# Patient Record
Sex: MALE | Race: BLACK OR AFRICAN AMERICAN | Employment: OTHER | URBAN - METROPOLITAN AREA
[De-identification: names, ages, dates, MRNs, and addresses within clinical notes are randomized per-mention and may not be internally consistent; named-entity substitution may affect disease eponyms.]

---

## 2021-12-21 ENCOUNTER — HOSPITAL ENCOUNTER (EMERGENCY)
Age: 86
Discharge: NURSING, REHAB OR LONG-TERM CARE | End: 2021-12-22
Attending: EMERGENCY MEDICINE
Payer: MEDICARE

## 2021-12-21 ENCOUNTER — APPOINTMENT (OUTPATIENT)
Dept: CT IMAGING | Age: 86
End: 2021-12-21
Attending: EMERGENCY MEDICINE
Payer: MEDICARE

## 2021-12-21 VITALS
HEIGHT: 69 IN | TEMPERATURE: 97.8 F | SYSTOLIC BLOOD PRESSURE: 107 MMHG | RESPIRATION RATE: 12 BRPM | BODY MASS INDEX: 29.62 KG/M2 | OXYGEN SATURATION: 96 % | DIASTOLIC BLOOD PRESSURE: 72 MMHG | WEIGHT: 200 LBS | HEART RATE: 67 BPM

## 2021-12-21 DIAGNOSIS — R53.1 WEAKNESS: Primary | ICD-10-CM

## 2021-12-21 LAB
ALBUMIN SERPL-MCNC: 3.1 G/DL (ref 3.5–5)
ALBUMIN/GLOB SERPL: 0.6 {RATIO} (ref 1.1–2.2)
ALP SERPL-CCNC: 62 U/L (ref 45–117)
ALT SERPL-CCNC: 26 U/L (ref 12–78)
ANION GAP SERPL CALC-SCNC: 5 MMOL/L (ref 5–15)
AST SERPL-CCNC: 91 U/L (ref 15–37)
BASOPHILS # BLD: 0.1 K/UL (ref 0–0.1)
BASOPHILS NFR BLD: 1 % (ref 0–1)
BILIRUB SERPL-MCNC: 0.4 MG/DL (ref 0.2–1)
BUN SERPL-MCNC: 53 MG/DL (ref 6–20)
BUN/CREAT SERPL: 32 (ref 12–20)
CALCIUM SERPL-MCNC: 9.8 MG/DL (ref 8.5–10.1)
CHLORIDE SERPL-SCNC: 100 MMOL/L (ref 97–108)
CO2 SERPL-SCNC: 30 MMOL/L (ref 21–32)
COMMENT, HOLDF: NORMAL
COVID-19 RAPID TEST, COVR: NOT DETECTED
CREAT SERPL-MCNC: 1.68 MG/DL (ref 0.7–1.3)
DIFFERENTIAL METHOD BLD: ABNORMAL
EOSINOPHIL # BLD: 0.3 K/UL (ref 0–0.4)
EOSINOPHIL NFR BLD: 4 % (ref 0–7)
ERYTHROCYTE [DISTWIDTH] IN BLOOD BY AUTOMATED COUNT: 15.4 % (ref 11.5–14.5)
GLOBULIN SER CALC-MCNC: 4.8 G/DL (ref 2–4)
GLUCOSE SERPL-MCNC: 150 MG/DL (ref 65–100)
HCT VFR BLD AUTO: 34 % (ref 36.6–50.3)
HGB BLD-MCNC: 10.4 G/DL (ref 12.1–17)
IMM GRANULOCYTES # BLD AUTO: 0 K/UL (ref 0–0.04)
IMM GRANULOCYTES NFR BLD AUTO: 1 % (ref 0–0.5)
LYMPHOCYTES # BLD: 0.9 K/UL (ref 0.8–3.5)
LYMPHOCYTES NFR BLD: 13 % (ref 12–49)
MCH RBC QN AUTO: 28.4 PG (ref 26–34)
MCHC RBC AUTO-ENTMCNC: 30.6 G/DL (ref 30–36.5)
MCV RBC AUTO: 92.9 FL (ref 80–99)
MONOCYTES # BLD: 0.8 K/UL (ref 0–1)
MONOCYTES NFR BLD: 11 % (ref 5–13)
NEUTS SEG # BLD: 4.8 K/UL (ref 1.8–8)
NEUTS SEG NFR BLD: 70 % (ref 32–75)
NRBC # BLD: 0 K/UL (ref 0–0.01)
NRBC BLD-RTO: 0 PER 100 WBC
PLATELET # BLD AUTO: 240 K/UL (ref 150–400)
PMV BLD AUTO: 9.3 FL (ref 8.9–12.9)
POTASSIUM SERPL-SCNC: 3.8 MMOL/L (ref 3.5–5.1)
PROT SERPL-MCNC: 7.9 G/DL (ref 6.4–8.2)
RBC # BLD AUTO: 3.66 M/UL (ref 4.1–5.7)
SAMPLES BEING HELD,HOLD: NORMAL
SODIUM SERPL-SCNC: 135 MMOL/L (ref 136–145)
SOURCE, COVRS: NORMAL
WBC # BLD AUTO: 6.8 K/UL (ref 4.1–11.1)

## 2021-12-21 PROCEDURE — 36415 COLL VENOUS BLD VENIPUNCTURE: CPT

## 2021-12-21 PROCEDURE — 74011250636 HC RX REV CODE- 250/636: Performed by: EMERGENCY MEDICINE

## 2021-12-21 PROCEDURE — 93005 ELECTROCARDIOGRAM TRACING: CPT

## 2021-12-21 PROCEDURE — 80053 COMPREHEN METABOLIC PANEL: CPT

## 2021-12-21 PROCEDURE — 70450 CT HEAD/BRAIN W/O DYE: CPT

## 2021-12-21 PROCEDURE — 85025 COMPLETE CBC W/AUTO DIFF WBC: CPT

## 2021-12-21 PROCEDURE — 87635 SARS-COV-2 COVID-19 AMP PRB: CPT

## 2021-12-21 PROCEDURE — 99285 EMERGENCY DEPT VISIT HI MDM: CPT

## 2021-12-21 RX ADMIN — SODIUM CHLORIDE 500 ML: 9 INJECTION, SOLUTION INTRAVENOUS at 16:09

## 2021-12-21 NOTE — PROGRESS NOTES
4:31 PM    Date of previous inpatient admission/ ED visit? No previous inpatient admissions/ED visits from [facility]. What brought the patient to ED? Chart reviewed: Patient brought in by EMS from [facility] for CVA rule out. Patient  w/ right sided weakness for 3 days. Did patient decline recommended services during last admission/ ED visit (if yes, what)? No previous inpatient admissions/ED visits from [facility]    Has patient seen a provider since their last inpatient admission/ED visit (if yes, when)? No previous inpatient admissions/ED visits from [facility]. Last seen by physician at The [address] [place] [address]. Date of service is unknown. PCP: YES First and Last name: Dr. Prince Moody    Name of Practice: The [address] [place] [address]   Are you a current patient: Yes/No:  YES   Approximate date of last visit: Unknown    CM Interventions:  From previous inpatient admission/ED visit: No previous inpatient admissions/ED visits. From current inpatient admission/ED visit:  CM consult received for weakness and debility. Patient currently being ruled out for COVID-19. Initial assessment completed with patient's spouse, Horace Queenr [phone] and SW at [facility]. Other identified supports are patient's sons, Claude Sato and Steven Martinez. Patient is a 80year old LTC resident at [facility]. Requires assistance with ADL's and IADL's. DME utilized: walker, cane, motorized scooter. Insurance verified: Medicare A&B. Patient has applied for LTC Medicaid at facility. Medications are administered. BLS transport needed at discharge. SARAI plan is for patient to transition back to LTC residence once medically stable. CM will continue to follow and assist with SARAI needs as they arise.     AMY Pritchard/MEAGHAN  Care Management

## 2021-12-21 NOTE — ED NOTES
Pt brought in by EMS from [facility] for CVA rule out. Pt w/ right sided weakness for 3 days.

## 2021-12-21 NOTE — ED PROVIDER NOTES
Date of Service:  12/21/2021    Patient:  Rivas Jones    Chief Complaint:  Extremity Weakness (right sided weakness 3x days )       HPI:  Rivas Jones is a 80 y.o.  male who presents for evaluation of extremity weakness. Patient reportedly has 3 days worth of right-sided weakness. Per patient he states that his right leg feels weak. He is unsure of the time. He comes from some type of facility that apparently notices weakness 3 days ago. Patient denies chest pain shortness of breath abdominal pain nausea vomiting diarrhea headaches fevers or chills. States that he has not really eating very much. Otherwise he has no pain. No past medical history on file. No past surgical history on file. No family history on file. Social History     Socioeconomic History    Marital status: Not on file     Spouse name: Not on file    Number of children: Not on file    Years of education: Not on file    Highest education level: Not on file   Occupational History    Not on file   Tobacco Use    Smoking status: Not on file    Smokeless tobacco: Not on file   Substance and Sexual Activity    Alcohol use: Not on file    Drug use: Not on file    Sexual activity: Not on file   Other Topics Concern    Not on file   Social History Narrative    Not on file     Social Determinants of Health     Financial Resource Strain:     Difficulty of Paying Living Expenses: Not on file   Food Insecurity:     Worried About Running Out of Food in the Last Year: Not on file    Mery of Food in the Last Year: Not on file   Transportation Needs:     Lack of Transportation (Medical): Not on file    Lack of Transportation (Non-Medical):  Not on file   Physical Activity:     Days of Exercise per Week: Not on file    Minutes of Exercise per Session: Not on file   Stress:     Feeling of Stress : Not on file   Social Connections:     Frequency of Communication with Friends and Family: Not on file    Frequency of Social Gatherings with Friends and Family: Not on file    Attends Taoism Services: Not on file    Active Member of Clubs or Organizations: Not on file    Attends Club or Organization Meetings: Not on file    Marital Status: Not on file   Intimate Partner Violence:     Fear of Current or Ex-Partner: Not on file    Emotionally Abused: Not on file    Physically Abused: Not on file    Sexually Abused: Not on file   Housing Stability:     Unable to Pay for Housing in the Last Year: Not on file    Number of [place] in the Last Year: Not on file    Unstable Housing in the Last Year: Not on file         ALLERGIES: Patient has no allergy information on record. Review of Systems   Neurological: Positive for weakness. All other systems reviewed and are negative. Vitals:    12/21/21 1200   BP: 132/63   Pulse: 78   Resp: 18   Temp: 97.8 °F (36.6 °C)   SpO2: 96%            Physical Exam  Vitals and nursing note reviewed. Constitutional:       Appearance: Normal appearance. HENT:      Head: Normocephalic and atraumatic. Mouth/Throat:      Mouth: Mucous membranes are moist.   Eyes:      General: No scleral icterus. Extraocular Movements: Extraocular movements intact. Pupils: Pupils are equal, round, and reactive to light. Cardiovascular:      Rate and Rhythm: Normal rate. Pulmonary:      Effort: Pulmonary effort is normal.   Abdominal:      General: Abdomen is flat. Musculoskeletal:         General: No tenderness or deformity. Skin:     General: Skin is warm. Capillary Refill: Capillary refill takes less than 2 seconds. Neurological:      Mental Status: He is alert and oriented to person, place, and time. Comments: RLE 2/5, LLE 4/5  B/L UE 4/5   Psychiatric:         Mood and Affect: Mood normal.          [facility]  ED Course as of 12/21/21 1959   Tue Dec 21, 2021   1349 EKG 1341  Sinus at 79 with a left axis. Incomplete left bundle branch block pattern.   No STEMI [GG] ED Course User Index  [GG] Mell Rcok DO     VITAL SIGNS:  Patient Vitals for the past 4 hrs:   Pulse Resp BP SpO2   12/21/21 1730 67 12 107/72    12/21/21 1700 63 14 118/70 96 %   12/21/21 1630 62 12 116/72 99 %         LABS:  Recent Results (from the past 6 hour(s))   CBC WITH AUTOMATED DIFF    Collection Time: 12/21/21  3:16 PM   Result Value Ref Range    WBC 6.8 4.1 - 11.1 K/uL    RBC 3.66 (L) 4.10 - 5.70 M/uL    HGB 10.4 (L) 12.1 - 17.0 g/dL    HCT 34.0 (L) 36.6 - 50.3 %    MCV 92.9 80.0 - 99.0 FL    MCH 28.4 26.0 - 34.0 PG    MCHC 30.6 30.0 - 36.5 g/dL    RDW 15.4 (H) 11.5 - 14.5 %    PLATELET 901 886 - 351 K/uL    MPV 9.3 8.9 - 12.9 FL    NRBC 0.0 0  WBC    ABSOLUTE NRBC 0.00 0.00 - 0.01 K/uL    NEUTROPHILS 70 32 - 75 %    LYMPHOCYTES 13 12 - 49 %    MONOCYTES 11 5 - 13 %    EOSINOPHILS 4 0 - 7 %    BASOPHILS 1 0 - 1 %    IMMATURE GRANULOCYTES 1 (H) 0.0 - 0.5 %    ABS. NEUTROPHILS 4.8 1.8 - 8.0 K/UL    ABS. LYMPHOCYTES 0.9 0.8 - 3.5 K/UL    ABS. MONOCYTES 0.8 0.0 - 1.0 K/UL    ABS. EOSINOPHILS 0.3 0.0 - 0.4 K/UL    ABS. BASOPHILS 0.1 0.0 - 0.1 K/UL    ABS. IMM. GRANS. 0.0 0.00 - 0.04 K/UL    DF AUTOMATED     METABOLIC PANEL, COMPREHENSIVE    Collection Time: 12/21/21  3:16 PM   Result Value Ref Range    Sodium 135 (L) 136 - 145 mmol/L    Potassium 3.8 3.5 - 5.1 mmol/L    Chloride 100 97 - 108 mmol/L    CO2 30 21 - 32 mmol/L    Anion gap 5 5 - 15 mmol/L    Glucose 150 (H) 65 - 100 mg/dL    BUN 53 (H) 6 - 20 MG/DL    Creatinine 1.68 (H) 0.70 - 1.30 MG/DL    BUN/Creatinine ratio 32 (H) 12 - 20      GFR est AA 47 (L) >60 ml/min/1.73m2    GFR est non-AA 39 (L) >60 ml/min/1.73m2    Calcium 9.8 8.5 - 10.1 MG/DL    Bilirubin, total 0.4 0.2 - 1.0 MG/DL    ALT (SGPT) 26 12 - 78 U/L    AST (SGOT) 91 (H) 15 - 37 U/L    Alk.  phosphatase 62 45 - 117 U/L    Protein, total 7.9 6.4 - 8.2 g/dL    Albumin 3.1 (L) 3.5 - 5.0 g/dL    Globulin 4.8 (H) 2.0 - 4.0 g/dL    A-G Ratio 0.6 (L) 1.1 - 2.2     SAMPLES BEING HELD    Collection Time: 12/21/21  3:16 PM   Result Value Ref Range    SAMPLES BEING HELD 2RED, 1BLU     COMMENT        Add-on orders for these samples will be processed based on acceptable specimen integrity and analyte stability, which may vary by analyte. COVID-19 RAPID TEST    Collection Time: 12/21/21  6:22 PM   Result Value Ref Range    Specimen source Nasopharyngeal      COVID-19 rapid test Not detected NOTD          IMAGING:  CT HEAD WO CONT   Final Result   1. No evidence of acute infarct or intracranial hemorrhage. 2. Periventricular white matter disease is likely secondary to chronic small   vessel ischemic changes. Medications During Visit:  Medications   sodium chloride 0.9 % bolus infusion 500 mL (500 mL IntraVENous New Bag 12/21/21 7402)         DECISION MAKING:  Charito Stein is a 80 y.o. male who comes in as above. Here, patient appears well. He is able to get up and ambulate at what we believe is his baseline based on our conversation with the SNF. Patient has no signs of focal deficit otherwise. Patient to be discharged home. Patient has no other signs of acute distress. IMPRESSION:  1. Weakness        DISPOSITION:  Discharged      There are no discharge medications for this patient. Follow-up Information     Follow up With Specialties Details Why Contact Info    Harwood Prader, MD Internal Medicine Schedule an appointment as soon as possible for a visit   [address] [place] [address] [phone]              The patient is asked to follow-up with their primary care provider in the next several days. They are to call tomorrow for an appointment. The patient is asked to return promptly for any increased concerns or worsening of symptoms. They can return to this emergency department or any other emergency department.     Procedures

## 2021-12-22 NOTE — PROGRESS NOTES
7:02 PM  CM consulted to assist with patient transport back to [facility] [employer]. Patient in need of stretcher transport. CM attempted to establish transport with [facility] or [facility]. Both of those agencies unable to accommodate patient for transport until tomorrow. Referral placed to [facility] [phone]. [facility] indicated that that they are able to accept and accommodate patient this evening. ETA: 10:30 pm-10:45 pm.  PCS form completed and provided to ED staff for patient transport.     AMY Pritchard/CRM  Care Management

## 2021-12-22 NOTE — PROGRESS NOTES
Pt is cleared to DC from ER, currently asleep, awaiting transportation since last night. Pt does not have decline in function compared to prior to admission per MD and report from facility. Completing orders at this time, DC from caseload.      Yogesh Kamara DPT, PT

## 2021-12-22 NOTE — PROGRESS NOTES
EMR noted SSED consult 12/21. Noted delay in transportation and patient picked up today after 1100. Updates received from [facility].

## 2021-12-24 LAB
ATRIAL RATE: 67 BPM
CALCULATED P AXIS, ECG09: 86 DEGREES
CALCULATED R AXIS, ECG10: -44 DEGREES
CALCULATED T AXIS, ECG11: 124 DEGREES
DIAGNOSIS, 93000: NORMAL
P-R INTERVAL, ECG05: 170 MS
Q-T INTERVAL, ECG07: 402 MS
QRS DURATION, ECG06: 116 MS
QTC CALCULATION (BEZET), ECG08: 424 MS
VENTRICULAR RATE, ECG03: 67 BPM

## 2022-01-04 ENCOUNTER — APPOINTMENT (OUTPATIENT)
Dept: CT IMAGING | Age: 87
DRG: 069 | End: 2022-01-04
Attending: EMERGENCY MEDICINE
Payer: MEDICARE

## 2022-01-04 ENCOUNTER — HOSPITAL ENCOUNTER (INPATIENT)
Age: 87
LOS: 1 days | Discharge: NURSING, REHAB OR LONG-TERM CARE | DRG: 069 | End: 2022-01-06
Attending: EMERGENCY MEDICINE | Admitting: FAMILY MEDICINE
Payer: MEDICARE

## 2022-01-04 DIAGNOSIS — H53.8 VISUAL BLURRINESS: ICD-10-CM

## 2022-01-04 DIAGNOSIS — R41.0 CONFUSION: Primary | ICD-10-CM

## 2022-01-04 LAB
ALBUMIN SERPL-MCNC: 3.2 G/DL (ref 3.5–5)
ALBUMIN/GLOB SERPL: 0.6 {RATIO} (ref 1.1–2.2)
ALP SERPL-CCNC: 69 U/L (ref 45–117)
ALT SERPL-CCNC: 27 U/L (ref 12–78)
ANION GAP SERPL CALC-SCNC: 9 MMOL/L (ref 5–15)
APTT PPP: 24.2 SEC (ref 22.1–31)
AST SERPL-CCNC: 30 U/L (ref 15–37)
BASOPHILS # BLD: 0 K/UL (ref 0–0.1)
BASOPHILS NFR BLD: 0 % (ref 0–1)
BILIRUB SERPL-MCNC: 0.4 MG/DL (ref 0.2–1)
BUN SERPL-MCNC: 40 MG/DL (ref 6–20)
BUN/CREAT SERPL: 18 (ref 12–20)
CALCIUM SERPL-MCNC: 9.6 MG/DL (ref 8.5–10.1)
CHLORIDE SERPL-SCNC: 101 MMOL/L (ref 97–108)
CO2 SERPL-SCNC: 24 MMOL/L (ref 21–32)
COMMENT, HOLDF: NORMAL
CREAT SERPL-MCNC: 2.22 MG/DL (ref 0.7–1.3)
DIFFERENTIAL METHOD BLD: ABNORMAL
EOSINOPHIL # BLD: 0 K/UL (ref 0–0.4)
EOSINOPHIL NFR BLD: 1 % (ref 0–7)
ERYTHROCYTE [DISTWIDTH] IN BLOOD BY AUTOMATED COUNT: 15.8 % (ref 11.5–14.5)
GLOBULIN SER CALC-MCNC: 5.2 G/DL (ref 2–4)
GLUCOSE BLD STRIP.AUTO-MCNC: 152 MG/DL (ref 65–117)
GLUCOSE SERPL-MCNC: 140 MG/DL (ref 65–100)
HCT VFR BLD AUTO: 36.5 % (ref 36.6–50.3)
HGB BLD-MCNC: 11.1 G/DL (ref 12.1–17)
IMM GRANULOCYTES # BLD AUTO: 0.1 K/UL (ref 0–0.04)
IMM GRANULOCYTES NFR BLD AUTO: 1 % (ref 0–0.5)
INR PPP: 1 (ref 0.9–1.1)
LYMPHOCYTES # BLD: 0.8 K/UL (ref 0.8–3.5)
LYMPHOCYTES NFR BLD: 14 % (ref 12–49)
MCH RBC QN AUTO: 28.5 PG (ref 26–34)
MCHC RBC AUTO-ENTMCNC: 30.4 G/DL (ref 30–36.5)
MCV RBC AUTO: 93.6 FL (ref 80–99)
MONOCYTES # BLD: 0.5 K/UL (ref 0–1)
MONOCYTES NFR BLD: 9 % (ref 5–13)
NEUTS SEG # BLD: 4.7 K/UL (ref 1.8–8)
NEUTS SEG NFR BLD: 75 % (ref 32–75)
NRBC # BLD: 0 K/UL (ref 0–0.01)
NRBC BLD-RTO: 0 PER 100 WBC
PLATELET # BLD AUTO: 232 K/UL (ref 150–400)
PMV BLD AUTO: 9.7 FL (ref 8.9–12.9)
POTASSIUM SERPL-SCNC: 4.2 MMOL/L (ref 3.5–5.1)
PROT SERPL-MCNC: 8.4 G/DL (ref 6.4–8.2)
PROTHROMBIN TIME: 10.5 SEC (ref 9–11.1)
RBC # BLD AUTO: 3.9 M/UL (ref 4.1–5.7)
SAMPLES BEING HELD,HOLD: NORMAL
SERVICE CMNT-IMP: ABNORMAL
SODIUM SERPL-SCNC: 134 MMOL/L (ref 136–145)
THERAPEUTIC RANGE,PTTT: NORMAL SECS (ref 58–77)
WBC # BLD AUTO: 6.2 K/UL (ref 4.1–11.1)

## 2022-01-04 PROCEDURE — 85730 THROMBOPLASTIN TIME PARTIAL: CPT

## 2022-01-04 PROCEDURE — 70450 CT HEAD/BRAIN W/O DYE: CPT

## 2022-01-04 PROCEDURE — 99285 EMERGENCY DEPT VISIT HI MDM: CPT

## 2022-01-04 PROCEDURE — 94762 N-INVAS EAR/PLS OXIMTRY CONT: CPT

## 2022-01-04 PROCEDURE — 0042T CT CODE NEURO PERF W CBF: CPT

## 2022-01-04 PROCEDURE — 74011000636 HC RX REV CODE- 636: Performed by: RADIOLOGY

## 2022-01-04 PROCEDURE — 85610 PROTHROMBIN TIME: CPT

## 2022-01-04 PROCEDURE — 70496 CT ANGIOGRAPHY HEAD: CPT

## 2022-01-04 PROCEDURE — G0378 HOSPITAL OBSERVATION PER HR: HCPCS

## 2022-01-04 PROCEDURE — 93005 ELECTROCARDIOGRAM TRACING: CPT

## 2022-01-04 PROCEDURE — 80053 COMPREHEN METABOLIC PANEL: CPT

## 2022-01-04 PROCEDURE — 85025 COMPLETE CBC W/AUTO DIFF WBC: CPT

## 2022-01-04 PROCEDURE — 36415 COLL VENOUS BLD VENIPUNCTURE: CPT

## 2022-01-04 PROCEDURE — 82962 GLUCOSE BLOOD TEST: CPT

## 2022-01-04 RX ORDER — GUAIFENESIN 100 MG/5ML
81 LIQUID (ML) ORAL DAILY
Status: DISCONTINUED | OUTPATIENT
Start: 2022-01-05 | End: 2022-01-06 | Stop reason: HOSPADM

## 2022-01-04 RX ORDER — ACETAMINOPHEN 325 MG/1
650 TABLET ORAL
Status: DISCONTINUED | OUTPATIENT
Start: 2022-01-04 | End: 2022-01-06 | Stop reason: HOSPADM

## 2022-01-04 RX ORDER — ACETAMINOPHEN 650 MG/1
650 SUPPOSITORY RECTAL
Status: DISCONTINUED | OUTPATIENT
Start: 2022-01-04 | End: 2022-01-06 | Stop reason: HOSPADM

## 2022-01-04 NOTE — ED NOTES
Assumed care of patient in [place] from [facility]. Unable to obtain IV access or labs after multiple sticks. Escalated for US IV. MD Dalila Baez currently evaluating patient.

## 2022-01-04 NOTE — ED NOTES
Bedside and Verbal shift change report given to Rodríguez Cavanaugh (oncoming nurse) by Mariposa Noguera (offgoing nurse). Report included the following information SBAR, Kardex, ED Summary, Intake/Output, MAR and Recent Results.

## 2022-01-04 NOTE — ED TRIAGE NOTES
Triage Note: Patient arrives from PT for blurry vision to the right eye and AMS since 2:45 PM today. Hx of dementia.  per EMS.

## 2022-01-04 NOTE — ED PROVIDER NOTES
This is a 25-year-old male who was just in the hospital on 21 December for right-sided weakness. He had a CT and labs done there were no acute findings so he was discharged home. Today he was in rehab. He has some dementia. He Started complaining of some blurry vision in the right eye and also had some confusion according to staff. EMS arrived and he was still having the visual symptoms. He did not know who the president was and did have some confusion but it was unclear whether this was his baseline or not. There is no history of fever or chills and no history of vomiting. Patient denies any pain. He states that he still having some symptoms of blurry vision and feels like he is a little weak in his right arm. His last known well was approximately 230-felicitas. No past medical history on file. No past surgical history on file. No family history on file. Social History     Socioeconomic History    Marital status:      Spouse name: Not on file    Number of children: Not on file    Years of education: Not on file    Highest education level: Not on file   Occupational History    Not on file   Tobacco Use    Smoking status: Not on file    Smokeless tobacco: Not on file   Substance and Sexual Activity    Alcohol use: Not on file    Drug use: Not on file    Sexual activity: Not on file   Other Topics Concern    Not on file   Social History Narrative    Not on file     Social Determinants of Health     Financial Resource Strain:     Difficulty of Paying Living Expenses: Not on file   Food Insecurity:     Worried About Running Out of Food in the Last Year: Not on file    Mery of Food in the Last Year: Not on file   Transportation Needs:     Lack of Transportation (Medical): Not on file    Lack of Transportation (Non-Medical):  Not on file   Physical Activity:     Days of Exercise per Week: Not on file    Minutes of Exercise per Session: Not on file   Stress:     Feeling of Stress : Not on file   Social Connections:     Frequency of Communication with Friends and Family: Not on file    Frequency of Social Gatherings with Friends and Family: Not on file    Attends Yazidi Services: Not on file    Active Member of Clubs or Organizations: Not on file    Attends Club or Organization Meetings: Not on file    Marital Status: Not on file   Intimate Partner Violence:     Fear of Current or Ex-Partner: Not on file    Emotionally Abused: Not on file    Physically Abused: Not on file    Sexually Abused: Not on file   Housing Stability:     Unable to Pay for Housing in the Last Year: Not on file    Number of [place] in the Last Year: Not on file    Unstable Housing in the Last Year: Not on file         ALLERGIES: Patient has no known allergies. Review of Systems   Constitutional: Positive for fever. Negative for activity change, appetite change, chills and fatigue. HENT: Negative for ear pain, facial swelling, sore throat and trouble swallowing. Eyes: Positive for visual disturbance ( Patient's complaining of blurred vision in the right eye). Negative for pain and discharge. Respiratory: Negative for chest tightness, shortness of breath and wheezing. Cardiovascular: Negative for chest pain and palpitations. Gastrointestinal: Negative for abdominal pain, blood in stool, nausea and vomiting. Genitourinary: Negative for difficulty urinating, flank pain and hematuria. Musculoskeletal: Negative for arthralgias, joint swelling, myalgias and neck pain. Skin: Negative for color change and rash. Neurological: Negative for dizziness, weakness, numbness and headaches. Hematological: Negative for adenopathy. Does not bruise/bleed easily. Psychiatric/Behavioral: Negative for behavioral problems, confusion and sleep disturbance. All other systems reviewed and are negative. There were no vitals filed for this visit.          Physical Exam  Vitals and nursing note reviewed. Constitutional:       General: He is not in acute distress. Appearance: He is well-developed. He is not ill-appearing or diaphoretic. Comments: This is a 59-year-old male who is alert. He is a bit confused in terms of the date the president where he is. He does not remember exactly what happened to him today. His only complaint is blurred vision. Vital signs are as noted. HENT:      Head: Normocephalic and atraumatic. Nose: Nose normal.   Eyes:      General: Visual field deficit ( Patient is just complaining of blurred vision without any visual field cut.) present. No scleral icterus. Conjunctiva/sclera: Conjunctivae normal.      Pupils: Pupils are equal, round, and reactive to light. Neck:      Thyroid: No thyromegaly. Vascular: No JVD. Trachea: No tracheal deviation. Comments: No carotid bruits noted. Cardiovascular:      Rate and Rhythm: Normal rate and regular rhythm. Heart sounds: Normal heart sounds. No murmur heard. No friction rub. No gallop. Pulmonary:      Effort: Pulmonary effort is normal. No respiratory distress. Breath sounds: Normal breath sounds. No wheezing or rales. Chest:      Chest wall: No tenderness. Abdominal:      General: Bowel sounds are normal. There is no distension. Palpations: Abdomen is soft. There is no mass. Tenderness: There is no abdominal tenderness. There is no guarding or rebound. Musculoskeletal:         General: No tenderness. Normal range of motion. Cervical back: Normal range of motion and neck supple. Lymphadenopathy:      Cervical: No cervical adenopathy. Skin:     General: Skin is warm and dry. Findings: No erythema or rash. Neurological:      Mental Status: He is alert and oriented to person, place, and time. Mental status is at baseline.       Cranial Nerves: No cranial nerve deficit, dysarthria or facial asymmetry ( There is slight squinting of the right eye and slight tearing noted in the eye.). Sensory: No sensory deficit. Motor: No weakness. Coordination: Coordination normal.      Deep Tendon Reflexes: Reflexes are normal and symmetric. Psychiatric:         Behavior: Behavior normal.         Thought Content: Thought content normal.         Judgment: Judgment normal.          MDM  Number of Diagnoses or Management Options     Amount and/or Complexity of Data Reviewed  Clinical lab tests: ordered and reviewed  Tests in the radiology section of CPT®: ordered and reviewed  Decide to obtain previous medical records or to obtain history from someone other than the patient: yes  Review and summarize past medical records: yes  Discuss the patient with other providers: yes    Risk of Complications, Morbidity, and/or Mortality  Presenting problems: high  Diagnostic procedures: high  Management options: high    Patient Progress  Patient progress: stable         Procedures      On the initial exam, the patient seemed to have some inability to hold his right arm even with his left. He appeared to have some type of discomfort or restriction in the shoulder and would not raise his arm even with the left. On further exam after his CT with passive range of motion he is able to get the shoulder fully extended. He was able to hold both arms up appropriately following the initial scan. He still complaining of slight blurry vision. Again it is noted that he has a little squinting in the right eye and he states that the eye was beginning to get a little watery. A code stroke was called on this patient because he was seen several weeks ago for right-sided weakness that resolved. He then today around 230 which was just prior to coming in developed some blurred vision according to physical therapy and confusion. The patient is demented and it was unclear how much of his confusion was truly new and how much of it was just baseline.   I have spoken with Dr. Ricardo Silva is on for telemetry neuro. He would like all 3 scans done. Will evaluate after the scans. 4:51 PM I have spoken with Dr. Mor Esparza again. His recommendation is to have the patient admitted for MRI and further evaluation. Is not clear whether or not he had a stroke a couple weeks ago when he had his right-sided weakness nor is it clear whether he has had some type of strokelike symptoms syndrome Today. The last known well is not clearly known either. He would like to maintain permissive hypertension and aspirin. We will have the hospitalist evaluate and admit for further evaluation and treatment. Perfect Serve Consult for Admission  4:56 PM    ED Room Number: O/HO  Patient Name and age:  Keron Dick 80 y.o.  male  Working Diagnosis:   1. Confusion    2.  Visual blurriness        COVID-19 Suspicion:  no  Sepsis present:  no  Reassessment needed: no  Code Status:  Full Code  Readmission: no  Isolation Requirements:  no  Recommended Level of Care:  telemetry  Department:[facility] Adult ED - 21   Other:

## 2022-01-04 NOTE — SENIOR SERVICES NOTE
SSED assistance. Collaboration with Dr. Yuriy Buckley, lack of documentation noted from receiving facility, assisting in matter. Chart Reviewed: Patient currently at [facility], [phone] for rehab services following 12/21/21 ED visit. Facility to fax documentation to ED  [phone]. Dr Yuriy Buckley aware.     Karlee Toscano NP  SSED  4:08 PM

## 2022-01-04 NOTE — PROGRESS NOTES
Pager response to Code Stroke in ER HO. Unable to assess due to medical intervention. No family present. Provided ministry of presence and collaborated with staff. Advised of  availability. Please contact [facility] for any further referrals. Visited by: Staci Desir. Patricia Osorio, [address] [place] [address] Road paging Service [phone]  .

## 2022-01-04 NOTE — ADVANCED PRACTICE NURSE
Neurocritical Care Code Stroke Documentation      Symptoms:   vague c/o blurry vision right. When I saw him at 65, he was c/o stomache ache as he is hungry   Last Known Well:  21 1430   Medical hx: Past Medical History:   Diagnosis Date    Dementia ([facility] [address]       Anticoagulation:  none on record   VAN:  /Negative   NIHSS:   1a-LOC: 0    1b-Month/Age:0    1c-Open/Close Hand: 0    2-Best Gaze: 0    3-Visual Fields: 0    4-Facial Palsy: 0    5a-Left Arm: 0    5b-Right Arm: 0    6a-Left Le    6b-Right Le    7-Limb Ataxia: 0    8-Sensory: 0    9-Best Language: 0    10-Dysarthria: 0    11-Extinction/Inattention: 0  TOTAL SCORE: 0   Imaging:   CT: No acute findings    CTA: No LVO    CTP: No perfusion mismatch   Plan:   TPA Candidate: NO    Mechanical thrombectomy Candidate: NO     Discussed with: Dr. Herrera Arce, Dr. Monson [place] and patient    **Briefly saw pt when he was in the CT scanner as 2 Code Strokes called together. Arrival time: Was in CT when Code Stroke called and saw within few minutes of arrival.  Time spent: 25 minutes.      Vic Mendoza NP  Neurocritical Care Nurse Practitioner  [phone]

## 2022-01-05 ENCOUNTER — APPOINTMENT (OUTPATIENT)
Dept: NON INVASIVE DIAGNOSTICS | Age: 87
DRG: 069 | End: 2022-01-05
Attending: FAMILY MEDICINE
Payer: MEDICARE

## 2022-01-05 ENCOUNTER — APPOINTMENT (OUTPATIENT)
Dept: MRI IMAGING | Age: 87
DRG: 069 | End: 2022-01-05
Attending: FAMILY MEDICINE
Payer: MEDICARE

## 2022-01-05 LAB
ANION GAP SERPL CALC-SCNC: 7 MMOL/L (ref 5–15)
ATRIAL RATE: 68 BPM
BASOPHILS # BLD: 0 K/UL (ref 0–0.1)
BASOPHILS NFR BLD: 1 % (ref 0–1)
BUN SERPL-MCNC: 29 MG/DL (ref 6–20)
BUN/CREAT SERPL: 20 (ref 12–20)
CALCIUM SERPL-MCNC: 9 MG/DL (ref 8.5–10.1)
CALCULATED P AXIS, ECG09: 79 DEGREES
CALCULATED R AXIS, ECG10: -27 DEGREES
CALCULATED T AXIS, ECG11: 85 DEGREES
CHLORIDE SERPL-SCNC: 101 MMOL/L (ref 97–108)
CHOLEST SERPL-MCNC: 129 MG/DL
CO2 SERPL-SCNC: 28 MMOL/L (ref 21–32)
COVID-19 RAPID TEST, COVR: DETECTED
CREAT SERPL-MCNC: 1.46 MG/DL (ref 0.7–1.3)
DIAGNOSIS, 93000: NORMAL
DIFFERENTIAL METHOD BLD: ABNORMAL
ECHO AO ROOT DIAM: 3.6 CM
ECHO AO ROOT INDEX: 1.73 CM/M2
ECHO AV AREA PEAK VELOCITY: 1.4 CM2
ECHO AV AREA PEAK VELOCITY: 1.7 CM2
ECHO AV AREA PLAN/BSA: 0.82 CM2/M2
ECHO AV AREA PLAN: 1.7 CM2
ECHO AV AREA VTI: 1.6 CM2
ECHO AV AREA VTI: 2 CM2
ECHO AV MEAN GRADIENT: 10 MMHG
ECHO AV MEAN VELOCITY: 1.5 M/S
ECHO AV PEAK GRADIENT: 20 MMHG
ECHO AV PEAK VELOCITY: 2.2 M/S
ECHO AV VELOCITY RATIO: 0.45
ECHO AV VTI: 54.5 CM
ECHO EST RA PRESSURE: 3 MMHG
ECHO LA DIAMETER INDEX: 2.02 CM/M2
ECHO LA DIAMETER: 4.2 CM
ECHO LA TO AORTIC ROOT RATIO: 1.17
ECHO LV E' LATERAL VELOCITY: 10 CM/S
ECHO LV E' SEPTAL VELOCITY: 5 CM/S
ECHO LV EDV A2C: 70 ML
ECHO LV EDV A4C: 73 ML
ECHO LV EDV BP: 71 ML (ref 67–155)
ECHO LV EDV BP: 71 ML (ref 67–155)
ECHO LV EDV INDEX A4C: 35 ML/M2
ECHO LV EDV NDEX A2C: 34 ML/M2
ECHO LV EJECTION FRACTION A2C: 55 %
ECHO LV EJECTION FRACTION A4C: 53 %
ECHO LV EJECTION FRACTION BIPLANE: 54 % (ref 55–100)
ECHO LV EJECTION FRACTION BIPLANE: 54 % (ref 55–100)
ECHO LV ESV A2C: 32 ML
ECHO LV ESV A4C: 34 ML
ECHO LV ESV BP: 33 ML (ref 22–58)
ECHO LV ESV INDEX A2C: 15 ML/M2
ECHO LV ESV INDEX A4C: 16 ML/M2
ECHO LV ESV INDEX BP: 16 ML/M2
ECHO LV FRACTIONAL SHORTENING: 27 % (ref 28–44)
ECHO LV INTERNAL DIMENSION DIASTOLE INDEX: 2.16 CM/M2
ECHO LV INTERNAL DIMENSION DIASTOLIC: 4.5 CM (ref 4.2–5.9)
ECHO LV INTERNAL DIMENSION SYSTOLIC INDEX: 1.59 CM/M2
ECHO LV INTERNAL DIMENSION SYSTOLIC: 3.3 CM
ECHO LV IVSD: 1.5 CM (ref 0.6–1)
ECHO LV MASS 2D: 222.6 G (ref 88–224)
ECHO LV MASS INDEX 2D: 107 G/M2 (ref 49–115)
ECHO LV POSTERIOR WALL DIASTOLIC: 1.1 CM (ref 0.6–1)
ECHO LV RELATIVE WALL THICKNESS RATIO: 0.49
ECHO LVOT AREA: 3.1 CM2
ECHO LVOT AV VTI INDEX: 0.52
ECHO LVOT DIAM: 2 CM
ECHO LVOT MEAN GRADIENT: 2 MMHG
ECHO LVOT PEAK GRADIENT: 4 MMHG
ECHO LVOT PEAK VELOCITY: 1 M/S
ECHO LVOT STROKE VOLUME INDEX: 43 ML/M2
ECHO LVOT SV: 89.5 ML
ECHO LVOT VTI: 28.5 CM
ECHO MV A VELOCITY: 1.12 M/S
ECHO MV AREA PHT: 6 CM2
ECHO MV E DECELERATION TIME (DT): 126.3 MS
ECHO MV E VELOCITY: 1.03 M/S
ECHO MV E/A RATIO: 0.92
ECHO MV E/E' LATERAL: 10.3
ECHO MV E/E' RATIO (AVERAGED): 15.45
ECHO MV E/E' SEPTAL: 20.6
ECHO MV PRESSURE HALF TIME (PHT): 36.6 MS
ECHO PV MAX VELOCITY: 0.9 M/S
ECHO PV PEAK GRADIENT: 4 MMHG
ECHO RIGHT VENTRICULAR SYSTOLIC PRESSURE (RVSP): 41 MMHG
ECHO RV TAPSE: 2 CM (ref 1.5–2)
ECHO TV REGURGITANT MAX VELOCITY: 3.09 M/S
ECHO TV REGURGITANT PEAK GRADIENT: 38 MMHG
EOSINOPHIL # BLD: 0.2 K/UL (ref 0–0.4)
EOSINOPHIL NFR BLD: 3 % (ref 0–7)
ERYTHROCYTE [DISTWIDTH] IN BLOOD BY AUTOMATED COUNT: 15.5 % (ref 11.5–14.5)
EST. AVERAGE GLUCOSE BLD GHB EST-MCNC: 134 MG/DL
GLUCOSE BLD STRIP.AUTO-MCNC: 111 MG/DL (ref 65–117)
GLUCOSE BLD STRIP.AUTO-MCNC: 128 MG/DL (ref 65–117)
GLUCOSE BLD STRIP.AUTO-MCNC: 90 MG/DL (ref 65–117)
GLUCOSE SERPL-MCNC: 133 MG/DL (ref 65–100)
HBA1C MFR BLD: 6.3 % (ref 4–5.6)
HCT VFR BLD AUTO: 35.8 % (ref 36.6–50.3)
HDLC SERPL-MCNC: 35 MG/DL
HDLC SERPL: 3.7 {RATIO} (ref 0–5)
HGB BLD-MCNC: 11 G/DL (ref 12.1–17)
IMM GRANULOCYTES # BLD AUTO: 0 K/UL (ref 0–0.04)
IMM GRANULOCYTES NFR BLD AUTO: 0 % (ref 0–0.5)
LDLC SERPL CALC-MCNC: 67.8 MG/DL (ref 0–100)
LYMPHOCYTES # BLD: 1.3 K/UL (ref 0.8–3.5)
LYMPHOCYTES NFR BLD: 25 % (ref 12–49)
MCH RBC QN AUTO: 28.6 PG (ref 26–34)
MCHC RBC AUTO-ENTMCNC: 30.7 G/DL (ref 30–36.5)
MCV RBC AUTO: 93 FL (ref 80–99)
MONOCYTES # BLD: 0.7 K/UL (ref 0–1)
MONOCYTES NFR BLD: 12 % (ref 5–13)
NEUTS SEG # BLD: 3.2 K/UL (ref 1.8–8)
NEUTS SEG NFR BLD: 59 % (ref 32–75)
NRBC # BLD: 0 K/UL (ref 0–0.01)
NRBC BLD-RTO: 0 PER 100 WBC
P-R INTERVAL, ECG05: 164 MS
PLATELET # BLD AUTO: 245 K/UL (ref 150–400)
PMV BLD AUTO: 9.6 FL (ref 8.9–12.9)
POTASSIUM SERPL-SCNC: 4.2 MMOL/L (ref 3.5–5.1)
Q-T INTERVAL, ECG07: 394 MS
QRS DURATION, ECG06: 116 MS
QTC CALCULATION (BEZET), ECG08: 418 MS
RBC # BLD AUTO: 3.85 M/UL (ref 4.1–5.7)
SERVICE CMNT-IMP: ABNORMAL
SERVICE CMNT-IMP: NORMAL
SERVICE CMNT-IMP: NORMAL
SODIUM SERPL-SCNC: 136 MMOL/L (ref 136–145)
SOURCE, COVRS: ABNORMAL
TRIGL SERPL-MCNC: 131 MG/DL (ref ?–150)
VENTRICULAR RATE, ECG03: 68 BPM
VLDLC SERPL CALC-MCNC: 26.2 MG/DL
WBC # BLD AUTO: 5.4 K/UL (ref 4.1–11.1)

## 2022-01-05 PROCEDURE — 97116 GAIT TRAINING THERAPY: CPT

## 2022-01-05 PROCEDURE — 97530 THERAPEUTIC ACTIVITIES: CPT

## 2022-01-05 PROCEDURE — 96372 THER/PROPH/DIAG INJ SC/IM: CPT

## 2022-01-05 PROCEDURE — 36415 COLL VENOUS BLD VENIPUNCTURE: CPT

## 2022-01-05 PROCEDURE — 92610 EVALUATE SWALLOWING FUNCTION: CPT | Performed by: SPEECH-LANGUAGE PATHOLOGIST

## 2022-01-05 PROCEDURE — 74011250636 HC RX REV CODE- 250/636: Performed by: STUDENT IN AN ORGANIZED HEALTH CARE EDUCATION/TRAINING PROGRAM

## 2022-01-05 PROCEDURE — 99222 1ST HOSP IP/OBS MODERATE 55: CPT | Performed by: PSYCHIATRY & NEUROLOGY

## 2022-01-05 PROCEDURE — 74011250637 HC RX REV CODE- 250/637: Performed by: STUDENT IN AN ORGANIZED HEALTH CARE EDUCATION/TRAINING PROGRAM

## 2022-01-05 PROCEDURE — 80061 LIPID PANEL: CPT

## 2022-01-05 PROCEDURE — 97161 PT EVAL LOW COMPLEX 20 MIN: CPT

## 2022-01-05 PROCEDURE — 93306 TTE W/DOPPLER COMPLETE: CPT | Performed by: SPECIALIST

## 2022-01-05 PROCEDURE — 87635 SARS-COV-2 COVID-19 AMP PRB: CPT

## 2022-01-05 PROCEDURE — 93306 TTE W/DOPPLER COMPLETE: CPT

## 2022-01-05 PROCEDURE — 74011250637 HC RX REV CODE- 250/637: Performed by: FAMILY MEDICINE

## 2022-01-05 PROCEDURE — 4A03X5D MEASUREMENT OF ARTERIAL FLOW, INTRACRANIAL, EXTERNAL APPROACH: ICD-10-PCS | Performed by: STUDENT IN AN ORGANIZED HEALTH CARE EDUCATION/TRAINING PROGRAM

## 2022-01-05 PROCEDURE — G0378 HOSPITAL OBSERVATION PER HR: HCPCS

## 2022-01-05 PROCEDURE — 97165 OT EVAL LOW COMPLEX 30 MIN: CPT

## 2022-01-05 PROCEDURE — 80048 BASIC METABOLIC PNL TOTAL CA: CPT

## 2022-01-05 PROCEDURE — 82962 GLUCOSE BLOOD TEST: CPT

## 2022-01-05 PROCEDURE — 74011250636 HC RX REV CODE- 250/636: Performed by: FAMILY MEDICINE

## 2022-01-05 PROCEDURE — 70551 MRI BRAIN STEM W/O DYE: CPT

## 2022-01-05 PROCEDURE — 97535 SELF CARE MNGMENT TRAINING: CPT

## 2022-01-05 PROCEDURE — 85025 COMPLETE CBC W/AUTO DIFF WBC: CPT

## 2022-01-05 PROCEDURE — 83036 HEMOGLOBIN GLYCOSYLATED A1C: CPT

## 2022-01-05 RX ORDER — GUAIFENESIN 100 MG/5ML
81 LIQUID (ML) ORAL DAILY
Qty: 30 TABLET | Refills: 0 | Status: SHIPPED | OUTPATIENT
Start: 2022-01-06 | End: 2022-02-05

## 2022-01-05 RX ORDER — ATORVASTATIN CALCIUM 40 MG/1
40 TABLET, FILM COATED ORAL DAILY
Qty: 30 TABLET | Refills: 0 | Status: SHIPPED | OUTPATIENT
Start: 2022-01-06 | End: 2022-02-05

## 2022-01-05 RX ORDER — HEPARIN SODIUM 5000 [USP'U]/ML
5000 INJECTION, SOLUTION INTRAVENOUS; SUBCUTANEOUS EVERY 8 HOURS
Status: DISCONTINUED | OUTPATIENT
Start: 2022-01-05 | End: 2022-01-06 | Stop reason: HOSPADM

## 2022-01-05 RX ORDER — SODIUM CHLORIDE 9 MG/ML
75 INJECTION, SOLUTION INTRAVENOUS CONTINUOUS
Status: DISPENSED | OUTPATIENT
Start: 2022-01-05 | End: 2022-01-06

## 2022-01-05 RX ORDER — ATORVASTATIN CALCIUM 40 MG/1
40 TABLET, FILM COATED ORAL DAILY
Status: DISCONTINUED | OUTPATIENT
Start: 2022-01-05 | End: 2022-01-06 | Stop reason: HOSPADM

## 2022-01-05 RX ADMIN — HEPARIN SODIUM 5000 UNITS: 5000 INJECTION INTRAVENOUS; SUBCUTANEOUS at 08:36

## 2022-01-05 RX ADMIN — HEPARIN SODIUM 5000 UNITS: 5000 INJECTION INTRAVENOUS; SUBCUTANEOUS at 14:03

## 2022-01-05 RX ADMIN — ATORVASTATIN CALCIUM 40 MG: 40 TABLET, FILM COATED ORAL at 08:36

## 2022-01-05 RX ADMIN — SODIUM CHLORIDE 75 ML/HR: 900 INJECTION, SOLUTION INTRAVENOUS at 05:00

## 2022-01-05 RX ADMIN — ASPIRIN 81 MG CHEWABLE TABLET 81 MG: 81 TABLET CHEWABLE at 08:36

## 2022-01-05 NOTE — ED NOTES
[facility] MRI checklist information, as patient is a poor historian.  No answer and no voicemail

## 2022-01-05 NOTE — PROGRESS NOTES
Transition of Care Plan   RUR- Observation    DISPOSITION: LTC - [facility] [employer]   F/U with PCP/Specialist     [facility]: [facility] scheduled 9[facility] (this will likely be pushed according to [facility])    Reason for Admission:  Confusion, blurred vision                     RUR Score:          Observation            Plan for utilizing home health: Patient resides at SNF/LTC         PCP: First and Last name:  Gladys Feng MD     Name of Practice:    Are you a current patient: Yes/No: Yes   Approximate date of last visit:    Can you participate in a virtual visit with your PCP:                     Current Advanced Directive/Advance Care Plan: Full Code      Healthcare Decision Maker:   Click here to complete [address] including selection of the [address] Relationship (ie \"Primary\")             Primary Decision Maker: [phone] - [phone]                  Transition of Care Plan:                      CM completed initial assessment with patient's spouse via phone due to patient condition. Living situation: Lives at [facility]  ADLs: needs assistance  DME: cane, RW and motorized scooter  Previous IPR/SNF:  [facility]  Previous home health: None  Demographics: confirmed, Medicare A&B. SW at [address] [place] [address] has assisted family in applying for Medicaid LTC  Pharmacy: Within [facility]  Main point of contact: Drew Valadez [phone]    LEONILA to follow patient progress and assist as recommended with SARAI plan. Care Management Interventions  PCP Verified by CM:  Yes  Palliative Care Criteria Met (RRAT>21 & CHF Dx)?: No  Mode of Transport at Discharge: BLS  Transition of Care Consult (CM Consult): Discharge Planning  MyChart Signup: No  Discharge Durable Medical Equipment: No  Health Maintenance Reviewed: Yes  Physical Therapy Consult: Yes  Occupational Therapy Consult: Yes  Speech Therapy Consult: Yes  Support Systems: Spouse/Significant Other,Child(charlie),Long Term Care [address]  Confirm Follow Up Transport: Other (see comment) (BLS)  Discharge Location  Discharge Placement: Skilled nursing facility    Oral and Written notification given to patient and/or caregiver informing patient of current Observation status receiving care in our facility. Copied placed on bedside chart. Medicare Outpatient Observation Notice (MOON) provided to patient/representative with verbal explanation of the notice. Time allotted for questions regarding the notice. Patient /representative provided a completed copy of the MOON notice. Copy placed on bedside chart.    3:07pm: CM noted DC order. [facility] requested for earliest available  time. Message left for Vj Curran [phone] at [address] [place] [address] regarding return of patient.     3:24pm: [facility] earliest ETA is after midnight. CM called [facility], no answer. CM called [facility], earliest ETA is also tomorrow. CM requested [facility] to be scheduled tomorrow at 9am.     Another message left for Admissions at [address] [place] [address] to confirm return of patient. Packet on chart. Will need to confirm call report# prior to DC.    3:44pm: CM confirmed with Messi Glorieta in Admissions at [address] [place] [address] that they are able to accept patient back tomorrow morning. Call report is #[phone] nurses station. Packet on chart. Adolfo Meter) CORRY Clark.

## 2022-01-05 NOTE — PROGRESS NOTES
Problem: Patient Education: Go to Patient Education Activity  Goal: Patient/Family Education  Outcome: Progressing Towards Goal     Problem: TIA/CVA Stroke: 0-24 hours  Goal: Off Pathway (Use only if patient is Off Pathway)  Outcome: Progressing Towards Goal  Goal: Activity/Safety  Outcome: Progressing Towards Goal  Goal: Consults, if ordered  Outcome: Progressing Towards Goal  Goal: Diagnostic Test/Procedures  Outcome: Progressing Towards Goal  Goal: Nutrition/Diet  Outcome: Progressing Towards Goal  Goal: Discharge Planning  Outcome: Progressing Towards Goal  Goal: Medications  Outcome: Progressing Towards Goal  Goal: Respiratory  Outcome: Progressing Towards Goal  Goal: Treatments/Interventions/Procedures  Outcome: Progressing Towards Goal  Goal: Minimize risk of bleeding post-thrombolytic infusion  Outcome: Progressing Towards Goal  Goal: Monitor for complications post-thrombolytic infusion  Outcome: Progressing Towards Goal  Goal: Psychosocial  Outcome: Progressing Towards Goal  Goal: *Hemodynamically stable  Outcome: Progressing Towards Goal  Goal: *Neurologically stable  Description: Absence of additional neurological deficits    Outcome: Progressing Towards Goal  Goal: *Verbalizes anxiety and depression are reduced or absent  Outcome: Progressing Towards Goal  Goal: *Absence of Signs of Aspiration on Current Diet  Outcome: Progressing Towards Goal  Goal: *Absence of deep venous thrombosis signs and symptoms(Stroke Metric)  Outcome: Progressing Towards Goal  Goal: *Ability to perform ADLs and demonstrates progressive mobility and function  Outcome: Progressing Towards Goal  Goal: *Stroke education started(Stroke Metric)  Outcome: Progressing Towards Goal  Goal: *Dysphagia screen performed(Stroke Metric)  Outcome: Progressing Towards Goal  Goal: *Rehab consulted(Stroke Metric)  Outcome: Progressing Towards Goal

## 2022-01-05 NOTE — PROGRESS NOTES
Occupational Therapy  01/05/22    Orders received, chart reviewed and patient evaluated by occupational therapy. Pending progression with skilled acute occupational therapy, recommend:  Return to LTC with assist; if unable SNF    Recommend with nursing ADLs with assist, OOB to chair 3x/day and toileting via bedside commode 1-2 assist and walker. Thank you for completing as able in order to maintain patient strength, endurance and independence. Full evaluation to follow.      Thank you,   BRYSON Crenshaw, OTR/L

## 2022-01-05 NOTE — H&P
[facility] Adult  Hospitalist Group  History and Physical    Primary Care Provider: Apolinar Joshua MD  Date of Service:  1/5/2022    Subjective:     Keron Dick is a 80 y.o. male with a pmhx dementia who presents from [facility] with reportedconfusion, and blurred vision. Pt states to me the his vision impairment is chronic. Per chart review, pt. Acute developed r eye vision loss and altered mental status. In the Ed, code neuro was called. CT head, and CTA head/neck were obtained,and negative for acute cva. Teleneurology was consulted, and recommended admission for stroke w/u. He presented on 12/21 with similar sx, and was discharged from ED. In the ED, VSS. Labs were significant for worsening creatinine to 2.2. Review of Systems:    Review of systems not obtained due to patient factors. , pt. With b/l dementia    Past Medical History:   Diagnosis Date    Dementia ([address]       No past surgical history on file. Prior to Admission medications    Not on File     No Known Allergies   No family history on file. SOCIAL HISTORY:  Patient resides at     Objective:       Physical Exam:   Visit Vitals  /75 (BP 1 Location: Right upper arm, BP Patient Position: At rest)   Pulse 66   Temp 98.6 °F (37 °C)   Resp 13   Wt 92.3 kg (203 lb 7.8 oz)   SpO2 100%   BMI 30.05 kg/m²     General:  Alert, and oriented to person, place, time,and event. Does have some issues with short term memory cooperative, no distress, appears stated age. Head:  Normocephalic, without obvious abnormality, atraumatic. Eyes:  Conjunctivae/corneas clear. PERRL, EOMs intact. Fundi benign. Ears:  Normal TMs and external ear canals both ears. Nose: Nares normal. Septum midline. Mucosa normal. No drainage or sinus tenderness.    Throat: Lips, mucosa, and tongue normal. Teeth and gums normal.   Neck: Supple, symmetrical, trachea midline, no adenopathy, thyroid: no enlargement/tenderness/nodules, no carotid bruit and no JVD. Back:   Symmetric, no curvature. ROM normal. No CVA tenderness. Lungs:   Clear to auscultation bilaterally. Chest wall:  No tenderness or deformity. Abdomen:   Soft, non-tender. Bowel sounds normal. No masses,  No organomegaly. Extremities: Extremities normal, atraumatic, no cyanosis or edema. Pulses: 2+ and symmetric all extremities. Skin: Skin color, texture, turgor normal. No rashes or lesions. Lymph nodes: Cervical, supraclavicular, and axillary nodes normal.   Neurologic: CNII-XII intact. Normal strength, sensation and reflexes throughout. Data Review: All diagnostic labs and studies have been reviewed. Assessment:     Active Problems:    Blurred vision (1/4/2022)      Confusion (1/4/2022)        Plan:     #confusion  #blurred visoin  -sx now resolved  -imaging negative  -admit to tele neuro for cva w/u including MRI, and echo  -ASA and lipitor  -neurology consulted  -permissive htm  -speech/pt/ot  -dysphagia screen  -check Hgb, and lipid panel    #tessie on ckd  -creat 2.22  -prerenal 2/2 ? PO inake  -gentle IVF  -monitor    Fen: cardiac  dvt ppx: [address]  Code: Full per report, needs confirmation        Signed By: Sandy Ruiz MD     January 5, 2022

## 2022-01-05 NOTE — ROUTINE PROCESS
SPEECH PATHOLOGY BEDSIDE SWALLOW EVALUATION/DISCHARGE  Patient: Jorge Perales (33 y.o. male)  Date: 1/5/2022  Primary Diagnosis: Blurred vision [H53.8]  Confusion [R41.0]       Precautions:        ASSESSMENT :  Based on the objective data described below, the patient presents with overall functional swallow but needed very extended time with oral phase. Edentulous status is likely a contributor to this, though there is some oral weakness. Discussed baseline diet with patient, based on tat will recommend softer texture than regular easy to chew but he can resume his baseline diet (if regular ) at next level of care. Uncertain if baseline diet is softer or if patient just chooses soft foods. He denies having had SLP tx at SNF but not a reliable historian. Skilled acute therapy provided by a speech-language pathologist is not indicated at this time. PLAN :  Recommendations:  Soft and bite sized diet  Thin liquids    Discharge Recommendations: Francis fisher on PT/OT recommendations     SUBJECTIVE:   Patient stated \"MY problem is my coffee has gotten cold. OBJECTIVE:     Past Medical History:   Diagnosis Date    Dementia ([facility] [address]    No past surgical history on file. Prior Level of Function/Home Situation: was at SNF for rehab     Diet prior to admission: unknown  Current Diet:  regular   Cognitive and Communication Status:  Neurologic State: Alert  Orientation Level: Disoriented to time,Oriented to person,Oriented to place,Oriented to situation  Cognition: Appropriate safety awareness (sits on edge of bed for eting, says he aslways does this)  Perception: Appears intact  Perseveration: No perseveration noted  Safety/Judgement: Awareness of environment  Oral Assessment:  Oral Assessment  Labial: Right droop  Dentition: Edentulous  Oral Hygiene: clean gums, slight tongue coating  Lingual: Macroglossia (tip deviation only)  Mandible:  (exaggerated movements due to edentulous status)  P. O. Trials:  Patient Position: up at edge of bed  Vocal quality prior to P.O.: No impairment; Low volume  Consistency Presented: Puree; Solid; Thin liquid;Ground;Mechanical soft (breakfast tray)           Bolus Formation/Control: Impaired  Type of Impairment: Delayed  Propulsion: Delayed (# of seconds); Discoordination; Tongue pumping  Oral Residue:  (takes very extended time to clear, but able to show SLP clear mouth aftre sips of liquid after SLP asked to stop PO for OME)  Initiation of Swallow: No impairment  Laryngeal Elevation: Functional  Aspiration Signs/Symptoms: None  Pharyngeal Phase Characteristics: No impairment, issues, or problems   Effective Modifications:  (extra time)                NOMS:   The NOMS functional outcome measure was used to quantify this patient's level of swallowing impairment. Based on the NOMS, the patient was determined to be at level 5 for swallow function     NOMS Swallowing Levels:  Level 1 (CN): NPO  Level 2 (CM): NPO but takes consistency in therapy  Level 3 (CL): Takes less than 50% of nutrition p.o. and continues with nonoral feedings; and/or safe with mod cues; and/or max diet restriction  Level 4 (CK): Safe swallow but needs mod cues; and/or mod diet restriction; and/or still requires some nonoral feeding/supplements  Level 5 (CJ): Safe swallow with min diet restriction; and/or needs min cues  Level 6 (CI): Independent with p.o.; rare cues; usually self cues; may need to avoid some foods or needs extra time  Level 7 ([address] [place] [address]): Independent for all p.o.  MARLI. (2003). National Outcomes Measurement System (NOMS): Adult Speech-Language Pathology User's Guide. Pain:  Pain Scale 1: Numeric (0 - 10)  Pain Intensity 1: 0     After treatment:   Patient left in no apparent distress in bed and Call bell within reach    COMMUNICATION/EDUCATION:   Patient was educated regarding his deficit(s) of mild oral dysphagia as this relates to his diagnosis of CVA workup, edentulousness.   He demonstrated Good understanding as evidenced by his responses. The patient's plan of care including recommendations, planned interventions, and recommended diet changes were discussed with: Registered nurse.      Thank you for this referral.  LIA Soliz  Time Calculation: 20 mins

## 2022-01-05 NOTE — CONSULTS
Consult dictated. 60-year-old with dementia presenting with somewhat vague symptoms of vision disturbance and confusion which seems to have resolved. MRI shows punctate infarct in the right occipital lobe, likely an incidental finding. CTA is negative. Echo is pending and if unremarkable, okay to discharge back home to long-term care facility where he resides. Continue aspirin and statin.   Tayla Trujillo MD

## 2022-01-05 NOTE — PROGRESS NOTES
Bedside and Verbal shift change report given to Reyna (oncoming nurse) by Essence Lentz RN (offgoing nurse). Report included the following information ED Summary and Intake/Output. MRI form filled out over the phone with patient's wife Hailee Posadas.

## 2022-01-05 NOTE — CONSULTS
[address]    Name:  Christiano Becerril  MR#:  [id number]  :  1931  ACCOUNT #:  [de-identified]  DATE OF SERVICE:  2022    REQUESTING PHYSICIAN:  Dr. Ade Edwards. REASON FOR EVALUATION:  Stroke. HISTORY OF PRESENT ILLNESS:  The patient is a 27-year-old with history of dementia, who is currently living at a long-term care facility along with his wife. He is a poor historian and is unable to tell me what prompted the hospitalization. Reportedly, he had some confusion and some blurring of vision which appears to have resolved now. He does not report any changes to his baseline vision. Denies any focal motor or sensory deficits, headache, chest pain, palpitations, nausea, or vomiting. He had an MRI of the brain which showed a punctate infarct in the left occipital lobe. He reports that he takes low-dose aspirin on a daily basis. PAST MEDICAL HISTORY:  As mentioned above. SOCIAL HISTORY:  Lives at a skilled nursing facility. Denies any smoking or alcohol use. His wife lives there as well and she is 80. HOME MEDICATIONS:  Aspirin 81 mg daily. PHYSICAL EXAMINATION:  GENERAL:  The patient is sitting in bed in no acute distress. VITAL SIGNS:  Blood pressure 113/59, temperature 97.9, pulse is 67. HEART:  Regular rate and rhythm. CHEST:  Clear. ABDOMEN:  Soft, nontender. Positive bowel sounds. EXTREMITIES:  No edema. NEUROLOGIC:  He is able to answer simple questions and follows all commands. Pupils are equal and reactive. Visual fields appear intact. Face is symmetric. Facial sensation is intact. Hearing is baseline. Muscle tone and bulk normal.  Strength normal in both upper and lower extremities. DTRs 2/2 symmetric. Toes downgoing. Sensation intact. Able to stand up with minimal assist, and at baseline uses a rollator as needed. LABORATORY DATA:  CBC with WBC 6.2, hemoglobin 11.1, hematocrit 36.5, platelets 008.   Chemistry, sodium 134, potassium 4.2, BUN 40, creatinine 2.22. Lipid profile, triglycerides 131, HDL 35, LDL is 67.8. Hemoglobin A1c is 6.3. CTA of the head and neck did not show any hemodynamically significant stenosis or occlusion. MRI scan of the brain shows punctate acute to subacute infarct in the right occipital lobe. There is generalized parenchymal volume loss. 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 ASSESSMENT AND PLAN:  A 60-year-old male with mild dementia, presenting with somewhat vague symptoms of vision disturbance and confusion which seems to have resolved. MRI shows punctate infarct in the right occipital lobe which is likely an incidental finding. CTA of the head and neck is negative. Echocardiogram is pending, and if this is unremarkable, okay to discharge back to long-term care facility where he resides. His CTA also showed borderline enlargement of the prevascular mediastinal lymph nodes and we will defer any further workup in this regard, if indicated to the primary team.  Continue aspirin and statin. Please call with questions. Thank you for this consultation.       MD MODESTA [email or web address]  D:  01/05/2022 13:28  T:  01/05/2022 15:13  JOB #:  [id number]

## 2022-01-05 NOTE — PROGRESS NOTES
Problem: Self Care Deficits Care Plan (Adult)  Goal: *Acute Goals and Plan of Care (Insert Text)  Description: FUNCTIONAL STATUS PRIOR TO ADMISSION: Patient admitted from [address] [place] [address], is a limited historian d/t hx of dementia but receives assist for ADLs and mobility with RW support. HOME SUPPORT: The patient lived at [address] [place] [address]. Occupational Therapy Goals  Initiated 1/5/2022  1. Patient will perform grooming with supervision/set-up within 7 day(s). 2.  Patient will perform bathing with moderate assistance within 7 day(s). 3.  Patient will perform upper & lower body dressing with moderate assistance  within 7 day(s). 4.  Patient will perform toilet transfers with moderate assistance within 7 day(s). 5.  Patient will perform all aspects of toileting with moderate assistance  within 7 day(s). 6.  Patient will participate in upper extremity therapeutic exercise/activities within his ROM with supervision/set-up for 10 minutes within 7 day(s). 7.  Patient will utilize energy conservation techniques during functional activities with verbal and visual cues within 7 day(s). Outcome: Not Met     OCCUPATIONAL THERAPY EVALUATION  Patient: Rich Mendoza (9 y.o. male)  Date: 1/5/2022  Primary Diagnosis: Blurred vision [H53.8]  Confusion [R41.0]        Precautions: Fall    ASSESSMENT  Based on the objective data described below, the patient presents with decreased balance, strength, AROM, coordination, cognition (safety awareness, memory, initiation, orientation, judgment), activity tolerance, and functional reach s/p admission for AMS & blurred vision, MRI+ for acute R occipital CVA. At baseline, patient resides at [address] [place] [address], receives assist for ADLs and mobility with RW, hx of dementia. He now presents slightly below his baseline, increased assist for functional transfers with Mod A x2 & RW support, however progressed to CGA-Min A with RW for short-distance mobility.  Crepitus noted in most proximal joints and spine, decreased BUE>BLE AROM requiring Mod-Max A for upper and lower body dressing tasks seated & standing. Patient with smooth visual tracking, reports no blurriness or diplopia, vision not limiting activity, weakness & ROM deficits moreso generalized. Given his PLOF and PMH, feel he would be best suited for return to LTC with assist; if LTC is unable to accommodate his physical needs then recommend SNF. Current Level of Function Impacting Discharge (ADLs/self-care): Mod-Total A for ADLs, Min-Mod A x1-2 for limited OOB mobility with RW    Functional Outcome Measure: The patient scored 20/100 on the Barthel Index indicating he is totally dependent in basic self care. Other factors to consider for discharge: fall risk, assist of 1-2, PMH, PLOF     Patient will benefit from skilled therapy intervention to address the above noted impairments. PLAN :  Recommendations and Planned Interventions: self care training, functional mobility training, therapeutic exercise, balance training, therapeutic activities, cognitive retraining, endurance activities, patient education, home safety training, and family training/education    Frequency/Duration: Patient will be followed by occupational therapy 3 times a week to address goals. Recommendation for discharge: (in order for the patient to meet his/her long term goals)  Return to LTC with assist PRN  If unable, recommend SNF    This discharge recommendation:  Has been made in collaboration with the attending provider and/or case management    IF patient discharges home will need the following DME: To be determined (TBD)         SUBJECTIVE:   Patient stated My vision is pretty good for my age. . I think.     OBJECTIVE DATA SUMMARY:   HISTORY:   Past Medical History:   Diagnosis Date    Dementia ([address]    No past surgical history on file.   Expanded or extensive additional review of patient history:     Home Situation  Home Environment: Long term care  Care Facility Name: [facility] [address]: Spouse/Significant Other,Child(charlie),[facility] [address]  Current DME Used/Available at Home: [employer], rolling  Tub or Shower Type: Shower    Hand dominance: Right    EXAMINATION OF PERFORMANCE DEFICITS:  Cognitive/Behavioral Status:  Neurologic State: Alert  Orientation Level: Oriented to person;Oriented to place; Disoriented to time;Disoriented to situation  Cognition: Decreased attention/concentration; Follows commands;Poor safety awareness  Perception: Appears intact  Perseveration: No perseveration noted  Safety/Judgement: Awareness of environment;Decreased awareness of need for assistance;Decreased awareness of need for safety;Decreased insight into deficits    Skin: Appears intact    Edema: None    Hearing:       Vision/Perceptual:    Tracking: Able to track stimulus in all quadrants w/o difficulty                 Diplopia: No    Acuity: Impaired near vision; Impaired far vision    Corrective Lenses: Glasses (wears at baseline, reports his broke at [facility] w/ no replace)    Range of Motion:  AROM: Generally decreased, functional (limited bilat UE)  PROM: Generally decreased, functional                      Strength:  Strength: Generally decreased, functional                Coordination:  Coordination: Generally decreased, functional  Fine Motor Skills-Upper: Left Impaired;Right Impaired    Gross Motor Skills-Upper: Left Impaired;Right Impaired    Tone & Sensation:  Tone: Normal  Sensation: Intact                      Balance:  Sitting: Impaired  Sitting - Static: Good (unsupported)  Sitting - Dynamic: Good (unsupported)  Standing: Impaired  Standing - Static: Fair  Standing - Dynamic : Fair;Poor;Constant support    Functional Mobility and Transfers for ADLs:  Bed Mobility:  Supine to Sit:  (received EOB)    Transfers:  Sit to Stand:  Moderate assistance;Assist x2  Stand to Sit: Moderate assistance;Assist x2 (Poor eccentric control )  Toilet Transfer : Moderate assistance;Assist x2;Adaptive equipment    ADL Assessment:  Feeding: Setup    Oral Facial Hygiene/Grooming: Minimum assistance    Bathing: Maximum assistance    Upper Body Dressing: Moderate assistance    Lower Body Dressing: Maximum assistance    Toileting: Total assistance                ADL Intervention and task modifications:  Feeding  Feeding Assistance: Minimum assistance  Container Management: Minimum assistance  Food to Mouth: Set-up  Drink to Mouth: Set-up  Cues: Physical assistance; Tactile cues provided;Verbal cues provided;Visual cues provided      Lower Body Dressing Assistance  Dressing Assistance: Maximum assistance  Pants With Elastic Waist: Maximum assistance  Socks: Minimum assistance  Leg Crossed Method Used: No (unable)  Position Performed: Seated edge of bed;Standing  Cues: Physical assistance; Tactile cues provided;Visual cues provided;Verbal cues provided         Cognitive Retraining  Safety/Judgement: Awareness of environment;Decreased awareness of need for assistance;Decreased awareness of need for safety;Decreased insight into deficits      Functional Measure:    Barthel Index:  Bathin  Bladder: 5  Bowels: 5  Groomin  Dressin  Feedin  Mobility: 0  Stairs: 0  Toilet Use: 0  Transfer (Bed to Chair and Back): 5  Total: 20/100      The Barthel ADL Index: Guidelines  1. The index should be used as a record of what a patient does, not as a record of what a patient could do. 2. The main aim is to establish degree of independence from any help, physical or verbal, however minor and for whatever reason. 3. The need for supervision renders the patient not independent. 4. A patient's performance should be established using the best available evidence. Asking the patient, friends/relatives and nurses are the usual sources, but direct observation and common sense are also important. However direct testing is not needed.   5. Usually the patient's performance over the preceding 24-48 hours is important, but occasionally longer periods will be relevant. 6. Middle categories imply that the patient supplies over 50 per cent of the effort. 7. Use of aids to be independent is allowed. Score Interpretation (from [facility])    Independent   60-79 Minimally independent   40-59 Partially dependent   20-39 Very dependent   <20 Totally dependent     -Quynh Lopes., Barthel, D.W. (1965). Functional evaluation: the Barthel Index. [address] ([address] (1997). The Barthel activities of daily living index: self-reporting versus actual performance in the old (> or = 75 years). Journal of [address] [place] [address] 45(7), [facility], MANOHAR, Kendrick Jordan., Katie Rodriguez. (1999). Measuring the change in disability after inpatient rehabilitation; comparison of the responsiveness of the Barthel Index and Functional [place] Measure. Journal of Neurology, Neurosurgery, and Psychiatry, 66(4), 445-724. Buffy Churchill [place].HEIDI, PATY Caballero, & Bindu Monzon MYUMIKO. (2004) Assessment of post-stroke quality of life in cost-effectiveness studies: The usefulness of the Barthel Index and the EuroQoL-5D.  Quality of Life Research, 15, 644-06         Occupational Therapy Evaluation Charge Determination   History Examination Decision-Making   LOW Complexity : Brief history review  MEDIUM Complexity : 3-5 performance deficits relating to physical, cognitive , or psychosocial skils that result in activity limitations and / or participation restrictions LOW Complexity : No comorbidities that affect functional and no verbal or physical assistance needed to complete eval tasks       Based on the above components, the patient evaluation is determined to be of the following complexity level: LOW   Pain Rating:  Baseline OA pain reported, none acute    Activity Tolerance:   Fair    After treatment patient left in no apparent distress:    Sitting in chair, Call bell within reach, and Bed / chair alarm activated    COMMUNICATION/EDUCATION:   The patients plan of care was discussed with: Physical therapist, Registered nurse, and Case management. Home safety education was provided and the patient/caregiver indicated understanding., Patient/family have participated as able in goal setting and plan of care. , and Patient/family agree to work toward stated goals and plan of care. This patients plan of care is appropriate for delegation to REYES.     Thank you for this referral.  BRYSON Munoz, OTR/L  Time Calculation: 27 mins

## 2022-01-05 NOTE — PROGRESS NOTES
[facility] Adult  Hospitalist Group                                                                                          Hospitalist Progress Note  Vic Chatman MD  Answering service: [phone] from in house phone        Date of Service:  2022  NAME:  Cindy Ceravntes  :  1931  MRN:  [id number]      Admission Summary:   Cindy Cervantes is a 80 y.o. male with a pmhx dementia who presents from snf with reportedconfusion, and blurred vision. Pt states to me the his vision impairment is chronic. Per chart review, pt. Acute developed r eye vision loss and altered mental status.     In the Ed, code neuro was called. CT head, and CTA head/neck were obtained,and negative for acute cva. Teleneurology was consulted, and recommended admission for stroke w/u. He presented on  with similar sx, and was discharged from ED.     In the ED, VSS. Labs were significant for worsening creatinine to 2.2.       Interval history / Subjective:   Patient seen examined at bedside earlier. No acute complaints at this time.   MRI of the brain showed occipital infarct which was explained to patient at bedside     Assessment & Plan:     CVA  Blurred vision  Confusion  -MRI of the brain showed occipital acute infarct  Currently asymptomatic  Pending TTE  Neurology consulted, appreciate recs  Permissive hypertension  PT OT  Continue aspirin statin    LAMINE on CKD stage IIIb  Patient was on gentle IV fluids overnight awaiting labs from this morning  -cont to monitor serial BMPs      Code status: full  DVT prophylaxis: Heparin subcu    Care Plan discussed with: Patient/Family  Anticipated Disposition: TBD  Anticipated Discharge: 24 hours to 48 hours     Hospital Problems  Never Reviewed          Codes Class Noted POA    Blurred vision ICD-10-CM: H53.8  ICD-9-CM: 368.8  2022 Unknown        Confusion ICD-10-CM: R41.0  ICD-9-CM: 298.9  2022 Unknown                Review of Systems:   HEIDI comprehensive review of systems was negative except for that written in the HPI. Vital Signs:    Last 24hrs VS reviewed since prior progress note. Most recent are:  Visit Vitals  /73 (BP 1 Location: Right upper arm, BP Patient Position: At rest)   Pulse 76   Temp 98.4 °F (36.9 °C)   Resp 14   Wt 92.3 kg (203 lb 7.8 oz)   SpO2 100%   BMI 30.05 kg/m²       No intake or output data in the 24 hours ending 01/05/22 0950     Physical Examination:     I had a face to face encounter with this patient and independently examined them on 1/5/2022 as outlined below:          Constitutional:  No acute distress, cooperative, pleasant, slight left-sided facial droop   ENT:  Oral mucosa moist, oropharynx benign. Resp:  CTA bilaterally. No wheezing/rhonchi/rales. No accessory muscle use   CV:  Regular rhythm, normal rate, no murmurs, gallops, rubs    GI:  Soft, non distended, non tender. normoactive bowel sounds, no hepatosplenomegaly     Musculoskeletal:  No edema, warm, 2+ pulses throughout    Neurologic:  Moves all extremities. AAOx3, CN II-XII reviewed            Data Review:    Review and/or order of clinical lab test  Review and/or order of tests in the radiology section of CPT  Review and/or order of tests in the medicine section of CPT      Labs:     Recent Labs     01/04/22  1613   WBC 6.2   HGB 11.1*   HCT 36.5*        Recent Labs     01/04/22  1613   *   K 4.2      CO2 24   BUN 40*   CREA 2.22*   *   CA 9.6     Recent Labs     01/04/22  1613   ALT 27   AP 69   TBILI 0.4   TP 8.4*   ALB 3.2*   GLOB 5.2*     Recent Labs     01/04/22  1613   INR 1.0   PTP 10.5   APTT 24.2      No results for input(s): FE, TIBC, PSAT, FERR in the last 72 hours. No results found for: FOL, RBCF   No results for input(s): PH, PCO2, PO2 in the last 72 hours. No results for input(s): CPK, CKNDX, TROIQ in the last 72 hours.     No lab exists for component: CPKMB  Lab Results   Component Value Date/Time Cholesterol, total 129 01/05/2022 04:48 AM    HDL Cholesterol 35 01/05/2022 04:48 AM    LDL, calculated 67.8 01/05/2022 04:48 AM    Triglyceride 131 01/05/2022 04:48 AM    CHOL/HDL Ratio 3.7 01/05/2022 04:48 AM     Lab Results   Component Value Date/Time    Glucose (POC) 90 01/05/2022 07:24 AM    Glucose (POC) 152 (H) 01/04/2022 11:07 PM     No results found for: COLOR, APPRN, SPGRU, REFSG, JOSE G, PROTU, GLUCU, KETU, BILU, UROU, STEPHANIE, LEUKU, GLUKE, EPSU, BACTU, WBCU, RBCU, CASTS, UCRY      Medications Reviewed:     Current Facility-Administered Medications   Medication Dose Route Frequency    0.9% sodium chloride infusion  75 mL/hr IntraVENous CONTINUOUS    atorvastatin (LIPITOR) tablet 40 mg  40 mg Oral DAILY    heparin (porcine) injection 5,000 Units  5,000 Units SubCUTAneous Q8H    acetaminophen (TYLENOL) tablet 650 mg  650 mg Oral Q4H PRN    Or    acetaminophen (TYLENOL) solution 650 mg  650 mg Per NG tube Q4H PRN    Or    acetaminophen (TYLENOL) suppository 650 mg  650 mg Rectal Q4H PRN    aspirin chewable tablet 81 mg  81 mg Oral DAILY     ______________________________________________________________________  EXPECTED LENGTH OF STAY: - - -  ACTUAL LENGTH OF STAY:          0                 Marion Begum MD

## 2022-01-05 NOTE — DISCHARGE SUMMARY
Discharge Summary       PATIENT ID: Elizabeth Weston  MRN: [id number]   YOB: 1931    DATE OF ADMISSION: 1/4/2022  3:43 PM    DATE OF DISCHARGE: 01/05/22   PRIMARY CARE PROVIDER: Jessie Todd MD     ATTENDING PHYSICIAN: Rocky Robertson MD  DISCHARGING PROVIDER: Rocky Robertson MD    To contact this individual call [phone] and ask the  to page. If unavailable ask to be transferred the Adult Hospitalist Department. CONSULTATIONS: IP CONSULT TO NEUROLOGY    PROCEDURES/SURGERIES: * No surgery found *    ADMITTING DIAGNOSES & HOSPITAL COURSE:   HPI: \"Kayode Celis is a 80 y. o. male with a pmhx dementia who presents from snf with reportedconfusion, and blurred vision.  Pt states to me the his vision impairment is chronic. Per chart review, pt. Acute developed r eye vision loss and altered mental status.     In the Ed, code neuro was called. CT head, and CTA head/neck were obtained,and negative for acute cva.  Teleneurology was consulted, and recommended admission for stroke w/uArnoldo Mcdaniel presented on 12/21 with similar sx, and was discharged from ED.     In the ED, VSS.  Labs were significant for worsening creatinine to 2. 2.\"    Update from  15:44 1/5/2022: Patient cannot go due to transport issues today, plan for d/c tomorrow am    Patient managed for confusion and blurred vision. MRI of the brain showed a punctate infarct in the right occipital lobe however neurology evaluated the patient and thinks this is an incidental finding could have been TIA. TTE was done which showed no evidence of PFO. Patient continued on aspirin, statin. DC back to SNF with continue PT OT there.         DISCHARGE DIAGNOSES / PLAN:      Probable TIA  Blurred vision  Confusion  -MRI of the brain showed occipital acute punctate lesion, it by neurology this may be an incidental finding  Currently asymptomatic  Pending TTE  Neurology consulted, appreciate recs  Permissive hypertension  PT OT  Continue aspirin statin     LAMINE on CKD stage IIIb  Patient was on gentle IV fluids overnight awaiting labs from this morning  -cont to monitor serial BMPs        Code status: full  DVT prophylaxis: Heparin subcu     Care Plan discussed with: Patient/Family  Anticipated Disposition: TBD  Anticipated Discharge: 24 hours to 48 hours         FOLLOW UP APPOINTMENTS:    Follow-up Information     Follow up With Specialties Details Why Contact Info    Lidya Nicholson MD Internal Medicine In 3 days  [address] [facility] 2 [facility]  [facility]      Fidelia Roth MD Neurology In 1 week  [address] [place] [address]  [phone]             ADDITIONAL CARE RECOMMENDATIONS: CBC, BMP 3-5    DIET: Resume previous diet    ACTIVITY: Activity as tolerated  \  Current Discharge Medication List      START taking these medications    Details   aspirin 81 mg chewable tablet Take 1 Tablet by mouth daily for 30 days. Qty: 30 Tablet, Refills: 0  Start date: 1/6/2022, End date: 2/5/2022      atorvastatin (LIPITOR) 40 mg tablet Take 1 Tablet by mouth daily for 30 days. Qty: 30 Tablet, Refills: 0  Start date: 1/6/2022, End date: 2/5/2022               NOTIFY YOUR PHYSICIAN FOR ANY OF THE FOLLOWING:   Fever over 101 degrees for 24 hours. Chest pain, shortness of breath, fever, chills, nausea, vomiting, diarrhea, change in mentation, falling, weakness, bleeding. Severe pain or pain not relieved by medications. Or, any other signs or symptoms that you may have questions about.     DISPOSITION:    Home With:   OT  PT  HH  RN      x Long term SNF/Inpatient Rehab    Independent/assisted living    Hospice    Other:       PATIENT CONDITION AT DISCHARGE:     Functional status    Poor    x Deconditioned     Independent      Cognition     Lucid     Forgetful    x Dementia        Code status    x Full code     DNR      PHYSICAL EXAMINATION AT DISCHARGE:   Refer to Progress Note      CHRONIC MEDICAL DIAGNOSES:  Problem List as of 1/5/2022 Never Reviewed          Codes Class Noted - Resolved    Blurred vision ICD-10-CM: H53.8  ICD-9-CM: 368.8  1/4/2022 - Present        Confusion ICD-10-CM: R41.0  ICD-9-CM: 298.9  1/4/2022 - Present              Greater than 30  minutes were spent with the patient on counseling and coordination of care    Signed:   Lio Robles MD  1/5/2022  2:38 PM

## 2022-01-05 NOTE — PROGRESS NOTES
Problem: Mobility Impaired (Adult and Pediatric)  Goal: *Acute Goals and Plan of Care (Insert Text)  Description: FUNCTIONAL STATUS PRIOR TO ADMISSION: Pt poor historian (baseline dementia). Was admitted from SNF and reports using a RW. Physical Therapy Goals  Initiated 1/5/2022  1. Patient will move from supine to sit and sit to supine , scoot up and down, and roll side to side in bed with supervision/set-up within 7 day(s). 2.  Patient will transfer from bed to chair and chair to bed with minimal assistance/contact guard assist using the least restrictive device within 7 day(s). 3.  Patient will perform sit to stand with minimal assistance/contact guard assist within 7 day(s). 4.  Patient will ambulate with contact guard for 50 feet with the least restrictive device within 7 day(s). Outcome: Not Met  PHYSICAL THERAPY EVALUATION  Patient: Bonnye Gottron (4 y.o. male)  Date: 1/5/2022  Primary Diagnosis: Blurred vision [H53.8]  Confusion [R41.0]        Precautions:     ASSESSMENT  Based on the objective data described below, the patient presents with general weakness, dementia, decreased UE ROM, forward trunk lean, decreased dynamic balance, limited AROM B Ues. Pt poor historian (baseline dementia). Was admitted from SNF and reports using a RW. Pt required a modA x2 for sit to stand from bed, and minAx2 from chair. He was a minAx1 using a RW for 15ft. During ambulated he presented with forward flexed posture, anterior weight shift, overall unsteadiness, and shuffled gait. Recommending SNF upon discharge. Current Level of Function Impacting Discharge (mobility/balance): ModAx2 for transfers    Functional Outcome Measure: The patient scored 3/56 on the Lobato outcome measure which is indicative of high fall risk.       Other factors to consider for discharge: from SNF, baseline dementia, fall risk, RW at baseline     Patient will benefit from skilled therapy intervention to address the above noted impairments. PLAN :  Recommendations and Planned Interventions: bed mobility training, transfer training, gait training, therapeutic exercises, neuromuscular re-education, patient and family training/education, and therapeutic activities      Frequency/Duration: Patient will be followed by physical therapy:  3 times a week to address goals. Recommendation for discharge: (in order for the patient to meet his/her long term goals)  Therapy up to 5 days/week in SNF setting    This discharge recommendation:  Has not yet been discussed the attending provider and/or case management    IF patient discharges home will need the following DME: to be determined (TBD)         SUBJECTIVE:   Patient stated I'm doing well for my [de-identified].      OBJECTIVE DATA SUMMARY:   HISTORY:    Past Medical History:   Diagnosis Date    Dementia ([facility] [address]    No past surgical history on file.       Home Situation  Home Environment: Long term care  Care Facility Name: [facility]: Spouse/Significant Other,Child(charlie),Long Term Care [address]  Current DME Used/Available at Home: Walker, rolling  Tub or Shower Type: Shower    EXAMINATION/PRESENTATION/DECISION MAKING:   Critical Behavior:  Neurologic State: Alert  Orientation Level: Oriented to person,Oriented to place,Disoriented to time,Disoriented to situation  Cognition: Decreased attention/concentration,Follows commands,Poor safety awareness  Safety/Judgement: Awareness of environment,Decreased awareness of need for assistance,Decreased awareness of need for safety,Decreased insight into deficits    Range Of Motion:  AROM: Generally decreased, functional (limited bilat UE)           PROM: Generally decreased, functional           Strength:    Strength: Generally decreased, functional                    Tone & Sensation:   Tone: Normal              Sensation: Intact               Coordination:  Coordination: Generally decreased, functional  Vision:      Functional Mobility:  Bed Mobility:     Supine to Sit:  (received EOB)        Transfers:  Sit to Stand: Moderate assistance;Assist x2  Stand to Sit: Moderate assistance;Assist x2 (Poor eccentric control )                       Balance:   Sitting: Impaired  Sitting - Static: Good (unsupported)  Sitting - Dynamic: Good (unsupported)  Standing: Impaired  Standing - Static: Fair  Standing - Dynamic : Fair;Poor;Constant support  Ambulation/Gait Training:  Distance (ft): 15 Feet (ft)  Assistive Device: Walker, rolling;Gait belt  Ambulation - Level of Assistance: Minimal assistance        Gait Abnormalities: Trunk sway increased; Shuffling gait (Forward flexed )        Base of Support: Center of gravity altered         Functional Measure:  Lobato Balance Test    Sitting to Standin  Standing Unsupported: 0  Sitting with Back Unsupported: 3  Standing to Sittin  Transfers: 0  Standing Unsupported with Eyes Closed: 0  Standing Unsupported with Feet Together: 0  Reach Forward with Outstretched Arm: 0   Object: 0  Turn to Look Over Shoulders: 0  Turn 360 Degrees: 0  Alternate Foot on Step/Stool: 0  Standing Unsupported One Foot in Front: 0  Stand on One Le  Total: 3/56         56=Maximum possible score;   0-20=High fall risk  21-40=Moderate fall risk   41-56=Low fall risk                Physical Therapy Evaluation Charge Determination   History Examination Presentation Decision-Making   HIGH Complexity :3+ comorbidities / personal factors will impact the outcome/ POC  HIGH Complexity : 4+ Standardized tests and measures addressing body structure, function, activity limitation and / or participation in recreation  LOW Complexity : Stable, uncomplicated  Other outcome measures Lobato  HIGH       Based on the above components, the patient evaluation is determined to be of the following complexity level: LOW         Activity Tolerance:   Fair    After treatment patient left in no apparent distress:   Sitting in chair, Call bell within reach, and Bed / chair alarm activated    COMMUNICATION/EDUCATION:   The patients plan of care was discussed with: Occupational therapist and Registered nurse. Fall prevention education was provided and the patient/caregiver indicated understanding., Patient/family have participated as able in goal setting and plan of care. , and Patient/family agree to work toward stated goals and plan of care. Thank you for this referral.  Dominguez Olivera, SPT   Time Calculation: 26 mins      Regarding student involvement in patient care:  A student participated in this treatment session. Per CMS Medicare statements and APTA guidelines I certify that the following was true:  1. I was present and directly observed the entire session. 2. I made all skilled judgments and clinical decisions regarding care. 3. I am the practitioner responsible for assessment, treatment, and documentation.

## 2022-01-06 VITALS
WEIGHT: 202.82 LBS | RESPIRATION RATE: 12 BRPM | SYSTOLIC BLOOD PRESSURE: 139 MMHG | HEART RATE: 75 BPM | DIASTOLIC BLOOD PRESSURE: 79 MMHG | OXYGEN SATURATION: 96 % | HEIGHT: 69 IN | TEMPERATURE: 97.8 F | BODY MASS INDEX: 30.04 KG/M2

## 2022-01-06 LAB
ANION GAP SERPL CALC-SCNC: 9 MMOL/L (ref 5–15)
BASOPHILS # BLD: 0 K/UL (ref 0–0.1)
BASOPHILS NFR BLD: 1 % (ref 0–1)
BUN SERPL-MCNC: 24 MG/DL (ref 6–20)
BUN/CREAT SERPL: 19 (ref 12–20)
CALCIUM SERPL-MCNC: 9.1 MG/DL (ref 8.5–10.1)
CHLORIDE SERPL-SCNC: 103 MMOL/L (ref 97–108)
CO2 SERPL-SCNC: 24 MMOL/L (ref 21–32)
CREAT SERPL-MCNC: 1.29 MG/DL (ref 0.7–1.3)
DIFFERENTIAL METHOD BLD: ABNORMAL
EOSINOPHIL # BLD: 0.2 K/UL (ref 0–0.4)
EOSINOPHIL NFR BLD: 4 % (ref 0–7)
ERYTHROCYTE [DISTWIDTH] IN BLOOD BY AUTOMATED COUNT: 15.5 % (ref 11.5–14.5)
GLUCOSE BLD STRIP.AUTO-MCNC: 84 MG/DL (ref 65–117)
GLUCOSE BLD STRIP.AUTO-MCNC: 94 MG/DL (ref 65–117)
GLUCOSE SERPL-MCNC: 94 MG/DL (ref 65–100)
HCT VFR BLD AUTO: 38.2 % (ref 36.6–50.3)
HGB BLD-MCNC: 11.5 G/DL (ref 12.1–17)
IMM GRANULOCYTES # BLD AUTO: 0 K/UL (ref 0–0.04)
IMM GRANULOCYTES NFR BLD AUTO: 1 % (ref 0–0.5)
LYMPHOCYTES # BLD: 1.3 K/UL (ref 0.8–3.5)
LYMPHOCYTES NFR BLD: 25 % (ref 12–49)
MCH RBC QN AUTO: 28.5 PG (ref 26–34)
MCHC RBC AUTO-ENTMCNC: 30.1 G/DL (ref 30–36.5)
MCV RBC AUTO: 94.6 FL (ref 80–99)
MONOCYTES # BLD: 0.6 K/UL (ref 0–1)
MONOCYTES NFR BLD: 13 % (ref 5–13)
NEUTS SEG # BLD: 2.9 K/UL (ref 1.8–8)
NEUTS SEG NFR BLD: 56 % (ref 32–75)
NRBC # BLD: 0 K/UL (ref 0–0.01)
NRBC BLD-RTO: 0 PER 100 WBC
PLATELET # BLD AUTO: 258 K/UL (ref 150–400)
PMV BLD AUTO: 9.6 FL (ref 8.9–12.9)
POTASSIUM SERPL-SCNC: 4 MMOL/L (ref 3.5–5.1)
RBC # BLD AUTO: 4.04 M/UL (ref 4.1–5.7)
SERVICE CMNT-IMP: NORMAL
SERVICE CMNT-IMP: NORMAL
SODIUM SERPL-SCNC: 136 MMOL/L (ref 136–145)
WBC # BLD AUTO: 5.1 K/UL (ref 4.1–11.1)

## 2022-01-06 PROCEDURE — G0378 HOSPITAL OBSERVATION PER HR: HCPCS

## 2022-01-06 PROCEDURE — 85025 COMPLETE CBC W/AUTO DIFF WBC: CPT

## 2022-01-06 PROCEDURE — 65660000000 HC RM CCU STEPDOWN

## 2022-01-06 PROCEDURE — 80048 BASIC METABOLIC PNL TOTAL CA: CPT

## 2022-01-06 PROCEDURE — 74011250637 HC RX REV CODE- 250/637: Performed by: FAMILY MEDICINE

## 2022-01-06 PROCEDURE — 74011250637 HC RX REV CODE- 250/637: Performed by: STUDENT IN AN ORGANIZED HEALTH CARE EDUCATION/TRAINING PROGRAM

## 2022-01-06 PROCEDURE — 74011250636 HC RX REV CODE- 250/636: Performed by: STUDENT IN AN ORGANIZED HEALTH CARE EDUCATION/TRAINING PROGRAM

## 2022-01-06 PROCEDURE — 36415 COLL VENOUS BLD VENIPUNCTURE: CPT

## 2022-01-06 PROCEDURE — 96372 THER/PROPH/DIAG INJ SC/IM: CPT

## 2022-01-06 PROCEDURE — 82962 GLUCOSE BLOOD TEST: CPT

## 2022-01-06 RX ADMIN — HEPARIN SODIUM 5000 UNITS: 5000 INJECTION INTRAVENOUS; SUBCUTANEOUS at 08:49

## 2022-01-06 RX ADMIN — ASPIRIN 81 MG CHEWABLE TABLET 81 MG: 81 TABLET CHEWABLE at 08:49

## 2022-01-06 RX ADMIN — HEPARIN SODIUM 5000 UNITS: 5000 INJECTION INTRAVENOUS; SUBCUTANEOUS at 01:24

## 2022-01-06 RX ADMIN — ATORVASTATIN CALCIUM 40 MG: 40 TABLET, FILM COATED ORAL at 08:49

## 2022-01-06 NOTE — PROGRESS NOTES
Transition of Care Plan  · RUR- Observation   · DISPOSITION: [facility] - [address] [place] [address]  · F/U with PCP/Specialist    · [facility]- scheduled for 11:30 am today    Covid-19 positive on 1/5/22    CM called [facility] to discuss discharge, left a message to call this CM back. Sadi Fisher MSA, RN, CRM. CM notified [facility] of patient's Covid-19 positive result. They will accept patient back. CM faxed result to her at [phone]     Transition of Care Plan to SNF/Rehab    SNF/Rehab Transition:  Patient has been accepted to [facility] and meets criteria for admission. Patient will transported by [facility] and expected to leave at 11:30 AM    Communication to Patient/Family:  Spoke with patient's wife Sheba Larson as patient in isolation and they are agreeable to the transition plan. Communication to SNF/Rehab:  Bedside RNFrance  has been notified to update the transition plan to the facility and call report (phone number [phone]  Discharge information has been updated on the AVS.     Covid test result faxed to facility at [facility] [phone]. Nursing Please include all hard scripts for controlled substances, med rec and dc summary, and AVS in packet.      Reviewed and confirmed with facility, [facility] can manage the patient care needs for the following:     SNF/Rehab Transition:  PCP/Specialist:     Reviewed and confirmed with facility,  they can manage the patient care needs for the following:     Renzo Padron with (X) only those applicable:    Medication:  [x]  Medications will be available at the facility  []  IV Antibiotics   []  Controlled Substance - hard copy to be sent with patient   []  Weekly Labs   Documents:  [x] Hard RX  [x] MAR  [] Kardex  [x] AVS  []Transfer Summary  [x]Discharge   Equipment:  []  CPAP/BiPAP  []  Wound Vacuum  []  Pham or Urinary Device  []  PICC/Central Line  []  Nebulizer  []  Ventilator   Treatment:  []Isolation (for MRSA, VRE, etc.)  []Surgical Drain Management  []Tracheostomy Care  []Dressing Changes  []Dialysis with transportation and chair time . []PEG Care  []Oxygen  []Daily Weights for Heart Failure   Dietary:  []Any diet limitations  []Tube Feedings   []Total Parenteral Management (TPN)   Eligible for Medicaid Long Term Services and Supports  Yes:  [] Eligible for medical assistance or will become eligible within 180 days and UAI completed. [] Provider/Patient and/or support system has requested screening. [] UAI copy provided to patient or responsible party,   [] UAI unavailable at discharge will send once processed to SNF provider. [] UAI unavailable at discharged mailed to patient  No:   [] Private pay and is not financially eligible for Medicaid within the next 180 days. [] Reside out-of-state. [] A residents of a state owned/operated facility that is licensed  by [address] [facility] or [facility]  [] Enrollment in [facility] hospice services  []  Medical [facility]  [] Patient /Family declines to have screening completed or provide financial information for screening     Financial Resources:  Medicaid    [] Initiated and application pending   [] Full coverage     Advanced Care Plan:  []Surrogate Decision Maker of Care  []POA  []Communicated Code Status \"Full\")    Other     Medicare pt has received, reviewed, and signed 2nd IM letter informing them of their right to appeal the discharge. Signed copy has been placed on pt bedside chart.     Mamadou Ramírez MSA, RN, CRM

## 2022-01-06 NOTE — ROUTINE PROCESS
Bedside and Verbal shift change report given to Robina Chatman RN (oncoming nurse) by Leon Stevens RN (offgoing nurse). Report included the following information SBAR, Kardex, MAR, Cardiac Rhythm NSR and Dual Neuro Assessment.

## 2022-01-06 NOTE — PROGRESS NOTES
Care Management - Received call from patient's nurse that patient's COVID-19 rapid test came back positive. Patient is scheduled to leave at 9:00 AM tomorrow. CM called [facility] and spoke with Will. Patient is scheduled for 9:00 AM, but at this time it will be 12:45 AM earliest due to emergency calls they have had to make today. CM did not change the time. Left message for unit CM and sent her e-mail.      AMY Ramos

## 2022-01-06 NOTE — PROGRESS NOTES
Bedside and Verbal shift change report given to Isreal Murray (oncoming nurse) by Callie Anne (offgoing nurse). Report included the following information SBAR, Kardex, Procedure Summary, Intake/Output, MAR, Recent Results, Cardiac Rhythm NSR, Quality Measures and Dual Neuro Assessment.

## 2022-01-06 NOTE — PROGRESS NOTES
TRANSFER - OUT REPORT:    Verbal report given to Emilie Posada (name) on Bethanie Mar  being transferred to [facility]. Report consisted of patients Situation, Background, Assessment and   Recommendations(SBAR). Information from the following report(s) SBAR, Kardex, Intake/Output, MAR, Recent Results and Med Rec Status was reviewed with the receiving nurse. Opportunity for questions and clarification was provided. Patient transported with: patient belongings. Expected [facility]  time is 1245.

## 2022-01-06 NOTE — DISCHARGE SUMMARY
Discharge Summary       PATIENT ID: Nickolas Valiente  MRN: [id number]   YOB: 1931    DATE OF ADMISSION: 1/4/2022  3:43 PM    DATE OF DISCHARGE: 01/06/22   PRIMARY CARE PROVIDER: Celina Alston MD     ATTENDING PHYSICIAN: Eb Cuello MD  DISCHARGING PROVIDER: Eb Cuello MD    To contact this individual call [phone] and ask the  to page. If unavailable ask to be transferred the Adult Hospitalist Department. CONSULTATIONS: IP CONSULT TO NEUROLOGY    PROCEDURES/SURGERIES: * No surgery found *    ADMITTING DIAGNOSES & HOSPITAL COURSE:   HPI: \"Kayode Celis is a 80 y. o. male with a pmhx dementia who presents from snf with reportedconfusion, and blurred vision.  Pt states to me the his vision impairment is chronic. Per chart review, pt. Acute developed r eye vision loss and altered mental status.     In the Ed, code neuro was called. CT head, and CTA head/neck were obtained,and negative for acute cva.  Teleneurology was consulted, and recommended admission for stroke w/u. [facility] presented on 12/21 with similar sx, and was discharged from ED.     In the ED, VSS.  Labs were significant for worsening creatinine to 2. 2. \"    Patient managed for confusion and blurred vision. MRI of the brain showed a punctate infarct in the right occipital lobe however neurology evaluated the patient and thinks this is an incidental finding could have been TIA. TTE was done which showed no evidence of PFO. Patient continued on aspirin, statin. DC back to SNF with continue PT OT there.     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  [facility]      Yadira Khan MD Neurology In 1 week  [address] [facility]  [phone]             ADDITIONAL CARE RECOMMENDATIONS: CBC, BMP 3-5    DIET: Resume previous diet    ACTIVITY: Activity as tolerated  \  Discharge Medication List as of 1/6/2022 11:11 AM      START taking these medications    Details   aspirin 81 mg chewable tablet Take 1 Tablet by mouth daily for 30 days. , Print, Disp-30 Tablet, R-0      atorvastatin (LIPITOR) 40 mg tablet Take 1 Tablet by mouth daily for 30 days. , Print, Disp-30 Tablet, R-0               NOTIFY YOUR PHYSICIAN FOR ANY OF THE FOLLOWING:   Fever over 101 degrees for 24 hours. Chest pain, shortness of breath, fever, chills, nausea, vomiting, diarrhea, change in mentation, falling, weakness, bleeding. Severe pain or pain not relieved by medications. Or, any other signs or symptoms that you may have questions about.     DISPOSITION:    Home With:   OT  PT  HH  RN      x Long term SNF/Inpatient Rehab    Independent/assisted living    Hospice    Other:       PATIENT CONDITION AT DISCHARGE:     Functional status    Poor    x Deconditioned     Independent      Cognition     Lucid     Forgetful    x Dementia        Code status    x Full code     DNR      PHYSICAL EXAMINATION AT DISCHARGE:   Refer to Progress Note      CHRONIC MEDICAL DIAGNOSES:  Problem List as of 1/6/2022 Never Reviewed          Codes Class Noted - Resolved    Blurred vision ICD-10-CM: H53.8  ICD-9-CM: 368.8  1/4/2022 - Present        Confusion ICD-10-CM: R41.0  ICD-9-CM: 298.9  1/4/2022 - Present              Greater than 30  minutes were spent with the patient on counseling and coordination of care    Signed:   Yayo Tracy MD  1/6/2022  2:38 PM

## 2022-01-06 NOTE — PROGRESS NOTES
Problem: Patient Education: Go to Patient Education Activity  Goal: Patient/Family Education  Outcome: Progressing Towards Goal     Problem: TIA/CVA Stroke: Day 2 Until Discharge  Goal: Off Pathway (Use only if patient is Off Pathway)  Outcome: Progressing Towards Goal  Goal: Activity/Safety  Outcome: Progressing Towards Goal  Goal: Diagnostic Test/Procedures  Outcome: Progressing Towards Goal  Goal: Nutrition/Diet  Outcome: Progressing Towards Goal  Goal: Discharge Planning  Outcome: Progressing Towards Goal  Goal: Medications  Outcome: Progressing Towards Goal  Goal: Respiratory  Outcome: Progressing Towards Goal  Goal: Treatments/Interventions/Procedures  Outcome: Progressing Towards Goal  Goal: Psychosocial  Outcome: Progressing Towards Goal  Goal: *Verbalizes anxiety and depression are reduced or absent  Outcome: Progressing Towards Goal  Goal: *Absence of aspiration  Outcome: Progressing Towards Goal  Goal: *Absence of deep venous thrombosis signs and symptoms(Stroke Metric)  Outcome: Progressing Towards Goal  Goal: *Optimal pain control at patient's stated goal  Outcome: Progressing Towards Goal  Goal: *Tolerating diet  Outcome: Progressing Towards Goal  Goal: *Ability to perform ADLs and demonstrates progressive mobility and function  Outcome: Progressing Towards Goal  Goal: *Stroke education continued(Stroke Metric)  Outcome: Progressing Towards Goal     Problem: Ischemic Stroke: Discharge Outcomes  Goal: *Verbalizes anxiety and depression are reduced or absent  Outcome: Progressing Towards Goal  Goal: *Verbalize understanding of risk factor modification(Stroke Metric)  Outcome: Progressing Towards Goal  Goal: *Hemodynamically stable  Outcome: Progressing Towards Goal  Goal: *Absence of aspiration pneumonia  Outcome: Progressing Towards Goal  Goal: *Aware of needed dietary changes  Outcome: Progressing Towards Goal  Goal: *Verbalize understanding of prescribed medications including anti-coagulants, anti-lipid, and/or anti-platelets(Stroke Metric)  Outcome: Progressing Towards Goal  Goal: *Aware of follow-up diagnostics related to anticoagulants  Outcome: Progressing Towards Goal  Goal: *Ability to perform ADLs and demonstrates progressive mobility and function  Outcome: Progressing Towards Goal  Goal: *Absence of DVT(Stroke Metric)  Outcome: Progressing Towards Goal  Goal: *Absence of aspiration  Outcome: Progressing Towards Goal  Goal: *Optimal pain control at patient's stated goal  Outcome: Progressing Towards Goal  Goal: *Home safety concerns addressed  Outcome: Progressing Towards Goal  Goal: *Describes available resources and support systems  Outcome: Progressing Towards Goal  Goal: *Verbalizes understanding of activation of EMS(911) for stroke symptoms(Stroke Metric)  Outcome: Progressing Towards Goal  Goal: *Understands and describes signs and symptoms to report to providers(Stroke Metric)  Outcome: Progressing Towards Goal  Goal: *Neurolgocially stable (absence of additional neurological deficits)  Outcome: Progressing Towards Goal  Goal: *Verbalizes importance of follow-up with primary care physician(Stroke Metric)  Outcome: Progressing Towards Goal  Goal: *Smoking cessation discussed,if applicable(Stroke Metric)  Outcome: Progressing Towards Goal  Goal: *Depression screening completed(Stroke Metric)  Outcome: Progressing Towards Goal     Problem: Discharge Planning  Goal: *Discharge to safe environment  Outcome: Progressing Towards Goal     Problem: Patient Education: Go to Patient Education Activity  Goal: Patient/Family Education  Outcome: Progressing Towards Goal     Problem: Falls - Risk of  Goal: *Absence of Falls  Description: Document Kalen Dover Fall Risk and appropriate interventions in the flowsheet.   Outcome: Progressing Towards Goal  Note: Fall Risk Interventions:  Mobility Interventions: Bed/chair exit alarm,OT consult for ADLs,Patient to call before getting OOB,PT Consult for mobility concerns         Medication Interventions: Bed/chair exit alarm,Teach patient to arise slowly,Patient to call before getting OOB    Elimination Interventions: Call light in reach,Toileting schedule/hourly rounds,Stay With Me (per policy)              Problem: Patient Education: Go to Patient Education Activity  Goal: Patient/Family Education  Outcome: Progressing Towards Goal     Problem: Patient Education: Go to Patient Education Activity  Goal: Patient/Family Education  Outcome: Progressing Towards Goal     Problem: Airway Clearance - Ineffective  Goal: Achieve or maintain patent airway  Outcome: Progressing Towards Goal     Problem: Gas Exchange - Impaired  Goal: Absence of hypoxia  Outcome: Progressing Towards Goal  Goal: Promote optimal lung function  Outcome: Progressing Towards Goal     Problem: Breathing Pattern - Ineffective  Goal: Ability to achieve and maintain a regular respiratory rate  Outcome: Progressing Towards Goal     Problem:  Body Temperature -  Risk of, Imbalanced  Goal: Ability to maintain a body temperature within defined limits  Outcome: Progressing Towards Goal  Goal: Will regain or maintain usual level of consciousness  Outcome: Progressing Towards Goal  Goal: Complications related to the disease process, condition or treatment will be avoided or minimized  Outcome: Progressing Towards Goal     Problem: Isolation Precautions - Risk of Spread of Infection  Goal: Prevent transmission of infectious organism to others  Outcome: Progressing Towards Goal     Problem: Nutrition Deficits  Goal: Optimize nutrtional status  Outcome: Progressing Towards Goal     Problem: Risk for Fluid Volume Deficit  Goal: Maintain normal heart rhythm  Outcome: Progressing Towards Goal  Goal: Maintain absence of muscle cramping  Outcome: Progressing Towards Goal  Goal: Maintain normal serum potassium, sodium, calcium, phosphorus, and pH  Outcome: Progressing Towards Goal     Problem: Loneliness or Risk for Loneliness  Goal: Demonstrate positive use of time alone when socialization is not possible  Outcome: Progressing Towards Goal     Problem: Fatigue  Goal: Verbalize increase energy and improved vitality  Outcome: Progressing Towards Goal     Problem: Patient Education: Go to Patient Education Activity  Goal: Patient/Family Education  Outcome: Progressing Towards Goal

## 2022-03-14 ENCOUNTER — APPOINTMENT (OUTPATIENT)
Dept: CT IMAGING | Age: 87
DRG: 684 | End: 2022-03-14
Attending: EMERGENCY MEDICINE
Payer: MEDICARE

## 2022-03-14 ENCOUNTER — HOSPITAL ENCOUNTER (INPATIENT)
Age: 87
LOS: 4 days | Discharge: NURSING, REHAB OR LONG-TERM CARE | DRG: 684 | End: 2022-03-18
Attending: EMERGENCY MEDICINE | Admitting: FAMILY MEDICINE
Payer: MEDICARE

## 2022-03-14 ENCOUNTER — APPOINTMENT (OUTPATIENT)
Dept: GENERAL RADIOLOGY | Age: 87
DRG: 684 | End: 2022-03-14
Attending: EMERGENCY MEDICINE
Payer: MEDICARE

## 2022-03-14 ENCOUNTER — APPOINTMENT (OUTPATIENT)
Dept: ULTRASOUND IMAGING | Age: 87
DRG: 684 | End: 2022-03-14
Attending: FAMILY MEDICINE
Payer: MEDICARE

## 2022-03-14 DIAGNOSIS — R63.0 POOR APPETITE: ICD-10-CM

## 2022-03-14 DIAGNOSIS — N17.9 AKI (ACUTE KIDNEY INJURY) (HCC): ICD-10-CM

## 2022-03-14 DIAGNOSIS — R41.82 ALTERED MENTAL STATUS, UNSPECIFIED ALTERED MENTAL STATUS TYPE: Primary | ICD-10-CM

## 2022-03-14 DIAGNOSIS — E88.09 HYPOALBUMINEMIA: ICD-10-CM

## 2022-03-14 DIAGNOSIS — Z51.5 PALLIATIVE CARE ENCOUNTER: ICD-10-CM

## 2022-03-14 DIAGNOSIS — E87.5 ACUTE HYPERKALEMIA: ICD-10-CM

## 2022-03-14 LAB
ALBUMIN SERPL-MCNC: 2.8 G/DL (ref 3.5–5)
ALBUMIN/GLOB SERPL: 0.6 {RATIO} (ref 1.1–2.2)
ALP SERPL-CCNC: 56 U/L (ref 45–117)
ALT SERPL-CCNC: 13 U/L (ref 12–78)
ANION GAP SERPL CALC-SCNC: 8 MMOL/L (ref 5–15)
AST SERPL-CCNC: 27 U/L (ref 15–37)
BASOPHILS # BLD: 0.1 K/UL (ref 0–0.1)
BASOPHILS NFR BLD: 1 % (ref 0–1)
BILIRUB SERPL-MCNC: 0.5 MG/DL (ref 0.2–1)
BUN SERPL-MCNC: 46 MG/DL (ref 6–20)
BUN/CREAT SERPL: 15 (ref 12–20)
CALCIUM SERPL-MCNC: 9.4 MG/DL (ref 8.5–10.1)
CHLORIDE SERPL-SCNC: 101 MMOL/L (ref 97–108)
CO2 SERPL-SCNC: 24 MMOL/L (ref 21–32)
COMMENT, HOLDF: NORMAL
CREAT SERPL-MCNC: 3.13 MG/DL (ref 0.7–1.3)
DIFFERENTIAL METHOD BLD: ABNORMAL
EOSINOPHIL # BLD: 0.2 K/UL (ref 0–0.4)
EOSINOPHIL NFR BLD: 3 % (ref 0–7)
ERYTHROCYTE [DISTWIDTH] IN BLOOD BY AUTOMATED COUNT: 16.6 % (ref 11.5–14.5)
GLOBULIN SER CALC-MCNC: 4.4 G/DL (ref 2–4)
GLUCOSE SERPL-MCNC: 146 MG/DL (ref 65–100)
HCT VFR BLD AUTO: 35.1 % (ref 36.6–50.3)
HGB BLD-MCNC: 10.6 G/DL (ref 12.1–17)
IMM GRANULOCYTES # BLD AUTO: 0.1 K/UL (ref 0–0.04)
IMM GRANULOCYTES NFR BLD AUTO: 1 % (ref 0–0.5)
LYMPHOCYTES # BLD: 1.5 K/UL (ref 0.8–3.5)
LYMPHOCYTES NFR BLD: 19 % (ref 12–49)
MCH RBC QN AUTO: 29.6 PG (ref 26–34)
MCHC RBC AUTO-ENTMCNC: 30.2 G/DL (ref 30–36.5)
MCV RBC AUTO: 98 FL (ref 80–99)
MONOCYTES # BLD: 0.8 K/UL (ref 0–1)
MONOCYTES NFR BLD: 10 % (ref 5–13)
NEUTS SEG # BLD: 5.2 K/UL (ref 1.8–8)
NEUTS SEG NFR BLD: 66 % (ref 32–75)
NRBC # BLD: 0 K/UL (ref 0–0.01)
NRBC BLD-RTO: 0 PER 100 WBC
PLATELET # BLD AUTO: 242 K/UL (ref 150–400)
PMV BLD AUTO: 9.4 FL (ref 8.9–12.9)
POTASSIUM SERPL-SCNC: 6.1 MMOL/L (ref 3.5–5.1)
PROT SERPL-MCNC: 7.2 G/DL (ref 6.4–8.2)
RBC # BLD AUTO: 3.58 M/UL (ref 4.1–5.7)
SAMPLES BEING HELD,HOLD: NORMAL
SODIUM SERPL-SCNC: 133 MMOL/L (ref 136–145)
TROPONIN-HIGH SENSITIVITY: 16 NG/L (ref 0–76)
WBC # BLD AUTO: 7.8 K/UL (ref 4.1–11.1)

## 2022-03-14 PROCEDURE — 70450 CT HEAD/BRAIN W/O DYE: CPT

## 2022-03-14 PROCEDURE — 94761 N-INVAS EAR/PLS OXIMETRY MLT: CPT

## 2022-03-14 PROCEDURE — 80053 COMPREHEN METABOLIC PANEL: CPT

## 2022-03-14 PROCEDURE — 76770 US EXAM ABDO BACK WALL COMP: CPT

## 2022-03-14 PROCEDURE — 36415 COLL VENOUS BLD VENIPUNCTURE: CPT

## 2022-03-14 PROCEDURE — 93005 ELECTROCARDIOGRAM TRACING: CPT

## 2022-03-14 PROCEDURE — 99285 EMERGENCY DEPT VISIT HI MDM: CPT

## 2022-03-14 PROCEDURE — 71045 X-RAY EXAM CHEST 1 VIEW: CPT

## 2022-03-14 PROCEDURE — 74011250636 HC RX REV CODE- 250/636: Performed by: FAMILY MEDICINE

## 2022-03-14 PROCEDURE — 84484 ASSAY OF TROPONIN QUANT: CPT

## 2022-03-14 PROCEDURE — 65660000000 HC RM CCU STEPDOWN

## 2022-03-14 PROCEDURE — 85025 COMPLETE CBC W/AUTO DIFF WBC: CPT

## 2022-03-14 RX ORDER — SODIUM CHLORIDE 0.9 % (FLUSH) 0.9 %
5-40 SYRINGE (ML) INJECTION EVERY 8 HOURS
Status: DISCONTINUED | OUTPATIENT
Start: 2022-03-14 | End: 2022-03-18 | Stop reason: HOSPADM

## 2022-03-14 RX ORDER — ONDANSETRON 2 MG/ML
4 INJECTION INTRAMUSCULAR; INTRAVENOUS
Status: DISCONTINUED | OUTPATIENT
Start: 2022-03-14 | End: 2022-03-18 | Stop reason: HOSPADM

## 2022-03-14 RX ORDER — CALCIUM GLUCONATE 20 MG/ML
1 INJECTION, SOLUTION INTRAVENOUS ONCE
Status: COMPLETED | OUTPATIENT
Start: 2022-03-14 | End: 2022-03-14

## 2022-03-14 RX ORDER — HEPARIN SODIUM 5000 [USP'U]/ML
5000 INJECTION, SOLUTION INTRAVENOUS; SUBCUTANEOUS EVERY 8 HOURS
Status: DISCONTINUED | OUTPATIENT
Start: 2022-03-14 | End: 2022-03-18 | Stop reason: HOSPADM

## 2022-03-14 RX ORDER — ONDANSETRON 4 MG/1
4 TABLET, ORALLY DISINTEGRATING ORAL
Status: DISCONTINUED | OUTPATIENT
Start: 2022-03-14 | End: 2022-03-18 | Stop reason: HOSPADM

## 2022-03-14 RX ORDER — CALCIUM GLUCONATE 20 MG/ML
2 INJECTION, SOLUTION INTRAVENOUS ONCE
Status: DISCONTINUED | OUTPATIENT
Start: 2022-03-14 | End: 2022-03-14

## 2022-03-14 RX ORDER — ACETAMINOPHEN 650 MG/1
650 SUPPOSITORY RECTAL
Status: DISCONTINUED | OUTPATIENT
Start: 2022-03-14 | End: 2022-03-18 | Stop reason: HOSPADM

## 2022-03-14 RX ORDER — SODIUM CHLORIDE 0.9 % (FLUSH) 0.9 %
5-40 SYRINGE (ML) INJECTION AS NEEDED
Status: DISCONTINUED | OUTPATIENT
Start: 2022-03-14 | End: 2022-03-18 | Stop reason: HOSPADM

## 2022-03-14 RX ORDER — POLYETHYLENE GLYCOL 3350 17 G/17G
17 POWDER, FOR SOLUTION ORAL DAILY PRN
Status: DISCONTINUED | OUTPATIENT
Start: 2022-03-14 | End: 2022-03-18 | Stop reason: HOSPADM

## 2022-03-14 RX ORDER — ACETAMINOPHEN 325 MG/1
650 TABLET ORAL
Status: DISCONTINUED | OUTPATIENT
Start: 2022-03-14 | End: 2022-03-18 | Stop reason: HOSPADM

## 2022-03-14 RX ADMIN — CALCIUM GLUCONATE 1000 MG: 20 INJECTION, SOLUTION INTRAVENOUS at 21:06

## 2022-03-14 RX ADMIN — HEPARIN SODIUM 5000 UNITS: 5000 INJECTION INTRAVENOUS; SUBCUTANEOUS at 22:51

## 2022-03-14 NOTE — ED TRIAGE NOTES
Pt arrives via [facility] from [address] [facility] at [facility] for unresponsiveness; more responsive upon arrival.

## 2022-03-14 NOTE — ED PROVIDER NOTES
25-year-old gentleman with history of dementia presents by EMS to the emergency department with chief complaint of altered mental status. EMS reports that they were called for patient being unresponsive. They tell me he was unresponsive upon their arrival.  They got into the ambulance and recorded normal vital signs, he then began to wake up. There is no report of shaking or jerking or loss of bowel or bladder. He complains of \"butt pain. \"     The history is provided by the patient, the EMS personnel and medical records. Altered mental status   This is a new problem. The current episode started less than 1 hour ago. The problem has been resolved. Associated symptoms include unresponsiveness. Pertinent negatives include no weakness and no agitation. Mental status baseline: Unclear level of dementia. Risk factors include dementia. His past medical history is significant for dementia. Past Medical History:   Diagnosis Date    Dementia ([facility] [address]        No past surgical history on file. No family history on file. Social History     Socioeconomic History    Marital status:      Spouse name: Not on file    Number of children: Not on file    Years of education: Not on file    Highest education level: Not on file   Occupational History    Not on file   Tobacco Use    Smoking status: Not on file    Smokeless tobacco: Not on file   Substance and Sexual Activity    Alcohol use: Not on file    Drug use: Not on file    Sexual activity: Not on file   Other Topics Concern    Not on file   Social History Narrative    Not on file     Social Determinants of Health     Financial Resource Strain:     Difficulty of Paying Living Expenses: Not on file   Food Insecurity:     Worried About Running Out of Food in the Last Year: Not on file    Mery of Food in the Last Year: Not on file   Transportation Needs:     Lack of Transportation (Medical):  Not on file    Lack of Transportation (Non-Medical): Not on file   Physical Activity:     Days of Exercise per Week: Not on file    Minutes of Exercise per Session: Not on file   Stress:     Feeling of Stress : Not on file   Social Connections:     Frequency of Communication with Friends and Family: Not on file    Frequency of Social Gatherings with Friends and Family: Not on file    Attends [employer] Services: Not on file    Active Member of [address] [place] [address] or Organizations: Not on file    Attends Club or Organization Meetings: Not on file    Marital Status: Not on file   Intimate Partner Violence:     Fear of Current or Ex-Partner: Not on file    Emotionally Abused: Not on file    Physically Abused: Not on file    Sexually Abused: Not on file   Housing Stability:     Unable to Pay for Housing in the Last Year: Not on file    Number of [place] in the Last Year: Not on file    Unstable Housing in the Last Year: Not on file         ALLERGIES: Patient has no known allergies. Review of Systems   Constitutional: Negative for fatigue and fever. HENT: Negative for sneezing and sore throat. Respiratory: Negative for cough and shortness of breath. Cardiovascular: Negative for chest pain and leg swelling. Gastrointestinal: Negative for abdominal pain, diarrhea, nausea and vomiting. Genitourinary: Negative for difficulty urinating and dysuria. Musculoskeletal: Negative for arthralgias and myalgias. Skin: Negative for color change and rash. \"Butt pain\"   Neurological: Negative for weakness and headaches. Psychiatric/Behavioral: Negative for agitation and behavioral problems. There were no vitals filed for this visit. Physical Exam  Vitals and nursing note reviewed. Constitutional:       General: He is not in acute distress. Appearance: Normal appearance. He is well-developed. He is not ill-appearing, toxic-appearing or diaphoretic. HENT:      Head: Normocephalic and atraumatic.       Nose: Nose normal. Mouth/Throat:      Mouth: Mucous membranes are dry. Pharynx: Oropharynx is clear. Eyes:      Extraocular Movements: Extraocular movements intact. Conjunctiva/sclera: Conjunctivae normal.      Pupils: Pupils are equal, round, and reactive to light. Cardiovascular:      Rate and Rhythm: Normal rate and regular rhythm. Pulses: Normal pulses. Heart sounds: No murmur heard. Pulmonary:      Effort: Pulmonary effort is normal. No respiratory distress. Breath sounds: Normal breath sounds. No wheezing. Chest:      Chest wall: No mass or tenderness. Abdominal:      General: There is no distension. Palpations: Abdomen is soft. Tenderness: There is no abdominal tenderness. There is no guarding or rebound. Musculoskeletal:         General: No swelling, tenderness, deformity or signs of injury. Normal range of motion. Cervical back: Normal range of motion and neck supple. No rigidity. No muscular tenderness. Right lower leg: No tenderness. No edema. Left lower leg: No tenderness. No edema. Skin:     General: Skin is warm and dry. Capillary Refill: Capillary refill takes less than 2 seconds. Neurological:      General: No focal deficit present. Mental Status: He is alert. Motor: Tremor present. Comments: Oriented to self and location, tells me he thinks he \"passed out\"   Psychiatric:         Mood and Affect: Mood normal.         Behavior: Behavior normal.          MDM  Number of Diagnoses or Management Options  Diagnosis management comments: 80-year-old male presents as above with altered mental status. She has significant LAMINE on work-up today. Plan to admit to the hospital for further management.        Amount and/or Complexity of Data Reviewed  Clinical lab tests: reviewed  Tests in the radiology section of CPT®: reviewed  Tests in the medicine section of CPT®: reviewed  Decide to obtain previous medical records or to obtain history from someone other than the patient: yes      ED Course as of 03/14/22 1834   Mon Mar 14, 2022   0373 ED EKG interpretation:Rhythm: normal sinus rhythm. Rate (approx.): 88. Axis: Left axis. ST segment:  No concerning ST elevations or depressions. This EKG was interpreted by Brett Dunaway MD,ED Provider. [JM]      ED Course User Index  [JM] Juliet Vance MD       Procedures        Perfect Serve Consult for Admission  7:26 PM    ED Room Number: ER10/10  Patient Name and age:  Adina White 80 y.o.  male  Working Diagnosis:   1. Altered mental status, unspecified altered mental status type    2. LAMINE (acute kidney injury) ([facility] [address]    3.  Acute hyperkalemia        COVID-19 Suspicion:  no  Sepsis present:  no  Reassessment needed: N/A  Code Status:  TBD, patient not able to articulate wishes due to AMS  Readmission: no  Isolation Requirements:  no  Recommended Level of Care:  telemetry  Department:[facility] Adult ED - 21   Other: Patient alert to self, complains of \"butt pain\"

## 2022-03-15 LAB
ALBUMIN SERPL-MCNC: 2.6 G/DL (ref 3.5–5)
ANION GAP SERPL CALC-SCNC: 3 MMOL/L (ref 5–15)
ATRIAL RATE: 88 BPM
BUN SERPL-MCNC: 48 MG/DL (ref 6–20)
BUN/CREAT SERPL: 17 (ref 12–20)
CALCIUM SERPL-MCNC: 9.8 MG/DL (ref 8.5–10.1)
CALCULATED P AXIS, ECG09: 80 DEGREES
CALCULATED R AXIS, ECG10: -40 DEGREES
CALCULATED T AXIS, ECG11: 96 DEGREES
CHLORIDE SERPL-SCNC: 102 MMOL/L (ref 97–108)
CO2 SERPL-SCNC: 30 MMOL/L (ref 21–32)
CREAT SERPL-MCNC: 2.81 MG/DL (ref 0.7–1.3)
DIAGNOSIS, 93000: NORMAL
ERYTHROCYTE [DISTWIDTH] IN BLOOD BY AUTOMATED COUNT: 16.5 % (ref 11.5–14.5)
GLUCOSE SERPL-MCNC: 98 MG/DL (ref 65–100)
HCT VFR BLD AUTO: 32.8 % (ref 36.6–50.3)
HGB BLD-MCNC: 10 G/DL (ref 12.1–17)
MCH RBC QN AUTO: 29.9 PG (ref 26–34)
MCHC RBC AUTO-ENTMCNC: 30.5 G/DL (ref 30–36.5)
MCV RBC AUTO: 98.2 FL (ref 80–99)
NRBC # BLD: 0 K/UL (ref 0–0.01)
NRBC BLD-RTO: 0 PER 100 WBC
P-R INTERVAL, ECG05: 160 MS
PHOSPHATE SERPL-MCNC: 4.2 MG/DL (ref 2.6–4.7)
PLATELET # BLD AUTO: 224 K/UL (ref 150–400)
PMV BLD AUTO: 9.7 FL (ref 8.9–12.9)
POTASSIUM SERPL-SCNC: 4.8 MMOL/L (ref 3.5–5.1)
Q-T INTERVAL, ECG07: 354 MS
QRS DURATION, ECG06: 106 MS
QTC CALCULATION (BEZET), ECG08: 428 MS
RBC # BLD AUTO: 3.34 M/UL (ref 4.1–5.7)
SODIUM SERPL-SCNC: 135 MMOL/L (ref 136–145)
VENTRICULAR RATE, ECG03: 88 BPM
WBC # BLD AUTO: 6.9 K/UL (ref 4.1–11.1)

## 2022-03-15 PROCEDURE — 94760 N-INVAS EAR/PLS OXIMETRY 1: CPT

## 2022-03-15 PROCEDURE — 77010033678 HC OXYGEN DAILY

## 2022-03-15 PROCEDURE — 74011250636 HC RX REV CODE- 250/636: Performed by: FAMILY MEDICINE

## 2022-03-15 PROCEDURE — 99223 1ST HOSP IP/OBS HIGH 75: CPT | Performed by: NURSE PRACTITIONER

## 2022-03-15 PROCEDURE — 80069 RENAL FUNCTION PANEL: CPT

## 2022-03-15 PROCEDURE — 74011250637 HC RX REV CODE- 250/637: Performed by: FAMILY MEDICINE

## 2022-03-15 PROCEDURE — 85027 COMPLETE CBC AUTOMATED: CPT

## 2022-03-15 PROCEDURE — 65660000000 HC RM CCU STEPDOWN

## 2022-03-15 PROCEDURE — 74011000250 HC RX REV CODE- 250: Performed by: FAMILY MEDICINE

## 2022-03-15 PROCEDURE — 36415 COLL VENOUS BLD VENIPUNCTURE: CPT

## 2022-03-15 RX ORDER — SODIUM CHLORIDE 9 MG/ML
75 INJECTION, SOLUTION INTRAVENOUS CONTINUOUS
Status: DISPENSED | OUTPATIENT
Start: 2022-03-15 | End: 2022-03-16

## 2022-03-15 RX ADMIN — HEPARIN SODIUM 5000 UNITS: 5000 INJECTION INTRAVENOUS; SUBCUTANEOUS at 21:30

## 2022-03-15 RX ADMIN — SODIUM CHLORIDE, PRESERVATIVE FREE 10 ML: 5 INJECTION INTRAVENOUS at 13:47

## 2022-03-15 RX ADMIN — SODIUM CHLORIDE, PRESERVATIVE FREE 10 ML: 5 INJECTION INTRAVENOUS at 03:23

## 2022-03-15 RX ADMIN — SODIUM CHLORIDE 75 ML/HR: 9 INJECTION, SOLUTION INTRAVENOUS at 03:21

## 2022-03-15 RX ADMIN — SODIUM CHLORIDE, PRESERVATIVE FREE 10 ML: 5 INJECTION INTRAVENOUS at 21:30

## 2022-03-15 RX ADMIN — ACETAMINOPHEN 650 MG: 325 TABLET ORAL at 13:46

## 2022-03-15 RX ADMIN — HEPARIN SODIUM 5000 UNITS: 5000 INJECTION INTRAVENOUS; SUBCUTANEOUS at 05:45

## 2022-03-15 RX ADMIN — SODIUM CHLORIDE, PRESERVATIVE FREE 10 ML: 5 INJECTION INTRAVENOUS at 05:47

## 2022-03-15 RX ADMIN — HEPARIN SODIUM 5000 UNITS: 5000 INJECTION INTRAVENOUS; SUBCUTANEOUS at 13:46

## 2022-03-15 NOTE — CONSULTS
Palliative Medicine Consult  [place]: [phone] ([phone])    Patient Name: Jorge Perales  YOB: 1931    Date of Initial Consult: March 15, 2022  Reason for Consult: Care Decisions  Requesting Provider: Dr. Emilie Burrell  Primary Care Physician: Elma Wallace MD     SUMMARY:   Jorge Perales is a 80 y.o. with a past history of HTN, DM2, DLD, dysphagia, dementia, frequent falls, FTT, who was admitted on 3/14/2022 from [place] [address] due to unresponsiveness with a diagnosis of AMS, LAMINE, hyperkalemia. Pt now more responsive and following commands. Current medical issues leading to Palliative Medicine involvement include: support with care decisions. Social: pt is  and has 2 sons. Moved to the [facility] about a year ago due to increasing care needs. He is a vet with full benefits. Usually cared for at the [place] per wife. PALLIATIVE DIAGNOSES:   1. Palliative Care Encounter  2. AMS~ improved  3. hypoalbumienmia  4. Poor appetite~patient attributes this to food choices at the facility  5. Physical debility      PLAN:   1. Patient seen with nurse present at the bedside. Patient oriented to person, place. Twyla Miranda He does not recall coming to the hospital or why he is here. I reviewed his condition and updated him. He does not demonstrate full capacity for complex decisions. 2. Spoke with wife about our services. Inquired about care goals and patient wishes  3. Wife says he is well cared for at the facility. [facility] healthcare needs. I assured her that we are taking good care of him here. 4. Goals identified~full code, continue current level of care, hospitalizations for acute issues as they arise. 5. Discussed patient's frailty and concerns if he were to have a sudden event like cardiac arrest.  Recommended DO NOT RESUSCITATE. Wife verbalized an understanding. But did not make any changes today. 6. We will follow along. Support as helpful  7.  Initial consult note routed to primary continuity provider and/or primary health care team members  8. Communicated plan of care with: Palliative IDT, [facility] Team     GOALS OF CARE / TREATMENT PREFERENCES:     GOALS OF CARE:  Patient/Health Care Proxy Stated Goals: Cure (address acute issues)    TREATMENT PREFERENCES:   Code Status: Full Code    Advance Care Planning:  [] [facility] Interdisciplinary Team has updated the ACP Navigator with Health Care Decision Maker and Patient Capacity      Primary Decision Maker (Active): Calvin Rubio - Spouse - [phone]  Advance Care Planning 3/14/2022   Confirm Advance Directive Yes, not on file       Medical Interventions: Full interventions       Other:    As far as possible, the palliative care team has discussed with patient / health care proxy about goals of care / treatment preferences for patient. HISTORY:     History obtained from: Chart, wife, team    CHIEF COMPLAINT: Patient admitted with hyperkalemia and LAMINE    HPI/SUBJECTIVE:    The patient is:   [x] Verbal and participatory but limited due to condition  [] Non-participatory due to:    No complaints    Clinical Pain Assessment (nonverbal scale for severity on nonverbal patients):   Clinical Pain Assessment  Severity: 0          Duration: for how long has pt been experiencing pain (e.g., 2 days, 1 month, years)  Frequency: how often pain is an issue (e.g., several times per day, once every few days, constant)     FUNCTIONAL ASSESSMENT:     Palliative Performance Scale (PPS):  PPS: 40       PSYCHOSOCIAL/SPIRITUAL SCREENING:     Palliative IDT has assessed this patient for cultural preferences / practices and a referral made as appropriate to needs (Cultural Services, Patient Advocacy, Ethics, etc.)    Any spiritual / Mandaen concerns:  [] Yes /  [x] No   If \"Yes\" to discuss with pastoral care during IDT     Does caregiver feel burdened by caring for their loved one:   [] Yes /  [x] No /  [] No Caregiver Present    If \"Yes\" to discuss with social work during IDT    Anticipatory grief assessment:   [x] Normal  / [] Maladaptive     If \"Maladaptive\" to discuss with social work during IDT    ESAS Anxiety: Anxiety: 0    ESAS Depression: Depression: 0        REVIEW OF SYSTEMS:     Positive and pertinent negative findings in ROS are noted above in HPI. The following systems were [x] reviewed / [] unable to be reviewed as noted in HPI  Other findings are noted below. Systems: constitutional, ears/nose/mouth/throat, respiratory, gastrointestinal, genitourinary, musculoskeletal, integumentary, neurologic, psychiatric, endocrine. Positive findings noted below. Modified ESAS Completed by: provider   Fatigue: 2 Drowsiness: 0   Depression: 0 Pain: 0   Anxiety: 0 Nausea: 0     Dyspnea: 0                    PHYSICAL EXAM:     From RN flowsheet:  Wt Readings from Last 3 Encounters:   03/15/22 180 lb 8.9 oz (81.9 kg)   01/05/22 202 lb 13.2 oz (92 kg)   12/21/21 200 lb (90.7 kg)     Blood pressure 118/79, pulse 78, temperature 98.7 °F (37.1 °C), resp. rate 13, height 5' 9\" (1.753 m), weight 180 lb 8.9 oz (81.9 kg), SpO2 97 %. Pain Scale 1: PAINAD (Advanced Dementia)  Pain Intensity 1: 0              Pain Intervention(s) 1: Medication (see MAR)  Last bowel movement, if known:     Constitutional: Alert, pleasant, NAD  Eyes: pupils equal, anicteric  ENMT: no nasal discharge, moist mucous membranes  Cardiovascular: Anasarca  Respiratory: breathing not labored, symmetric  Gastrointestinal: soft non-tender  Musculoskeletal: Arthritic deformity, no tenderness to palpation  Skin: warm, dry  Neurologic: following commands, moving all extremities  Psychiatric: full affect, no hallucinations  Other:       HISTORY:     Active Problems:    Hyperkalemia (3/14/2022)      Past Medical History:   Diagnosis Date    Dementia ([facility] [address]       No past surgical history on file. No family history on file.    History reviewed, no pertinent family history. Social History     Tobacco Use    Smoking status: Not on file    Smokeless tobacco: Not on file   Substance Use Topics    Alcohol use: Not on file     No Known Allergies   Current Facility-Administered Medications   Medication Dose Route Frequency    0.9% sodium chloride infusion  75 mL/hr IntraVENous CONTINUOUS    sodium chloride (NS) flush 5-40 mL  5-40 mL IntraVENous Q8H    sodium chloride (NS) flush 5-40 mL  5-40 mL IntraVENous PRN    acetaminophen (TYLENOL) tablet 650 mg  650 mg Oral Q6H PRN    Or    acetaminophen (TYLENOL) suppository 650 mg  650 mg Rectal Q6H PRN    polyethylene glycol (MIRALAX) packet 17 g  17 g Oral DAILY PRN    ondansetron (ZOFRAN ODT) tablet 4 mg  4 mg Oral Q8H PRN    Or    ondansetron (ZOFRAN) injection 4 mg  4 mg IntraVENous Q6H PRN    heparin (porcine) injection 5,000 Units  5,000 Units SubCUTAneous Q8H          LAB AND IMAGING FINDINGS:     Lab Results   Component Value Date/Time    WBC 6.9 03/15/2022 12:08 AM    HGB 10.0 (L) 03/15/2022 12:08 AM    PLATELET 914 98/77/4226 12:08 AM     Lab Results   Component Value Date/Time    Sodium 135 (L) 03/15/2022 12:08 AM    Potassium 4.8 03/15/2022 12:08 AM    Chloride 102 03/15/2022 12:08 AM    CO2 30 03/15/2022 12:08 AM    BUN 48 (H) 03/15/2022 12:08 AM    Creatinine 2.81 (H) 03/15/2022 12:08 AM    Calcium 9.8 03/15/2022 12:08 AM    Phosphorus 4.2 03/15/2022 12:08 AM      Lab Results   Component Value Date/Time    Alk.  phosphatase 56 03/14/2022 05:06 PM    Protein, total 7.2 03/14/2022 05:06 PM    Albumin 2.6 (L) 03/15/2022 12:08 AM    Globulin 4.4 (H) 03/14/2022 05:06 PM     Lab Results   Component Value Date/Time    INR 1.0 01/04/2022 04:13 PM    Prothrombin time 10.5 01/04/2022 04:13 PM    aPTT 24.2 01/04/2022 04:13 PM      No results found for: IRON, FE, TIBC, IBCT, PSAT, FERR   No results found for: PH, PCO2, PO2  No components found for: GLPOC   No results found for: CPK, CKMB              Total time: 70 minutes  Counseling / coordination time, spent as noted above: 60 minutes  > 50% counseling / coordination?:  Yes    Prolonged service was provided for  []30 min   []75 min in face to face time in the presence of the patient, spent as noted above. Time Start:   Time End:   Note: this can only be billed with 08048 (initial) or 49012 (follow up). If multiple start / stop times, list each separately.

## 2022-03-15 NOTE — PROGRESS NOTES
Tried several times to get urine sample on patient. Patient keeps pulling off condom cath and male purewik. Patient is incontinent so cannot get from urinal. Will continue to try.

## 2022-03-15 NOTE — PROGRESS NOTES
TRANSFER - IN REPORT:    Verbal report received from [address] (name) on Nickolas Valiente  being received from ED (unit) for routine progression of care      Report consisted of patients Situation, Background, Assessment and   Recommendations(SBAR). Information from the following report(s) SBAR, ED Summary, Intake/Output, Recent Results and Cardiac Rhythm NSR; PVCs was reviewed with the receiving nurse. Opportunity for questions and clarification was provided. Assessment completed upon patients arrival to unit and care assumed.

## 2022-03-15 NOTE — PROGRESS NOTES
Pt admitted by Dr. Ritesh Messina, LAMINE and FTT  Seen and examined  - Continue IVF, recheck BMP in am   - Rest per Dr. Ritesh Messina

## 2022-03-15 NOTE — PROGRESS NOTES
2348: Patient arrived to the unit. VS stable. Oriented to self and place. Bath and incontinence care completed. Unable to chart PTA med list, patient is unable to recall meds and a list was not brought from facility. [id number] Sousa to Mike Salazar MD. MD wants to place a condom external catheter for strict I&Os.

## 2022-03-15 NOTE — H&P
[facility] Adult  Hospitalist Group  History and Physical    Date of Service:  3/15/2022  Primary Care Provider: Marlena Ortiz MD  Source of information: Chart review    Chief Complaint: Altered mental status      History of Presenting Illness:   Daniela Jesus is a 80 y.o. male with a pmhx dementia, HTN, DM II, dyslipidemia, dysphagia, frequent falls, and FTT who is being admitted for LAMINE with hyperkalemia. When asking patient about ROS, he only states that he is cold and would like more blankets, and that he doesn't know why he is in the hospital.  He denies chest pian, dyspnea, or palpia    In the ED, VSS. Labs are significant for Na 133, BUN 46, and creatinine 3.13. Initial labs showed K+ of 6.1, but the sample was hemolyzed. There was a delay in obtaining a  Repeat K+ level. EKG and telemetry showed frequent PVC's. REVIEW OF SYSTEMS:  All other review of systems were negative except for that written in the History of Present Illness. Past Medical History:   Diagnosis Date    Dementia ([address]       No past surgical history on file. Prior to Admission medications    Not on File     No Known Allergies   No family history on file. Social History:       Family and social history were personally reviewed, all pertinent and relevant details are outlined as above.     Objective:     Visit Vitals  /69   Pulse 85   Temp 98.8 °F (37.1 °C)   Resp 18   Wt 81 kg (178 lb 9.2 oz)   SpO2 98%   BMI 26.37 kg/m²    O2 Flow Rate (L/min): 1 l/min O2 Device: Nasal cannula    PHYSICAL EXAM:   General: Alert x oriented to person only awake, no acute distress, resting in bed, pleasant male, appears to be stated age  HEENT: PEERL, EOMI, moist mucus membranes  Neck: Supple, no JVD, no meningeal signs  Chest: Clear to auscultation bilaterally   CVS: RRR with frequent extrasystoles, S1 S2 heard, no murmurs/rubs/gallops  Abd: Soft, non-tender, non-distended, +bowel sounds   Ext: No clubbing, no cyanosis, trace b/l LE pitting edema  Neuro/Psych: Pleasant mood and affect, CN 2-12 grossly intact, sensory grossly within normal limit, Strength 5/5 in all extremities  Cap refill: Brisk, less than 3 seconds  Pulses: 2+ radial pules  Skin: b/l le pitting edema with h    Data Review: All diagnostic labs and studies have been reviewed. Abnormal Labs Reviewed   CBC WITH AUTOMATED DIFF - Abnormal; Notable for the following components:       Result Value    RBC 3.58 (*)     HGB 10.6 (*)     HCT 35.1 (*)     RDW 16.6 (*)     IMMATURE GRANULOCYTES 1 (*)     ABS. IMM. GRANS. 0.1 (*)     All other components within normal limits   METABOLIC PANEL, COMPREHENSIVE - Abnormal; Notable for the following components:    Sodium 133 (*)     Potassium 6.1 (*)     Glucose 146 (*)     BUN 46 (*)     Creatinine 3.13 (*)     GFR est AA 23 (*)     GFR est non-AA 19 (*)     Albumin 2.8 (*)     Globulin 4.4 (*)     A-G Ratio 0.6 (*)     All other components within normal limits   RENAL FUNCTION PANEL - Abnormal; Notable for the following components:    Sodium 135 (*)     Anion gap 3 (*)     BUN 48 (*)     Creatinine 2.81 (*)     GFR est AA 26 (*)     GFR est non-AA 21 (*)     Albumin 2.6 (*)     All other components within normal limits   CBC W/O DIFF - Abnormal; Notable for the following components:    RBC 3.34 (*)     HGB 10.0 (*)     HCT 32.8 (*)     RDW 16.5 (*)     All other components within normal limits       All Micro Results     Procedure Component Value Units Date/Time    URINE CULTURE HOLD SAMPLE [[id number]]     Order Status: Sent Specimen: Urine           IMAGING:   US RETROPERITONEUM COMP   Final Result      Limited examination. No evidence of hydronephrosis. The left kidney is poorly visualized. Aneurysmal dilatation of the abdominal aorta is suggested measuring up to 5.6 cm   in size. Possible aortic dissection is suggested.    CTA of the abdomen when clinically feasible for further delineation when/if clinically feasible is recommended. CT HEAD WO CONT   Final Result   No acute abnormality            XR CHEST PORT   Final Result      No acute findings. ECG/ECHO:    Results for orders placed or performed during the hospital encounter of 03/14/22   EKG, 12 LEAD, INITIAL   Result Value Ref Range    Ventricular Rate 88 BPM    Atrial Rate 88 BPM    P-R Interval 160 ms    QRS Duration 106 ms    Q-T Interval 354 ms    QTC Calculation (Bezet) 428 ms    Calculated P Axis 80 degrees    Calculated R Axis -40 degrees    Calculated T Axis 96 degrees    Diagnosis       Sinus rhythm with frequent premature ventricular complexes  Left axis deviation  Anterior infarct , age undetermined  T wave abnormality, consider lateral ischemia  Abnormal ECG  When compared with ECG of 04-JAN-2022 17:15,  premature ventricular complexes are now present  Incomplete left bundle branch block is no longer present  Inverted T waves have replaced nonspecific T wave abnormality in Lateral   leads          Assessment:   Given the patient's current clinical presentation, there is a high level of concern for decompensation if discharged from the emergency department. Complex decision making was performed, which includes reviewing the patient's available past medical records, laboratory results, and imaging studies. Active Problems:    Hyperkalemia (3/14/2022)      Plan:     #LAMINE  -patient states that he has decreased PO intake  -clnically dry  -IVF  -check urine studies, and renal ultrasu  -trend renal function  -Avoid renal toxins, and hypotension. Renally dose meds. #Possible Hyperkalemia  -initial K+ 6.1 in a hemolyzed sample.   Since patient is also having frequent ventricular ectopy, I will give calcium gluconate, and have ordered stat repeat in potassium due to the current sample being hemolyzed  -repeat K+ within range at 4.3    #HTN  #dyslipidemia  -hold lisinopril    #Dementia  -consult case management for dispo plans back to the [facility]    DIET: ADULT DIET Regular; 4 carb choices (60 gm/meal); Low Fat/Low Chol/High Fiber/2 gm Na; Low Potassium (Less than 3000 mg/day); Low Phosphorus (Less than 1000 mg)   ISOLATION PRECAUTIONS: There are currently no Active Isolations  CODE STATUS: Full Code   DVT PROPHYLAXIS: Heparin  ANTICIPATED DISCHARGE: 24-48 hours. EMERGENCY CONTACT/SURROGATE DECISION MAKER: Edwige Dimas ([phone])    Signed By: Rhonda Quigley MD     March 15, 2022         Please note that this dictation may have been completed with Dragon, the walkby voice recognition software. Quite often unanticipated grammatical, syntax, homophones, and other interpretive errors are inadvertently transcribed by the computer software. Please disregard these errors. Please excuse any errors that have escaped final proofreading.

## 2022-03-15 NOTE — PROGRESS NOTES
Comprehensive Nutrition Assessment    Type and Reason for Visit: Initial,Positive nutrition screen    Nutrition Recommendations/Plan:      1. Will liberalize diet order to Soft and Bite Sized with low K+    2. Will order Ensure Enlive (high calorie, high protein) 1x/d      Nutrition Assessment:    81 yo male admitted for hyperkalemia. PMHx: dementia, HTN, DM, dyslipidemia, dysphagia, FTT.  MST received for weight loss. Weight hx in EMR indicates weight loss of 11% over last 2 months. Discussed this with pt, he states it was intentional because he thought he \"was too fat\" and now likes the weight he is at. Diet ordered per previous SLP rec's for soft and bite sized with thin liquids. Pt states he did not get any breakfast today. Follow up with RN regarding this, she states he refused his breakfast and was very agitated this morning. Pt reports good appetite at home. States he has had Ensure before and likes it okay, prefers chocolate flavor. Labs: BUN/cr elevated, K+ now WNL      Malnutrition Assessment:  Malnutrition Status:  No malnutrition      Nutritionally Significant Medications: reviewed; NaCl at 75 ml/hr    Estimated Daily Nutrient Needs:  Energy (kcal): 1900 (MSJ x AF 1.3); Weight Used for Energy Requirements: Current  Protein (g): 98 (1.2 gm/kg); Weight Used for Protein Requirements: Current  Fluid (ml/day): 1900; Method Used for Fluid Requirements: 1 ml/kcal    Nutrition Related Findings:       BM: 3/14  Edema: trace BLE  Wounds:  None       Current Nutrition Therapies:   Diet: Soft and bite sized, 4 Carb choices, Low Fat/Low Chol/2gm Na+, low K+, low phos  Supplements: none  Additional Caloric Sources: none    Meal intake: No data found. Supplement Intake: No data found.     Anthropometric Measures:  · Height:  5' 9\" (175.3 cm)  · Current Body Wt:  81.9 kg (180 lb 8.9 oz)   · Admission Body Wt:       · Usual Body Wt:        · Ideal Body Wt:  160:  112.8 %   · Adjusted Body Weight:   ; Weight Adjustment for: No adjustment   · Adjusted BMI:       · BMI Categories:  Overweight (BMI 25.0-29. 9)     Wt Readings from Last 10 Encounters:   03/15/22 81.9 kg (180 lb 8.9 oz)   01/05/22 92 kg (202 lb 13.2 oz)   12/21/21 90.7 kg (200 lb)       Nutrition Diagnosis:   · Biting/chewing (masticatory) difficulty related to biting/chewing (masticatory) difficulty,partial or complete edentulism as evidenced by other (specify) (need for soft and bite sized diet)      Nutrition Interventions:   Food and/or Nutrient Delivery: Modify current diet,Start oral nutrition supplement  Nutrition Education and Counseling: No recommendations at this time  Coordination of Nutrition Care: Continue to monitor while inpatient    Goals:  PO intakes > 75% meals with no difficulty chewing within next 5-7 days       Nutrition Monitoring and Evaluation:   Behavioral-Environmental Outcomes: None identified  Food/Nutrient Intake Outcomes: Food and nutrient intake,Supplement intake  Physical Signs/Symptoms Outcomes: Biochemical data,GI status,Weight    Discharge Planning:    Continue current diet     Renetta Pathak RD  Contact via [employer]

## 2022-03-15 NOTE — PROGRESS NOTES
Reason for Admission:    Altered Mental Status,                   RUR Score:     19% Moderate             PCP: First and Last name:   Marlena Ortiz MD     Name of Practice:    Are you a current patient: Yes/No:    Approximate date of last visit:    Can you participate in a virtual visit if needed:   *Patient is seen by MD at [facility]*  Do you (patient/family) have any concerns for transition/discharge? NA              Plan for utilizing home health:   NA    Current Advanced Directive/Advance Care Plan:  Full Code      Healthcare Decision Maker:   Click here to complete HealthCare Decision Makers including selection of the Healthcare Decision Maker Relationship (ie \"Primary\")            Primary Decision Maker (Active): Ramonita Carty - Spouse - [phone]    Transition of Care Plan:      CM attempted to meet with patient but he was asleep. CM contacted wife at [phone]. Patient is in [address] at The [address] [place] [address]-return referral placed in [facility]. Facility requesting rapid COVID test prior to his return. Per wife, patient is normally ambulatory with walker and electric scooter. Plan is for patient to return to facility via BLS. CM to monitor. Medicare pt has received, reviewed, and signed 1st IM letter informing them of their right to appeal the discharge. Signed copy has been placed on pt bedside chart.     *reviewed with wife over the phone*    Jaqui Morton MS

## 2022-03-15 NOTE — PROGRESS NOTES
Occupational Therapy Screening:  Services maybe indicated at this time. [facility] based on results obtained during the nursing admission assessment. The patients chart was reviewed . Please order a consult for occupational therapy if patient has had a decline in function from baseline and you would like an evaluation to be completed. Thank you.

## 2022-03-15 NOTE — PROGRESS NOTES
Bedside shift change report given to Melony Watson RN (oncoming nurse) by Ling Reed RN (offgoing nurse). Report included the following information SBAR, Kardex, Intake/Output, MAR and Cardiac Rhythm NSR.

## 2022-03-15 NOTE — WOUND CARE
WOCN Note:     New wound care consult placed for buttock wounds  Assessed in room 643  Isolation: no    Chart shows:  Patient admitted on 3/14/22 with hyperkalemia  Past Medical History:   Diagnosis Date    Dementia [facility])      Assessment:   Combative, scratching and hitting staff. Yelling, using foul language    Mobility: total assistance with repositioning and turning  Continence: Incontinent of urine and stool. Wearing briefs, condom cath   Restraints: no  Last Jonathon Score: 13  Surface: P500 mattress  Diet: Dysphagia, 4 carb choice    Wt Readings from Last 1 Encounters:   03/15/22 81.9 kg (180 lb 8.9 oz)     Bilateral heel, buttocks, and sacral skin intact and without erythema     Healed wound to sacrum, scarring    Heels offloaded with pillows     PI Prevention:  Turn/reposition approximately every 2 hours  Offload heels with pillows at all times in bed. Sacral Foam dressing: lift to assess regularly; change as needed. Discontinue if incontinent. Hydraguard Barrier Cream  to buttocks and sacrum TID  and as needed with incontinence care   Keep HOB 30 degrees or less to decrease shearing and pressure unless medically contraindicated. If HOB is to be over 30 degrees, raise knees first then [facility] to prevent sliding   Minimize layers of linen/pads under patient to optimize support surface to one sheet and one incontinence pad     Discussed with RN, Ethan Headley    Transition of Care: No wound care needs at this time. Wound care will sign off.  Please re-consult as needed    Kristy Bryant MSN, RN, [facility]  Certified Wound and Ostomy Nurse  office [phone]  Can be reached through [employer]

## 2022-03-16 LAB
ANION GAP SERPL CALC-SCNC: 3 MMOL/L (ref 5–15)
BASOPHILS # BLD: 0.1 K/UL (ref 0–0.1)
BASOPHILS NFR BLD: 1 % (ref 0–1)
BUN SERPL-MCNC: 35 MG/DL (ref 6–20)
BUN/CREAT SERPL: 18 (ref 12–20)
CALCIUM SERPL-MCNC: 9.3 MG/DL (ref 8.5–10.1)
CHLORIDE SERPL-SCNC: 107 MMOL/L (ref 97–108)
CO2 SERPL-SCNC: 28 MMOL/L (ref 21–32)
CREAT SERPL-MCNC: 1.99 MG/DL (ref 0.7–1.3)
DIFFERENTIAL METHOD BLD: ABNORMAL
EOSINOPHIL # BLD: 0.3 K/UL (ref 0–0.4)
EOSINOPHIL NFR BLD: 7 % (ref 0–7)
ERYTHROCYTE [DISTWIDTH] IN BLOOD BY AUTOMATED COUNT: 16.2 % (ref 11.5–14.5)
GLUCOSE SERPL-MCNC: 76 MG/DL (ref 65–100)
HCT VFR BLD AUTO: 32.9 % (ref 36.6–50.3)
HGB BLD-MCNC: 10 G/DL (ref 12.1–17)
IMM GRANULOCYTES # BLD AUTO: 0 K/UL (ref 0–0.04)
IMM GRANULOCYTES NFR BLD AUTO: 0 % (ref 0–0.5)
LYMPHOCYTES # BLD: 1.5 K/UL (ref 0.8–3.5)
LYMPHOCYTES NFR BLD: 33 % (ref 12–49)
MAGNESIUM SERPL-MCNC: 2.1 MG/DL (ref 1.6–2.4)
MCH RBC QN AUTO: 29.7 PG (ref 26–34)
MCHC RBC AUTO-ENTMCNC: 30.4 G/DL (ref 30–36.5)
MCV RBC AUTO: 97.6 FL (ref 80–99)
MONOCYTES # BLD: 0.5 K/UL (ref 0–1)
MONOCYTES NFR BLD: 12 % (ref 5–13)
NEUTS SEG # BLD: 2.1 K/UL (ref 1.8–8)
NEUTS SEG NFR BLD: 47 % (ref 32–75)
NRBC # BLD: 0 K/UL (ref 0–0.01)
NRBC BLD-RTO: 0 PER 100 WBC
PHOSPHATE SERPL-MCNC: 2.7 MG/DL (ref 2.6–4.7)
PLATELET # BLD AUTO: 224 K/UL (ref 150–400)
PMV BLD AUTO: 9.5 FL (ref 8.9–12.9)
POTASSIUM SERPL-SCNC: 4.3 MMOL/L (ref 3.5–5.1)
RBC # BLD AUTO: 3.37 M/UL (ref 4.1–5.7)
SODIUM SERPL-SCNC: 138 MMOL/L (ref 136–145)
WBC # BLD AUTO: 4.5 K/UL (ref 4.1–11.1)

## 2022-03-16 PROCEDURE — 85025 COMPLETE CBC W/AUTO DIFF WBC: CPT

## 2022-03-16 PROCEDURE — 80048 BASIC METABOLIC PNL TOTAL CA: CPT

## 2022-03-16 PROCEDURE — 74011250636 HC RX REV CODE- 250/636: Performed by: INTERNAL MEDICINE

## 2022-03-16 PROCEDURE — 65660000000 HC RM CCU STEPDOWN

## 2022-03-16 PROCEDURE — 36415 COLL VENOUS BLD VENIPUNCTURE: CPT

## 2022-03-16 PROCEDURE — 84100 ASSAY OF PHOSPHORUS: CPT

## 2022-03-16 PROCEDURE — 94760 N-INVAS EAR/PLS OXIMETRY 1: CPT

## 2022-03-16 PROCEDURE — 74011000250 HC RX REV CODE- 250: Performed by: FAMILY MEDICINE

## 2022-03-16 PROCEDURE — 74011250636 HC RX REV CODE- 250/636: Performed by: FAMILY MEDICINE

## 2022-03-16 PROCEDURE — 83735 ASSAY OF MAGNESIUM: CPT

## 2022-03-16 RX ORDER — GUAIFENESIN 100 MG/5ML
81 LIQUID (ML) ORAL DAILY
COMMUNITY

## 2022-03-16 RX ORDER — EZETIMIBE 10 MG/1
0.5 TABLET ORAL DAILY
COMMUNITY

## 2022-03-16 RX ORDER — OMEPRAZOLE 20 MG/1
20 CAPSULE, DELAYED RELEASE ORAL DAILY
COMMUNITY

## 2022-03-16 RX ORDER — POLYVINYL ALCOHOL 14 MG/ML
1 SOLUTION/ DROPS OPHTHALMIC AS NEEDED
COMMUNITY

## 2022-03-16 RX ORDER — ONDANSETRON 4 MG/1
4 TABLET, ORALLY DISINTEGRATING ORAL
COMMUNITY
End: 2022-05-18

## 2022-03-16 RX ORDER — OXYCODONE AND ACETAMINOPHEN 7.5; 325 MG/1; MG/1
TABLET ORAL
COMMUNITY
End: 2022-03-18

## 2022-03-16 RX ORDER — ASCORBIC ACID 500 MG
500 TABLET ORAL DAILY
COMMUNITY
End: 2022-05-18

## 2022-03-16 RX ORDER — ISOSORBIDE MONONITRATE 60 MG/1
0.5 TABLET, EXTENDED RELEASE ORAL
COMMUNITY
End: 2022-03-18

## 2022-03-16 RX ORDER — TIZANIDINE HYDROCHLORIDE 4 MG/1
4 CAPSULE, GELATIN COATED ORAL
COMMUNITY
End: 2022-03-18

## 2022-03-16 RX ORDER — MELATONIN
4000 DAILY
COMMUNITY

## 2022-03-16 RX ORDER — SODIUM CHLORIDE 9 MG/ML
75 INJECTION, SOLUTION INTRAVENOUS CONTINUOUS
Status: DISCONTINUED | OUTPATIENT
Start: 2022-03-16 | End: 2022-03-18 | Stop reason: HOSPADM

## 2022-03-16 RX ORDER — AMLODIPINE BESYLATE 10 MG/1
10 TABLET ORAL DAILY
COMMUNITY
End: 2022-03-18

## 2022-03-16 RX ORDER — MIRTAZAPINE 7.5 MG/1
7.5 TABLET, FILM COATED ORAL
COMMUNITY

## 2022-03-16 RX ORDER — CALCIUM CARBONATE 500(1250)
TABLET ORAL DAILY
COMMUNITY

## 2022-03-16 RX ORDER — FLUTICASONE PROPIONATE 50 MCG
2 SPRAY, SUSPENSION (ML) NASAL DAILY
COMMUNITY

## 2022-03-16 RX ORDER — FUROSEMIDE 40 MG/1
40 TABLET ORAL 2 TIMES DAILY
COMMUNITY
End: 2022-03-18

## 2022-03-16 RX ORDER — GABAPENTIN 300 MG/1
300 CAPSULE ORAL 2 TIMES DAILY
COMMUNITY
End: 2022-03-18

## 2022-03-16 RX ORDER — LANOLIN ALCOHOL/MO/W.PET/CERES
CREAM (GRAM) TOPICAL 2 TIMES DAILY
COMMUNITY

## 2022-03-16 RX ORDER — LISINOPRIL 10 MG/1
5 TABLET ORAL DAILY
COMMUNITY
End: 2022-03-18

## 2022-03-16 RX ADMIN — HEPARIN SODIUM 5000 UNITS: 5000 INJECTION INTRAVENOUS; SUBCUTANEOUS at 14:00

## 2022-03-16 RX ADMIN — SODIUM CHLORIDE, PRESERVATIVE FREE 10 ML: 5 INJECTION INTRAVENOUS at 18:46

## 2022-03-16 RX ADMIN — SODIUM CHLORIDE, PRESERVATIVE FREE 10 ML: 5 INJECTION INTRAVENOUS at 21:54

## 2022-03-16 RX ADMIN — SODIUM CHLORIDE 75 ML/HR: 9 INJECTION, SOLUTION INTRAVENOUS at 18:46

## 2022-03-16 RX ADMIN — HEPARIN SODIUM 5000 UNITS: 5000 INJECTION INTRAVENOUS; SUBCUTANEOUS at 05:33

## 2022-03-16 RX ADMIN — HEPARIN SODIUM 5000 UNITS: 5000 INJECTION INTRAVENOUS; SUBCUTANEOUS at 21:53

## 2022-03-16 NOTE — PROGRESS NOTES
Bedside shift change report given to OBED Law (oncoming nurse) by Raffy Rizo RN (offgoing nurse). Report included the following information SBAR, Kardex, Intake/Output, MAR, Recent Results and Cardiac Rhythm Sinus with a BBB and PVCs.

## 2022-03-16 NOTE — PROGRESS NOTES
Spiritual Care Assessment/Progress Note  [facility]      NAME: Lia Flowers      MRN: [id number]  AGE: 80 y.o. SEX: male  Church Affiliation: Scientologist   Language: English     3/16/2022     Total Time (in minutes): 7     Spiritual Assessment begun in [address] [facility] through conversation with:         [x]Patient        [] Family    [] Friend(s)        Reason for Consult: Palliative Care, Initial/Spiritual Assessment     Spiritual beliefs: (Please include comment if needed)     [] Identifies with a navi tradition:         [] Supported by a navi community:            [] Claims no spiritual orientation:           [] Seeking spiritual identity:                [] Adheres to an individual form of spirituality:           [x] Not able to assess:                           Identified resources for coping:      [] Prayer                               [] Music                  [] Guided Imagery     [] Family/friends                 [] Pet visits     [] Devotional reading                         [x] Unknown     [] Other:                                              Interventions offered during this visit: (See comments for more details)    Patient Interventions: Initial/Spiritual assessment, patient floor           Plan of Care:     [] Support spiritual and/or cultural needs    [] Support AMD and/or advance care planning process      [] Support grieving process   [] Coordinate Rites and/or Rituals    [] Coordination with community clergy   [] No spiritual needs identified at this time   [] Detailed Plan of Care below (See Comments)  [] Make referral to Music Therapy  [] Make referral to Pet Therapy     [] Make referral to Addiction services  [] Make referral to [facility]  [] Make referral to Spiritual Care Partner  [] No future visits requested        [x] Contact Spiritual Care for further referrals     Comments: Attempted to offer supportive visit for pt in [address] [address]   Reviewed pt's chart prior to this visit. Pt expressed no particular support needs and declined visit at this time. Affirmed ongoing availability of support. El Walsh MDiv.  Staff   Request  Support/Spiritual Care Services on [phone])

## 2022-03-16 NOTE — PROGRESS NOTES
Physician Progress Note      PATIENTOley Bosworth  CSN #:                  [id number]  :                       1931  ADMIT DATE:       3/14/2022 4:44 PM  [address] [place] DATE:  RESPONDING  PROVIDER #:        Idalia Lawrence MD          QUERY TEXT:    Patient admitted and noted to have \"possible hyperkalemia\" documented in the H&P. Potassium on initial draw was 6.1 and noted to be hemolyzed. Patient was also noted to be experiencing frequent ectopy on the monitor. Calcium gluconate was ordered and given at . Potassium on redraw, after the medication was administered was 4.8. It is noted in the Event log that the redrawn specimen was taken at [address] After further study is it possible to state that the diagnosis of hyperkalemia    The medical record reflects the following:  Risk Factors: 89M pmhx dementia being treated for LAMINE, documented to be clinically dry in the H&P  Clinical Indicators:    3/14 1826 EKG  Sinus rhythm with frequent premature ventricular complexes    3/14 1706 K+ 6.1  Comment: SPECIMEN HEMOLYZED, RESULTS MAY BE AFFECTED    3/14 calcium gluconate 1g IV once over 60 min start 3/14 2011 stop 3/14 2206    3/15 0008 K+ 4.8    Kyung Amaral MD H&P  Initial labs showed K+ of 6.1, but the sample was hemolyzed. There was a delay in obtaining a  Repeat K+ level. EKG and telemetry showed frequent PVC's. Possible Hyperkalemia  -initial K+ 6.1 in a hemolyzed sample.   Since patient is also having frequent ventricular ectopy, I will give calcium gluconate, and have ordered stat repeat in potassium due to the current sample being hemolyzed  -repeat K+ within range at 4.3  LAMINE  -patient states that he has decreased PO intake  -clinically dry    Treatment: Labs and daily monitoring, calcium gluconate 1g IV once over 60 min start 3/14 2011 stop 3/14 2206, cardiac telemetry, EKG, NS IVF 75ml/hr, low potassium diet    Thank you    [facility]  Clinical Documentation   [phone]  Options provided:  -- Hyperkalemia ruled in  -- Hyperkalemia ruled out  -- Other - I will add my own diagnosis  -- Disagree - Not applicable / Not valid  -- Disagree - Clinically unable to determine / Unknown  -- Refer to Clinical Documentation Reviewer    PROVIDER RESPONSE TEXT:    Hyperkalemia ruled out    Query created by: Jonnie Jesus on 3/15/2022 11:36 AM      Electronically signed by:  Chey Salinas MD 3/16/2022 8:09 AM

## 2022-03-16 NOTE — PROGRESS NOTES
[facility]                                                                                          Hospitalist Progress Note  Marcio Pepe MD  Answering service: [phone] OR [phone] from in house phone        Date of Service:  3/16/2022  NAME:  Cecile Nagel  :  1931  MRN:  [id number]      Admission Summary:   Cecile Nagel is a 80 y.o. male with a pmhx dementia, HTN, DM II, dyslipidemia, dysphagia, frequent falls, and FTT who is being admitted for LAMINE with hyperkalemia. When asking patient about ROS, he only states that he is cold and would like more blankets, and that he doesn't know why he is in the hospital.  He denies chest pian, dyspnea,       Interval history / Subjective:   Patinet feeling a bit better today, improved renal indices. Does not like the food. Assessment & Plan:     LAMINE - stage 2 Cr 3.0 on admit, improving.   -patient states that he has decreased PO intake, minimal intake still here. Says he does not like the food   -IVF to continue   -renal ultrasound with normal kidneys, no hydro  -trend renal function  -Avoid renal toxins, and hypotension. Renally dose meds.     Possible aortic aneurysm - incidental   - Seen on renal US  - Cannot do CTA due to LAMINE, obtain MRA, discussed with radiology   - Monitor for symptoms, abdominal pain      HTN -hold lisinopril  HLD    Dementia  -consult case management for dispo plans back to the [facility]    Pseudohyperkalemia - hemolyzed sample        Code status: FULL  Prophylaxis: heparin   Care Plan discussed with: RN, Pt,   Anticipated Disposition: Back to facility when stable 24-48 hours, with improved renal function and ruling out aneurysm      Hospital Problems  Never Reviewed          Codes Class Noted POA    Hyperkalemia ICD-10-CM: E87.5  ICD-9-CM: 276.7  3/14/2022 Unknown                Review of Systems:   A comprehensive review of systems was negative except for that written in the HPI. Vital Signs:    Last 24hrs VS reviewed since prior progress note. Most recent are:  Visit Vitals  /62 (BP 1 Location: Left upper arm, BP Patient Position: At rest)   Pulse 80   Temp 98.8 °F (37.1 °C)   Resp 14   Ht 5' 9\" (1.753 m)   Wt 78.7 kg (173 lb 8 oz)   SpO2 98%   BMI 25.62 kg/m²         Intake/Output Summary (Last 24 hours) at 3/16/2022 1626  Last data filed at 3/16/2022 1208  Gross per 24 hour   Intake 200 ml   Output    Net 200 ml        Physical Examination:     I had a face to face encounter with this patient and independently examined them on 3/16/2022 as outlined below:          Constitutional:  No acute distress, cooperative, pleasant. ENT:  Oral mucosa moist, oropharynx benign. Resp:  CTA bilaterally. No wheezing/rhonchi/rales. No accessory muscle use. CV:  Regular rhythm, normal rate, no murmurs, gallops, rubs    GI:  Soft, non distended, non tender. normoactive bowel sounds, no hepatosplenomegaly     Musculoskeletal:  No edema, warm, 2+ pulses throughout    Neurologic:  Moves all extremities. AAOx2 (person and place, not time), CN II-XII reviewed            Data Review:    Review and/or order of clinical lab test  Review and/or order of tests in the radiology section of CPT  Review and/or order of tests in the medicine section of CPT      Labs:     Recent Labs     03/16/22  0250 03/15/22  0008   WBC 4.5 6.9   HGB 10.0* 10.0*   HCT 32.9* 32.8*    224     Recent Labs     03/16/22  0250 03/15/22  0008 03/14/22  1706    135* 133*   K 4.3 4.8 6.1*    102 101   CO2 28 30 24   BUN 35* 48* 46*   CREA 1.99* 2.81* 3.13*   GLU 76 98 146*   CA 9.3 9.8 9.4   MG 2.1  --   --    PHOS 2.7 4.2  --      Recent Labs     03/15/22  0008 03/14/22  1706   ALT  --  13   AP  --  56   TBILI  --  0.5   TP  --  7.2   ALB 2.6* 2.8*   GLOB  --  4.4*     No results for input(s): INR, PTP, APTT, INREXT in the last 72 hours.    No results for input(s): FE, TIBC, PSAT, FERR in the last 72 hours. No results found for: FOL, RBCF   No results for input(s): PH, PCO2, PO2 in the last 72 hours. No results for input(s): CPK, CKNDX, TROIQ in the last 72 hours.     No lab exists for component: CPKMB  Lab Results   Component Value Date/Time    Cholesterol, total 129 01/05/2022 04:48 AM    HDL Cholesterol 35 01/05/2022 04:48 AM    LDL, calculated 67.8 01/05/2022 04:48 AM    Triglyceride 131 01/05/2022 04:48 AM    CHOL/HDL Ratio 3.7 01/05/2022 04:48 AM     Lab Results   Component Value Date/Time    Glucose (POC) 94 01/06/2022 05:45 AM    Glucose (POC) 84 01/06/2022 01:31 AM    Glucose (POC) 111 01/05/2022 04:39 PM    Glucose (POC) 128 (H) 01/05/2022 11:47 AM    Glucose (POC) 90 01/05/2022 07:24 AM     No results found for: COLOR, APPRN, SPGRU, REFSG, JOSE G, PROTU, GLUCU, KETU, BILU, UROU, STEPHANIE, LEUKU, GLUKE, EPSU, BACTU, WBCU, RBCU, CASTS, UCRY      Medications Reviewed:     Current Facility-Administered Medications   Medication Dose Route Frequency    sodium chloride (NS) flush 5-40 mL  5-40 mL IntraVENous Q8H    sodium chloride (NS) flush 5-40 mL  5-40 mL IntraVENous PRN    acetaminophen (TYLENOL) tablet 650 mg  650 mg Oral Q6H PRN    Or    acetaminophen (TYLENOL) suppository 650 mg  650 mg Rectal Q6H PRN    polyethylene glycol (MIRALAX) packet 17 g  17 g Oral DAILY PRN    ondansetron (ZOFRAN ODT) tablet 4 mg  4 mg Oral Q8H PRN    Or    ondansetron (ZOFRAN) injection 4 mg  4 mg IntraVENous Q6H PRN    heparin (porcine) injection 5,000 Units  5,000 Units SubCUTAneous Q8H     ______________________________________________________________________  EXPECTED LENGTH OF STAY: 2d 4h  ACTUAL LENGTH OF STAY:          2                 Quinton Gamez MD

## 2022-03-17 ENCOUNTER — APPOINTMENT (OUTPATIENT)
Dept: CT IMAGING | Age: 87
DRG: 684 | End: 2022-03-17
Attending: HOSPITALIST
Payer: MEDICARE

## 2022-03-17 LAB
ALBUMIN SERPL-MCNC: 2.4 G/DL (ref 3.5–5)
ANION GAP SERPL CALC-SCNC: 4 MMOL/L (ref 5–15)
ANION GAP SERPL CALC-SCNC: 5 MMOL/L (ref 5–15)
BASOPHILS # BLD: 0.1 K/UL (ref 0–0.1)
BASOPHILS NFR BLD: 1 % (ref 0–1)
BUN SERPL-MCNC: 27 MG/DL (ref 6–20)
BUN SERPL-MCNC: 28 MG/DL (ref 6–20)
BUN/CREAT SERPL: 18 (ref 12–20)
BUN/CREAT SERPL: 19 (ref 12–20)
CALCIUM SERPL-MCNC: 9.3 MG/DL (ref 8.5–10.1)
CALCIUM SERPL-MCNC: 9.4 MG/DL (ref 8.5–10.1)
CHLORIDE SERPL-SCNC: 108 MMOL/L (ref 97–108)
CHLORIDE SERPL-SCNC: 108 MMOL/L (ref 97–108)
CO2 SERPL-SCNC: 25 MMOL/L (ref 21–32)
CO2 SERPL-SCNC: 26 MMOL/L (ref 21–32)
COVID-19 RAPID TEST, COVR: NOT DETECTED
CREAT SERPL-MCNC: 1.45 MG/DL (ref 0.7–1.3)
CREAT SERPL-MCNC: 1.53 MG/DL (ref 0.7–1.3)
DIFFERENTIAL METHOD BLD: ABNORMAL
EOSINOPHIL # BLD: 0.2 K/UL (ref 0–0.4)
EOSINOPHIL NFR BLD: 2 % (ref 0–7)
ERYTHROCYTE [DISTWIDTH] IN BLOOD BY AUTOMATED COUNT: 16 % (ref 11.5–14.5)
ERYTHROCYTE [DISTWIDTH] IN BLOOD BY AUTOMATED COUNT: 16 % (ref 11.5–14.5)
GLUCOSE SERPL-MCNC: 74 MG/DL (ref 65–100)
GLUCOSE SERPL-MCNC: 79 MG/DL (ref 65–100)
HCT VFR BLD AUTO: 33.5 % (ref 36.6–50.3)
HCT VFR BLD AUTO: 33.5 % (ref 36.6–50.3)
HGB BLD-MCNC: 10.2 G/DL (ref 12.1–17)
HGB BLD-MCNC: 10.3 G/DL (ref 12.1–17)
IMM GRANULOCYTES # BLD AUTO: 0.1 K/UL (ref 0–0.04)
IMM GRANULOCYTES NFR BLD AUTO: 1 % (ref 0–0.5)
LYMPHOCYTES # BLD: 1 K/UL (ref 0.8–3.5)
LYMPHOCYTES NFR BLD: 13 % (ref 12–49)
MAGNESIUM SERPL-MCNC: 2 MG/DL (ref 1.6–2.4)
MCH RBC QN AUTO: 29.7 PG (ref 26–34)
MCH RBC QN AUTO: 29.9 PG (ref 26–34)
MCHC RBC AUTO-ENTMCNC: 30.4 G/DL (ref 30–36.5)
MCHC RBC AUTO-ENTMCNC: 30.7 G/DL (ref 30–36.5)
MCV RBC AUTO: 96.5 FL (ref 80–99)
MCV RBC AUTO: 98.2 FL (ref 80–99)
MONOCYTES # BLD: 0.9 K/UL (ref 0–1)
MONOCYTES NFR BLD: 13 % (ref 5–13)
NEUTS SEG # BLD: 5 K/UL (ref 1.8–8)
NEUTS SEG NFR BLD: 70 % (ref 32–75)
NRBC # BLD: 0 K/UL (ref 0–0.01)
NRBC # BLD: 0 K/UL (ref 0–0.01)
NRBC BLD-RTO: 0 PER 100 WBC
NRBC BLD-RTO: 0 PER 100 WBC
PHOSPHATE SERPL-MCNC: 2.2 MG/DL (ref 2.6–4.7)
PHOSPHATE SERPL-MCNC: 2.6 MG/DL (ref 2.6–4.7)
PLATELET # BLD AUTO: 211 K/UL (ref 150–400)
PLATELET # BLD AUTO: 223 K/UL (ref 150–400)
PMV BLD AUTO: 9.5 FL (ref 8.9–12.9)
PMV BLD AUTO: 9.8 FL (ref 8.9–12.9)
POTASSIUM SERPL-SCNC: 4 MMOL/L (ref 3.5–5.1)
POTASSIUM SERPL-SCNC: 5.2 MMOL/L (ref 3.5–5.1)
RBC # BLD AUTO: 3.41 M/UL (ref 4.1–5.7)
RBC # BLD AUTO: 3.47 M/UL (ref 4.1–5.7)
SODIUM SERPL-SCNC: 138 MMOL/L (ref 136–145)
SODIUM SERPL-SCNC: 138 MMOL/L (ref 136–145)
SOURCE, COVRS: NORMAL
WBC # BLD AUTO: 7.1 K/UL (ref 4.1–11.1)
WBC # BLD AUTO: 7.7 K/UL (ref 4.1–11.1)

## 2022-03-17 PROCEDURE — 74011250636 HC RX REV CODE- 250/636: Performed by: FAMILY MEDICINE

## 2022-03-17 PROCEDURE — 74011000250 HC RX REV CODE- 250: Performed by: FAMILY MEDICINE

## 2022-03-17 PROCEDURE — 84100 ASSAY OF PHOSPHORUS: CPT

## 2022-03-17 PROCEDURE — 85027 COMPLETE CBC AUTOMATED: CPT

## 2022-03-17 PROCEDURE — 65660000000 HC RM CCU STEPDOWN

## 2022-03-17 PROCEDURE — 74011250637 HC RX REV CODE- 250/637: Performed by: FAMILY MEDICINE

## 2022-03-17 PROCEDURE — 80048 BASIC METABOLIC PNL TOTAL CA: CPT

## 2022-03-17 PROCEDURE — 85025 COMPLETE CBC W/AUTO DIFF WBC: CPT

## 2022-03-17 PROCEDURE — 97530 THERAPEUTIC ACTIVITIES: CPT

## 2022-03-17 PROCEDURE — 74011250636 HC RX REV CODE- 250/636: Performed by: INTERNAL MEDICINE

## 2022-03-17 PROCEDURE — 80069 RENAL FUNCTION PANEL: CPT

## 2022-03-17 PROCEDURE — 83735 ASSAY OF MAGNESIUM: CPT

## 2022-03-17 PROCEDURE — 36415 COLL VENOUS BLD VENIPUNCTURE: CPT

## 2022-03-17 PROCEDURE — 87635 SARS-COV-2 COVID-19 AMP PRB: CPT

## 2022-03-17 PROCEDURE — 97161 PT EVAL LOW COMPLEX 20 MIN: CPT

## 2022-03-17 RX ADMIN — HEPARIN SODIUM 5000 UNITS: 5000 INJECTION INTRAVENOUS; SUBCUTANEOUS at 13:00

## 2022-03-17 RX ADMIN — SODIUM CHLORIDE 75 ML/HR: 9 INJECTION, SOLUTION INTRAVENOUS at 22:10

## 2022-03-17 RX ADMIN — SODIUM CHLORIDE, PRESERVATIVE FREE 10 ML: 5 INJECTION INTRAVENOUS at 21:32

## 2022-03-17 RX ADMIN — ACETAMINOPHEN 650 MG: 325 TABLET ORAL at 08:55

## 2022-03-17 RX ADMIN — HEPARIN SODIUM 5000 UNITS: 5000 INJECTION INTRAVENOUS; SUBCUTANEOUS at 05:04

## 2022-03-17 RX ADMIN — HEPARIN SODIUM 5000 UNITS: 5000 INJECTION INTRAVENOUS; SUBCUTANEOUS at 21:32

## 2022-03-17 NOTE — PROGRESS NOTES
Problem: Mobility Impaired (Adult and Pediatric)  Goal: *Acute Goals and Plan of Care (Insert Text)  Description: FUNCTIONAL STATUS PRIOR TO ADMISSION: patient states walking with RW with assistance and also using power scooter    HOME SUPPORT PRIOR TO ADMISSION: from long term care facility x 1 year; had assistance for ADLS and mobility    Physical Therapy Goals  Initiated 3/17/2022  1. Patient will move from supine to sit and sit to supine  and roll side to side in bed with minimal assistance/contact guard assist within 7 day(s). 2.  Patient will transfer from bed to chair and chair to bed with minimal assistance/contact guard assist using the least restrictive device within 7 day(s). 3.  Patient will perform sit to stand with minimal assistance/contact guard assist within 7 day(s). 4.  Patient will ambulate with minimal assistance/contact guard assist for 50 feet with the least restrictive device within 7 day(s). Outcome: Progressing Towards Goal   PHYSICAL THERAPY EVALUATION  Patient: Viktor Hamilton (89 y.o. male)  Date: 3/17/2022  Primary Diagnosis: Hyperkalemia [E87.5]        Precautions: fall         ASSESSMENT  Based on the objective data described below, the patient presents with decreased strength, balance , bed mobility, transfers and impaired gait. Per chart review and patient report he does appear below his baseline with mobility. Appears to have very \"stiff\" joints with noted crepitus. He was most pleasant and eager to get up out of bed. Anticipate return to LTC but may benefit from therapy if truly below his baseline. .    Current Level of Function Impacting Discharge (mobility/balance): mod assist     Functional Outcome Measure: The patient scored Total: 10/100 on the Barthel Index outcome measure which is indicative of being 90% dependent on other for mobility and  in basic self-care.      Other factors to consider for discharge:      Patient will benefit from skilled therapy intervention to address the above noted impairments. PLAN :  Recommendations and Planned Interventions: bed mobility training, transfer training, gait training, therapeutic exercises, patient and family training/education, and therapeutic activities      Frequency/Duration: Patient will be followed by physical therapy:  3 times a week to address goals. Recommendation for discharge: (in order for the patient to meet his/her long term goals)  To be determined: back to LTC but could possibly benefit from therapy if truly below his baseline    This discharge recommendation:  Has not yet been discussed the attending provider and/or case management    IF patient discharges home will need the following DME: patient owns DME required for discharge         SUBJECTIVE:   Patient stated I haven't been up yet . It will be nice.     OBJECTIVE DATA SUMMARY:   HISTORY:    Past Medical History:   Diagnosis Date    Dementia ([facility] [address]    No past surgical history on file. Personal factors and/or comorbidities impacting plan of care:     Home Situation  Home Environment: Long term care  One/Two Story Residence: Two story, live on 1st floor  Living Alone: No  Support Systems: Spouse/Significant Other  Patient Expects to be Discharged to[de-identified] [facility]  Current DME Used/Available at Home: Walker, rolling    EXAMINATION/PRESENTATION/DECISION MAKING:   Critical Behavior:  Neurologic State: Alert  Orientation Level: Oriented to person,Oriented to place  Cognition: Decreased attention/concentration     Hearing: Auditory  Auditory Impairment: None  Range Of Motion:  AROM: Generally decreased, functional                       Strength:    Strength: Generally decreased, functional                    Tone & Sensation:   Tone: Normal              Sensation: Intact               Coordination:  Coordination: Generally decreased, functional  Functional Mobility:  Bed Mobility:  Rolling:  Moderate assistance  Supine to Sit: Moderate assistance     Scooting: Moderate assistance  Transfers:  Sit to Stand: Moderate assistance  Stand to Sit: Minimum assistance  Stand Pivot Transfers: Moderate assistance                    Balance:   Sitting: Impaired; Without support  Sitting - Static: Good (unsupported)  Sitting - Dynamic: Fair (occasional)  Standing: Impaired; With support  Standing - Static: Constant support; Fair  Standing - Dynamic : Constant support; Fair  Ambulation/Gait Training:  Distance (ft): 3 Feet (ft)  Assistive Device: Gait belt;Walker, rolling  Ambulation - Level of Assistance: Minimal assistance     Gait Description (WDL): Exceptions to WDL  Gait Abnormalities: Decreased step clearance        Base of Support: Widened     Speed/Danisha: Delayed; Shuffled  Step Length: Right shortened;Left shortened            Functional Measure:  Barthel Index:    Bathin  Bladder: 0  Bowels: 0  Groomin  Dressin  Feedin  Mobility: 0  Stairs: 0  Toilet Use: 0  Transfer (Bed to Chair and Back): 5  Total: 10/100       The Barthel ADL Index: Guidelines  1. The index should be used as a record of what a patient does, not as a record of what a patient could do. 2. The main aim is to establish degree of independence from any help, physical or verbal, however minor and for whatever reason. 3. The need for supervision renders the patient not independent. 4. A patient's performance should be established using the best available evidence. Asking the patient, friends/relatives and nurses are the usual sources, but direct observation and common sense are also important. However direct testing is not needed. 5. Usually the patient's performance over the preceding 24-48 hours is important, but occasionally longer periods will be relevant. 6. Middle categories imply that the patient supplies over 50 per cent of the effort. 7. Use of aids to be independent is allowed.     Score Interpretation (from [address] [facility] [place] [address])    Independent   60-79 Minimally independent   40-59 Partially dependent   20-39 Very dependent   <20 Totally dependent     -Emily Lopes, Barthel, D.W. (8601). Functional evaluation: the Barthel Index. [address] ([address] [place] (1997). The Barthel activities of daily living index: self-reporting versus actual performance in the old (> or = 75 years). Journal of [address] [place] [address] 45(7), [facility], MANOHAR, Slim Cameron., [facility]. (1999). Measuring the change in disability after inpatient rehabilitation; comparison of the responsiveness of the Barthel Index and Functional Cotton Measure. Journal of Neurology, Neurosurgery, and Psychiatry, 66(4), 260-812. Paul Haley NRENE.HEIDI, PATY Caballero, & Isabela Colby MYUMIKO. (2004) Assessment of post-stroke quality of life in cost-effectiveness studies: The usefulness of the Barthel Index and the EuroQoL-5D. Quality of Life Research, 15, 144-82        Physical Therapy Evaluation Charge Determination   History Examination Presentation Decision-Making   LOW Complexity : Zero comorbidities / personal factors that will impact the outcome / POC LOW Complexity : 1-2 Standardized tests and measures addressing body structure, function, activity limitation and / or participation in recreation  LOW Complexity : Stable, uncomplicated        Based on the above components, the patient evaluation is determined to be of the following complexity level: LOW     Activity Tolerance:   Good    After treatment patient left in no apparent distress:   Sitting in chair, Call bell within reach, and Bed / chair alarm activated    COMMUNICATION/EDUCATION:   The patients plan of care was discussed with: Registered nurse. Fall prevention education was provided and the patient/caregiver indicated understanding., Patient/family have participated as able in goal setting and plan of care. , and Patient/family agree to work toward stated goals and plan of care.    Thank you for this referral.  Hailey Avila, PT   Time Calculation: 19 mins

## 2022-03-17 NOTE — PROGRESS NOTES
Bedside shift change report given to [address] [place] [address] (oncoming nurse) by YESENIA Ray (offgoing nurse). Report included the following information SBAR, Kardex, Procedure Summary, MAR and Recent Results.

## 2022-03-17 NOTE — PROGRESS NOTES
Bedside shift change report given to Cynthia Rangel (oncoming nurse) by Heri Mo RN (offgoing nurse). Report included the following information SBAR, Kardex, Procedure Summary, MAR, Recent Results and Cardiac Rhythm Sinus arrythmia with PVCs, BBB.

## 2022-03-17 NOTE — PROGRESS NOTES
Transition of Care  1. RUR 20% High  2. Disposition [facility]  3. DME walker, electric scooter  4. Transportation [facility] phone [phone] WILL CALL  5. Follow Up PCP, Specialist      Patient planned for DC back to [facility] today pending MRI of abdomen. CM notified facility via careport and has placed patient on [facility] WILL CALL. Rapid COVID needed prior to DC. CM attempted to provide wife Ryanil  an update but no answer. CM to monitor. Medicare pt has received, reviewed, and signed 2nd IM letter informing them of their right to appeal the discharge. Signed copy has been placed on pt bedside chart. Kelly Ballesteros MS    3:11 MRI pending. COVID negative. Nurse to call report to The [address] [place] [address] at [phone], patient assigned to room 112 A. [facility] remains on WILL CALL.     Kelly Ballesteros MS

## 2022-03-18 VITALS
SYSTOLIC BLOOD PRESSURE: 112 MMHG | TEMPERATURE: 98.7 F | WEIGHT: 181.44 LBS | HEART RATE: 87 BPM | HEIGHT: 69 IN | OXYGEN SATURATION: 96 % | DIASTOLIC BLOOD PRESSURE: 75 MMHG | BODY MASS INDEX: 26.87 KG/M2 | RESPIRATION RATE: 13 BRPM

## 2022-03-18 PROBLEM — R41.0 CONFUSION: Status: ACTIVE | Noted: 2022-01-04

## 2022-03-18 PROCEDURE — 74011250636 HC RX REV CODE- 250/636: Performed by: FAMILY MEDICINE

## 2022-03-18 PROCEDURE — 74011000250 HC RX REV CODE- 250: Performed by: FAMILY MEDICINE

## 2022-03-18 PROCEDURE — 74011250637 HC RX REV CODE- 250/637: Performed by: FAMILY MEDICINE

## 2022-03-18 RX ADMIN — HEPARIN SODIUM 5000 UNITS: 5000 INJECTION INTRAVENOUS; SUBCUTANEOUS at 05:17

## 2022-03-18 RX ADMIN — SODIUM CHLORIDE, PRESERVATIVE FREE 10 ML: 5 INJECTION INTRAVENOUS at 06:00

## 2022-03-18 RX ADMIN — ACETAMINOPHEN 650 MG: 325 TABLET ORAL at 05:17

## 2022-03-18 NOTE — PROGRESS NOTES
Problem: Pressure Injury - Risk of  Goal: *Prevention of pressure injury  Description: Document Jonathon Scale and appropriate interventions in the flowsheet. Outcome: Progressing Towards Goal  Note: Pressure Injury Interventions:  Sensory Interventions: Assess changes in LOC,Avoid rigorous massage over bony prominences,Keep linens dry and wrinkle-free,Maintain/enhance activity level,Minimize linen layers,Pressure redistribution bed/mattress (bed type),Turn and reposition approx. every two hours (pillows and wedges if needed)    Moisture Interventions: Absorbent underpads,Check for incontinence Q2 hours and as needed,Internal/External urinary devices,Limit adult briefs,Maintain skin hydration (lotion/cream),Minimize layers    Activity Interventions: Increase time out of bed,Pressure redistribution bed/mattress(bed type)    Mobility Interventions: Pressure redistribution bed/mattress (bed type),Turn and reposition approx. every two hours(pillow and wedges)    Nutrition Interventions: Document food/fluid/supplement intake    Friction and Shear Interventions: Lift sheet,Minimize layers,Transferring/repositioning devices                Problem: Patient Education: Go to Patient Education Activity  Goal: Patient/Family Education  Outcome: Progressing Towards Goal     Problem: Falls - Risk of  Goal: *Absence of Falls  Description: Document Jigna Fall Risk and appropriate interventions in the flowsheet.   Outcome: Progressing Towards Goal  Note: Fall Risk Interventions:  Mobility Interventions: Bed/chair exit alarm,Communicate number of staff needed for ambulation/transfer,Patient to call before getting OOB,PT Consult for mobility concerns,Strengthening exercises (ROM-active/passive),Utilize walker, cane, or other assistive device    Mentation Interventions: Bed/chair exit alarm,Door open when patient unattended,More frequent rounding,Increase mobility,Reorient patient,Room close to nurse's station    Medication Interventions: Bed/chair exit alarm,Evaluate medications/consider consulting pharmacy,Patient to call before getting OOB,Teach patient to arise slowly    Elimination Interventions: Bed/chair exit alarm,Call light in reach,Patient to call for help with toileting needs    History of Falls Interventions: Bed/chair exit alarm,Door open when patient unattended,Evaluate medications/consider consulting pharmacy,Investigate reason for fall,Room close to nurse's station         Problem: Patient Education: Go to Patient Education Activity  Goal: Patient/Family Education  Outcome: Progressing Towards Goal     Problem: General Medical Care Plan  Goal: *Vital signs within specified parameters  Outcome: Progressing Towards Goal  Goal: *Labs within defined limits  Outcome: Progressing Towards Goal  Goal: *Absence of infection signs and symptoms  Outcome: Progressing Towards Goal  Goal: *Optimal pain control at patient's stated goal  Outcome: Progressing Towards Goal  Goal: *Skin integrity maintained  Outcome: Progressing Towards Goal  Goal: *Fluid volume balance  Outcome: Progressing Towards Goal  Goal: *Optimize nutritional status  Outcome: Progressing Towards Goal  Goal: *Anxiety reduced or absent  Outcome: Progressing Towards Goal  Goal: *Progressive mobility and function (eg: ADL's)  Outcome: Progressing Towards Goal     Problem: Patient Education: Go to Patient Education Activity  Goal: Patient/Family Education  Outcome: Progressing Towards Goal     Problem: Patient Education: Go to Patient Education Activity  Goal: Patient/Family Education  Outcome: Progressing Towards Goal

## 2022-03-18 NOTE — PROGRESS NOTES
Transition of Care  1. RUR 19% Moderate  2. Disposition [facility]  3. DME walker, electric scooter  4. Transportation [facility] phone [phone] @ 11:30 am  5. Follow Up PCP, Specialist      [facility] transport set for 11:30am. Nurse to call report to [phone]. Nurse and facility updated.     Brian Sellers MS

## 2022-03-18 NOTE — PROGRESS NOTES
Problem: Pressure Injury - Risk of  Goal: *Prevention of pressure injury  Description: Document Jonathon Scale and appropriate interventions in the flowsheet. Outcome: Progressing Towards Goal  Note: Pressure Injury Interventions:  Sensory Interventions: Assess need for specialty bed,Avoid rigorous massage over bony prominences,Keep linens dry and wrinkle-free,Maintain/enhance activity level,Minimize linen layers,Pressure redistribution bed/mattress (bed type),Turn and reposition approx. every two hours (pillows and wedges if needed)    Moisture Interventions: Absorbent underpads,Check for incontinence Q2 hours and as needed,Internal/External urinary devices,Limit adult briefs,Minimize layers    Activity Interventions: Increase time out of bed,Pressure redistribution bed/mattress(bed type),PT/OT evaluation    Mobility Interventions: Pressure redistribution bed/mattress (bed type),Turn and reposition approx. every two hours(pillow and wedges)    Nutrition Interventions: Document food/fluid/supplement intake    Friction and Shear Interventions: HOB 30 degrees or less,Lift sheet,Apply protective barrier, creams and emollients                Problem: Falls - Risk of  Goal: *Absence of Falls  Description: Document Jigna Fall Risk and appropriate interventions in the flowsheet.   Outcome: Progressing Towards Goal  Note: Fall Risk Interventions:  Mobility Interventions: Bed/chair exit alarm,Communicate number of staff needed for ambulation/transfer,Patient to call before getting OOB,PT Consult for mobility concerns,PT Consult for assist device competence,Strengthening exercises (ROM-active/passive),Utilize walker, cane, or other assistive device    Mentation Interventions: Adequate sleep, hydration, pain control,Bed/chair exit alarm,Door open when patient unattended,Increase mobility,More frequent rounding,Reorient patient,Room close to nurse's station    Medication Interventions: Evaluate medications/consider consulting pharmacy,Bed/chair exit alarm,Patient to call before getting OOB,Teach patient to arise slowly    Elimination Interventions: Call light in reach,Bed/chair exit alarm,Stay With Me (per policy)    History of Falls Interventions: Bed/chair exit alarm,Investigate reason for fall,Door open when patient unattended,Evaluate medications/consider consulting pharmacy,Room close to nurse's station         Problem: General Medical Care Plan  Goal: *Vital signs within specified parameters  Outcome: Progressing Towards Goal

## 2022-03-18 NOTE — DISCHARGE INSTRUCTIONS
Discharging provider: Joselito Kee MD    Primary care provider: Suhail Velasquez MD    [facility] COURSE:    80 y. o. male with a pmhx dementia, HTN, DM II, dyslipidemia, dysphagia, frequent falls, and FTT who is being admitted for LAMINE with hyperkalemia.  When asking patient about ROS, he only states that he is cold and would like more blankets, and that he doesn't know why he is in the hospital.        LAMINE - stage 2 Cr 3.0 on admit, improved   -patient states that he has decreased PO intake, minimal intake still here. Says he does not like the food   -improved with IVF    -renal ultrasound with normal kidneys, no hydro  -trend renal function  -Avoid renal toxins, and hypotension.  Renally dose meds. -d/c Lasix/Lisinopril      Possible aortic aneurysm - incidental   - Seen on renal US  - outpatient work up, attempted CTA but unable get IV access. Since this is non-emergent test as patient's condition is stable, would defer to have outpatient work up at this time. - Monitor for symptoms, abdominal pain   - BP Management        HTN -BP has been stable off all BP meds, patient is risk for significant hypotension based on home med list. Will hold for now. HLD- c/w statin      Dementia  -consult case management for dispo plans back to the [facility]     Pseudohyperkalemia - hemolyzed sample     FOLLOW-UP CARE RECOMMENDATIONS:    APPOINTMENTS:  Follow-up Information     Follow up With Specialties Details Why Contact Info    Suhail Velasquez MD Internal Medicine In 1 week Discharge follow up  [address] [facility]  [phone]               It is very important that you keep follow-up appointment(s). Bring discharge papers, medication list (and/or medication bottles) to follow-up appointments for review by outpatient provider(s).     FOLLOW-UP TESTS RECOMMENDED:   - repeat Renal function in 2 weeks     ONGOING TREATMENT PLAN: as above     PENDING TEST RESULTS:  At the time of discharge the following test results are still pending: none. Please review these results as they become available. Specific symptoms to watch for: chest pain, shortness of breath, fever, chills, nausea, vomiting, diarrhea, change in mentation, falling, weakness, bleeding. DIET:  Regular Diet    ACTIVITY:  Activity as tolerated    WOUND CARE: NONE    EQUIPMENT needed:  NONE    INCIDENTAL FINDINGS:  NONE    GOALS OF CARE:    Eventual return to home/independent/assisted living   X  Long term SNF      Hospice     No rehospitalization     Patient condition at discharge:   Functional status    Poor    X  Deconditioned      Independent   Cognition    Lucid     Forgetful (some sensescence)   X  Dementia   Catheters/lines (plus indication)    Pham     PICC      PEG         Code status  X  Full code      DNR    . . . . . . . . . . . . . . . . . . . . . . . . . . . . . . . . . . . . . . . . . . . . . . . . . . . . . . . . . . . . . . . . . . . . . . . Twyla Miranda      CHRONIC MEDICAL CONDITIONS:  Problem List as of 3/17/2022 Never Reviewed          Codes Class Noted - Resolved    Hyperkalemia ICD-10-CM: E87.5  ICD-9-CM: 276.7  3/14/2022 - Present        Blurred vision ICD-10-CM: H53.8  ICD-9-CM: 368.8  1/4/2022 - Present        Confusion ICD-10-CM: R41.0  ICD-9-CM: 298.9  1/4/2022 - Present

## 2022-03-18 NOTE — PROGRESS NOTES
Bedside shift change report given to OBED Stevenson (oncoming nurse) by Erin Munoz (offgoing nurse). Report included the following information SBAR, Kardex, MAR and Recent Results.

## 2022-03-18 NOTE — PROGRESS NOTES
2000: Off-ging RN informed this RN that a 20 G IV needed for scheduled CTA. Off-going RNs unsuccessful in attempts. On-coming RNs unsuccessful as well. 2230: CCU called for assistance with gaining IV access and they were unable to come and try. 0530: RN attempted to draw labs and was unsuccessful. 0800: Bedside and Verbal shift change report given to [facility], RN (oncoming nurse) by Lior Daniel RN (offgoing nurse). Report included the following information SBAR, Kardex, Procedure Summary, Intake/Output, MAR and Cardiac Rhythm NSR, PVCs.

## 2022-03-18 NOTE — PROGRESS NOTES
Pt discharged by ambulance to facility set up by case management. RN called report to [facility] at Nursing facility. RN gave information on pts history, mobility up x 2 with walker, AO x 2-3, RA, no medication given during [id number] shift, pt on soft chew dysphagia diet, FULL CODE, male external catheter, no belongings besides shoes on. Pt most recent labs are from 3/17. Pt daughter and wife at bedside during transfer and will meet pt at facility. Ambulance drivers given verbal report and pts history. Drivers have folder with all paperwork included in it. Pt d/c at 1115.

## 2022-03-18 NOTE — PROGRESS NOTES
[id number] Adult  Hospitalist Group                                                                                          Hospitalist Progress Note  González Bee MD  Answering service: [phone] from in house phone        Date of Service:  3/18/2022  NAME:  Juan Pablo Rodriguez  :  1931  MRN:  [id number]      Admission Summary:   Juan Pablo Rodriguez is a 80 y.o. male with a pmhx dementia, HTN, DM II, dyslipidemia, dysphagia, frequent falls, and FTT who is being admitted for LAMINE with hyperkalemia. When asking patient about ROS, he only states that he is cold and would like more blankets, and that he doesn't know why he is in the hospital.  He denies chest pian, dyspnea,       Interval history / Subjective:   Patinet feeling a bit better today, improved renal indices. Does not like the food. Assessment & Plan:     LAMINE - stage 2 Cr 3.0 on admit, improving.   -patient states that he has decreased PO intake, minimal intake still here. Says he does not like the food   -IVF to continue   -renal ultrasound with normal kidneys, no hydro  -trend renal function  -Avoid renal toxins, and hypotension. Renally dose meds.     Possible aortic aneurysm - incidental finding, asymptomatic   - Seen on renal US  - outpatient work up, attempted CTA but unable get IV access. Since this is non-emergent test as patient's condition is stable, would defer to have outpatient work up at this time.    - Monitor for symptoms, abdominal pain      HTN -hold lisinopril  HLD    Dementia  -consult case management for dispo plans back to the [facility]    Pseudohyperkalemia - hemolyzed sample        Code status: FULL  Prophylaxis: heparin   Care Plan discussed with: RN, Pt,   Anticipated Disposition: Back to facility when stable 24-48 hours, with improved renal function and ruling out aneurysm      Hospital Problems  Never Reviewed          Codes Class Noted POA    Hyperkalemia ICD-10-CM: E87.5  ICD-9-CM: 276.7 3/14/2022 Unknown                Review of Systems:   A comprehensive review of systems was negative except for that written in the HPI. Vital Signs:    Last 24hrs VS reviewed since prior progress note. Most recent are:  Visit Vitals  /73 (BP 1 Location: Left upper arm, BP Patient Position: At rest)   Pulse 92   Temp 99.2 °F (37.3 °C)   Resp 17   Ht 5' 9\" (1.753 m)   Wt 82.3 kg (181 lb 7 oz)   SpO2 94%   BMI 26.79 kg/m²         Intake/Output Summary (Last 24 hours) at 3/18/2022 2455  Last data filed at 3/18/2022 0432  Gross per 24 hour   Intake 2532.5 ml   Output    Net 2532.5 ml        Physical Examination:     I had a face to face encounter with this patient and independently examined them on 3/18/2022 as outlined below:          Constitutional:  No acute distress, cooperative, pleasant. ENT:  Oral mucosa moist, oropharynx benign. Resp:  CTA bilaterally. No wheezing/rhonchi/rales. No accessory muscle use. CV:  Regular rhythm, normal rate, no murmurs, gallops, rubs    GI:  Soft, non distended, non tender. normoactive bowel sounds, no hepatosplenomegaly     Musculoskeletal:  No edema, warm, 2+ pulses throughout    Neurologic:  Moves all extremities. AAOx2 (person and place, not time), CN II-XII reviewed            Data Review:    Review and/or order of clinical lab test  Review and/or order of tests in the radiology section of CPT  Review and/or order of tests in the medicine section of CPT      Labs:     Recent Labs     03/17/22 0513 03/17/22 0441   WBC 7.7 7.1   HGB 10.3* 10.2*   HCT 33.5* 33.5*    211     Recent Labs     03/17/22  0513 03/17/22  0441 03/16/22  0250    138 138   K 5.2* 4.0 4.3    108 107   CO2 25 26 28   BUN 27* 28* 35*   CREA 1.53* 1.45* 1.99*   GLU 74 79 76   CA 9.4 9.3 9.3   MG  --  2.0 2.1   PHOS 2.6 2.2* 2.7     Recent Labs     03/17/22  0513   ALB 2.4*     No results for input(s): INR, PTP, APTT, INREXT, INREXT in the last 72 hours.    No results for input(s): FE, TIBC, PSAT, FERR in the last 72 hours. No results found for: FOL, RBCF   No results for input(s): PH, PCO2, PO2 in the last 72 hours. No results for input(s): CPK, CKNDX, TROIQ in the last 72 hours.     No lab exists for component: CPKMB  Lab Results   Component Value Date/Time    Cholesterol, total 129 01/05/2022 04:48 AM    HDL Cholesterol 35 01/05/2022 04:48 AM    LDL, calculated 67.8 01/05/2022 04:48 AM    Triglyceride 131 01/05/2022 04:48 AM    CHOL/HDL Ratio 3.7 01/05/2022 04:48 AM     Lab Results   Component Value Date/Time    Glucose (POC) 94 01/06/2022 05:45 AM    Glucose (POC) 84 01/06/2022 01:31 AM    Glucose (POC) 111 01/05/2022 04:39 PM    Glucose (POC) 128 (H) 01/05/2022 11:47 AM    Glucose (POC) 90 01/05/2022 07:24 AM     No results found for: COLOR, APPRN, SPGRU, REFSG, JOSE G, PROTU, GLUCU, KETU, BILU, UROU, STEPHANIE, LEUKU, GLUKE, EPSU, BACTU, WBCU, RBCU, CASTS, UCRY      Medications Reviewed:     Current Facility-Administered Medications   Medication Dose Route Frequency    0.9% sodium chloride infusion  75 mL/hr IntraVENous CONTINUOUS    sodium chloride (NS) flush 5-40 mL  5-40 mL IntraVENous Q8H    sodium chloride (NS) flush 5-40 mL  5-40 mL IntraVENous PRN    acetaminophen (TYLENOL) tablet 650 mg  650 mg Oral Q6H PRN    Or    acetaminophen (TYLENOL) suppository 650 mg  650 mg Rectal Q6H PRN    polyethylene glycol (MIRALAX) packet 17 g  17 g Oral DAILY PRN    ondansetron (ZOFRAN ODT) tablet 4 mg  4 mg Oral Q8H PRN    Or    ondansetron (ZOFRAN) injection 4 mg  4 mg IntraVENous Q6H PRN    heparin (porcine) injection 5,000 Units  5,000 Units SubCUTAneous Q8H     ______________________________________________________________________  EXPECTED LENGTH OF STAY: 2d 4h  ACTUAL LENGTH OF STAY:          4                 Aracelis MD Kathleen

## 2022-03-19 PROBLEM — H53.8 BLURRED VISION: Status: ACTIVE | Noted: 2022-01-04

## 2022-03-20 PROBLEM — E87.5 HYPERKALEMIA: Status: ACTIVE | Noted: 2022-03-14

## 2022-05-17 ENCOUNTER — APPOINTMENT (OUTPATIENT)
Dept: GENERAL RADIOLOGY | Age: 87
DRG: 291 | End: 2022-05-17
Attending: EMERGENCY MEDICINE
Payer: MEDICARE

## 2022-05-17 ENCOUNTER — OFFICE VISIT (OUTPATIENT)
Dept: EMERGENCY DEPT | Age: 87
DRG: 291 | End: 2022-05-17
Attending: EMERGENCY MEDICINE
Payer: MEDICARE

## 2022-05-17 ENCOUNTER — HOSPITAL ENCOUNTER (INPATIENT)
Age: 87
LOS: 7 days | Discharge: NURSING, REHAB OR LONG-TERM CARE | DRG: 291 | End: 2022-05-24
Attending: EMERGENCY MEDICINE | Admitting: FAMILY MEDICINE
Payer: MEDICARE

## 2022-05-17 DIAGNOSIS — R06.02 SHORTNESS OF BREATH: ICD-10-CM

## 2022-05-17 DIAGNOSIS — F03.90 DEMENTIA WITHOUT BEHAVIORAL DISTURBANCE, UNSPECIFIED DEMENTIA TYPE: ICD-10-CM

## 2022-05-17 DIAGNOSIS — R09.02 HYPOXIA: ICD-10-CM

## 2022-05-17 DIAGNOSIS — R53.81 PHYSICAL DEBILITY: ICD-10-CM

## 2022-05-17 DIAGNOSIS — I21.4 NSTEMI (NON-ST ELEVATED MYOCARDIAL INFARCTION) (HCC): ICD-10-CM

## 2022-05-17 DIAGNOSIS — I10 ESSENTIAL HYPERTENSION: ICD-10-CM

## 2022-05-17 DIAGNOSIS — I50.9 ACUTE CONGESTIVE HEART FAILURE, UNSPECIFIED HEART FAILURE TYPE (HCC): Primary | ICD-10-CM

## 2022-05-17 DIAGNOSIS — Z71.89 DNR (DO NOT RESUSCITATE) DISCUSSION: ICD-10-CM

## 2022-05-17 DIAGNOSIS — I48.0 PAF (PAROXYSMAL ATRIAL FIBRILLATION) (HCC): ICD-10-CM

## 2022-05-17 LAB
ALBUMIN SERPL-MCNC: 3.2 G/DL (ref 3.5–5)
ALBUMIN/GLOB SERPL: 0.7 {RATIO} (ref 1.1–2.2)
ALP SERPL-CCNC: 76 U/L (ref 45–117)
ALT SERPL-CCNC: 36 U/L (ref 12–78)
ANION GAP SERPL CALC-SCNC: 6 MMOL/L (ref 5–15)
ARTERIAL PATENCY WRIST A: POSITIVE
ARTERIAL PATENCY WRIST A: POSITIVE
AST SERPL-CCNC: 23 U/L (ref 15–37)
B PERT DNA SPEC QL NAA+PROBE: NOT DETECTED
BASE DEFICIT BLD-SCNC: 0.1 MMOL/L
BASE EXCESS BLD CALC-SCNC: 0.7 MMOL/L
BASOPHILS # BLD: 0.1 K/UL (ref 0–0.1)
BASOPHILS NFR BLD: 1 % (ref 0–1)
BDY SITE: ABNORMAL
BDY SITE: ABNORMAL
BILIRUB SERPL-MCNC: 0.4 MG/DL (ref 0.2–1)
BNP SERPL-MCNC: ABNORMAL PG/ML
BORDETELLA PARAPERTUSSIS PCR, BORPAR: NOT DETECTED
BUN SERPL-MCNC: 34 MG/DL (ref 6–20)
BUN/CREAT SERPL: 20 (ref 12–20)
C PNEUM DNA SPEC QL NAA+PROBE: NOT DETECTED
CALCIUM SERPL-MCNC: 9.3 MG/DL (ref 8.5–10.1)
CALCULATED R AXIS, ECG10: -21 DEGREES
CALCULATED T AXIS, ECG11: 169 DEGREES
CHLORIDE SERPL-SCNC: 107 MMOL/L (ref 97–108)
CO2 SERPL-SCNC: 29 MMOL/L (ref 21–32)
COMMENT, HOLDF: NORMAL
CREAT SERPL-MCNC: 1.7 MG/DL (ref 0.7–1.3)
DIAGNOSIS, 93000: NORMAL
DIFFERENTIAL METHOD BLD: ABNORMAL
EOSINOPHIL # BLD: 0.1 K/UL (ref 0–0.4)
EOSINOPHIL NFR BLD: 1 % (ref 0–7)
ERYTHROCYTE [DISTWIDTH] IN BLOOD BY AUTOMATED COUNT: 16.2 % (ref 11.5–14.5)
FLUAV H1 2009 PAND RNA SPEC QL NAA+PROBE: NOT DETECTED
FLUAV H1 RNA SPEC QL NAA+PROBE: NOT DETECTED
FLUAV H3 RNA SPEC QL NAA+PROBE: NOT DETECTED
FLUAV SUBTYP SPEC NAA+PROBE: NOT DETECTED
FLUBV RNA SPEC QL NAA+PROBE: NOT DETECTED
GAS FLOW.O2 O2 DELIVERY SYS: ABNORMAL L/MIN
GAS FLOW.O2 O2 DELIVERY SYS: ABNORMAL L/MIN
GLOBULIN SER CALC-MCNC: 4.5 G/DL (ref 2–4)
GLUCOSE SERPL-MCNC: 167 MG/DL (ref 65–100)
HADV DNA SPEC QL NAA+PROBE: NOT DETECTED
HCO3 BLD-SCNC: 28.4 MMOL/L (ref 22–26)
HCO3 BLD-SCNC: 28.8 MMOL/L (ref 22–26)
HCOV 229E RNA SPEC QL NAA+PROBE: NOT DETECTED
HCOV HKU1 RNA SPEC QL NAA+PROBE: NOT DETECTED
HCOV NL63 RNA SPEC QL NAA+PROBE: NOT DETECTED
HCOV OC43 RNA SPEC QL NAA+PROBE: NOT DETECTED
HCT VFR BLD AUTO: 38.4 % (ref 36.6–50.3)
HGB BLD-MCNC: 11.2 G/DL (ref 12.1–17)
HMPV RNA SPEC QL NAA+PROBE: NOT DETECTED
HPIV1 RNA SPEC QL NAA+PROBE: NOT DETECTED
HPIV2 RNA SPEC QL NAA+PROBE: NOT DETECTED
HPIV3 RNA SPEC QL NAA+PROBE: NOT DETECTED
HPIV4 RNA SPEC QL NAA+PROBE: NOT DETECTED
IMM GRANULOCYTES # BLD AUTO: 0 K/UL (ref 0–0.04)
IMM GRANULOCYTES NFR BLD AUTO: 0 % (ref 0–0.5)
LACTATE SERPL-SCNC: 1.8 MMOL/L (ref 0.4–2)
LYMPHOCYTES # BLD: 0.8 K/UL (ref 0.8–3.5)
LYMPHOCYTES NFR BLD: 12 % (ref 12–49)
M PNEUMO DNA SPEC QL NAA+PROBE: NOT DETECTED
MCH RBC QN AUTO: 31.1 PG (ref 26–34)
MCHC RBC AUTO-ENTMCNC: 29.2 G/DL (ref 30–36.5)
MCV RBC AUTO: 106.7 FL (ref 80–99)
MONOCYTES # BLD: 0.5 K/UL (ref 0–1)
MONOCYTES NFR BLD: 8 % (ref 5–13)
NEUTS SEG # BLD: 4.8 K/UL (ref 1.8–8)
NEUTS SEG NFR BLD: 78 % (ref 32–75)
NRBC # BLD: 0 K/UL (ref 0–0.01)
NRBC BLD-RTO: 0 PER 100 WBC
O2/TOTAL GAS SETTING VFR VENT: 36 %
PCO2 BLD: 58.5 MMHG (ref 35–45)
PCO2 BLD: 65.5 MMHG (ref 35–45)
PH BLD: 7.25 [PH] (ref 7.35–7.45)
PH BLD: 7.29 [PH] (ref 7.35–7.45)
PLATELET # BLD AUTO: 227 K/UL (ref 150–400)
PMV BLD AUTO: 10.2 FL (ref 8.9–12.9)
PO2 BLD: 104 MMHG (ref 80–100)
PO2 BLD: 69 MMHG (ref 80–100)
POTASSIUM SERPL-SCNC: 3.9 MMOL/L (ref 3.5–5.1)
PROCALCITONIN SERPL-MCNC: <0.05 NG/ML
PROT SERPL-MCNC: 7.7 G/DL (ref 6.4–8.2)
Q-T INTERVAL, ECG07: 320 MS
QRS DURATION, ECG06: 116 MS
QTC CALCULATION (BEZET), ECG08: 497 MS
RBC # BLD AUTO: 3.6 M/UL (ref 4.1–5.7)
RBC MORPH BLD: ABNORMAL
RSV RNA SPEC QL NAA+PROBE: NOT DETECTED
RV+EV RNA SPEC QL NAA+PROBE: NOT DETECTED
SAMPLES BEING HELD,HOLD: NORMAL
SAO2 % BLD: 91 % (ref 92–97)
SAO2 % BLD: 96.7 % (ref 92–97)
SARS-COV-2 PCR, COVPCR: NOT DETECTED
SODIUM SERPL-SCNC: 142 MMOL/L (ref 136–145)
SPECIMEN TYPE: ABNORMAL
SPECIMEN TYPE: ABNORMAL
TROPONIN-HIGH SENSITIVITY: 109 NG/L (ref 0–76)
TROPONIN-HIGH SENSITIVITY: 141 NG/L (ref 0–76)
VENTRICULAR RATE, ECG03: 145 BPM
WBC # BLD AUTO: 6.3 K/UL (ref 4.1–11.1)
WBC MORPH BLD: ABNORMAL

## 2022-05-17 PROCEDURE — 94664 DEMO&/EVAL PT USE INHALER: CPT

## 2022-05-17 PROCEDURE — 71045 X-RAY EXAM CHEST 1 VIEW: CPT

## 2022-05-17 PROCEDURE — 94660 CPAP INITIATION&MGMT: CPT

## 2022-05-17 PROCEDURE — 76604 US EXAM CHEST: CPT

## 2022-05-17 PROCEDURE — 5A09457 ASSISTANCE WITH RESPIRATORY VENTILATION, 24-96 CONSECUTIVE HOURS, CONTINUOUS POSITIVE AIRWAY PRESSURE: ICD-10-PCS | Performed by: HOSPITALIST

## 2022-05-17 PROCEDURE — 74011000250 HC RX REV CODE- 250: Performed by: ANESTHESIOLOGY

## 2022-05-17 PROCEDURE — 85025 COMPLETE CBC W/AUTO DIFF WBC: CPT

## 2022-05-17 PROCEDURE — 0202U NFCT DS 22 TRGT SARS-COV-2: CPT

## 2022-05-17 PROCEDURE — 65660000001 HC RM ICU INTERMED STEPDOWN

## 2022-05-17 PROCEDURE — 82803 BLOOD GASES ANY COMBINATION: CPT

## 2022-05-17 PROCEDURE — 84484 ASSAY OF TROPONIN QUANT: CPT

## 2022-05-17 PROCEDURE — 36600 WITHDRAWAL OF ARTERIAL BLOOD: CPT

## 2022-05-17 PROCEDURE — 74011000258 HC RX REV CODE- 258: Performed by: FAMILY MEDICINE

## 2022-05-17 PROCEDURE — 84443 ASSAY THYROID STIM HORMONE: CPT

## 2022-05-17 PROCEDURE — 83605 ASSAY OF LACTIC ACID: CPT

## 2022-05-17 PROCEDURE — 74011250636 HC RX REV CODE- 250/636: Performed by: EMERGENCY MEDICINE

## 2022-05-17 PROCEDURE — 83880 ASSAY OF NATRIURETIC PEPTIDE: CPT

## 2022-05-17 PROCEDURE — 36415 COLL VENOUS BLD VENIPUNCTURE: CPT

## 2022-05-17 PROCEDURE — 94640 AIRWAY INHALATION TREATMENT: CPT

## 2022-05-17 PROCEDURE — 84145 PROCALCITONIN (PCT): CPT

## 2022-05-17 PROCEDURE — 74011000250 HC RX REV CODE- 250: Performed by: EMERGENCY MEDICINE

## 2022-05-17 PROCEDURE — 74011250637 HC RX REV CODE- 250/637: Performed by: FAMILY MEDICINE

## 2022-05-17 PROCEDURE — 99285 EMERGENCY DEPT VISIT HI MDM: CPT

## 2022-05-17 PROCEDURE — 5A1935Z RESPIRATORY VENTILATION, LESS THAN 24 CONSECUTIVE HOURS: ICD-10-PCS | Performed by: HOSPITALIST

## 2022-05-17 PROCEDURE — 93308 TTE F-UP OR LMTD: CPT

## 2022-05-17 PROCEDURE — 80053 COMPREHEN METABOLIC PANEL: CPT

## 2022-05-17 PROCEDURE — 93005 ELECTROCARDIOGRAM TRACING: CPT

## 2022-05-17 PROCEDURE — 74011000250 HC RX REV CODE- 250: Performed by: FAMILY MEDICINE

## 2022-05-17 RX ORDER — QUETIAPINE FUMARATE 25 MG/1
25 TABLET, FILM COATED ORAL ONCE
Status: COMPLETED | OUTPATIENT
Start: 2022-05-17 | End: 2022-05-17

## 2022-05-17 RX ORDER — BUMETANIDE 0.25 MG/ML
2 INJECTION INTRAMUSCULAR; INTRAVENOUS ONCE
Status: COMPLETED | OUTPATIENT
Start: 2022-05-17 | End: 2022-05-17

## 2022-05-17 RX ORDER — IPRATROPIUM BROMIDE AND ALBUTEROL SULFATE 2.5; .5 MG/3ML; MG/3ML
3 SOLUTION RESPIRATORY (INHALATION)
Status: COMPLETED | OUTPATIENT
Start: 2022-05-17 | End: 2022-05-17

## 2022-05-17 RX ORDER — IPRATROPIUM BROMIDE AND ALBUTEROL SULFATE 2.5; .5 MG/3ML; MG/3ML
3 SOLUTION RESPIRATORY (INHALATION)
Status: CANCELLED | OUTPATIENT
Start: 2022-05-17

## 2022-05-17 RX ADMIN — IPRATROPIUM BROMIDE AND ALBUTEROL SULFATE 3 ML: .5; 3 SOLUTION RESPIRATORY (INHALATION) at 13:51

## 2022-05-17 RX ADMIN — QUETIAPINE FUMARATE 25 MG: 25 TABLET ORAL at 22:04

## 2022-05-17 RX ADMIN — BUMETANIDE 2 MG: 0.25 INJECTION INTRAMUSCULAR; INTRAVENOUS at 19:07

## 2022-05-17 RX ADMIN — METHYLPREDNISOLONE SODIUM SUCCINATE 125 MG: 125 INJECTION, POWDER, FOR SOLUTION INTRAMUSCULAR; INTRAVENOUS at 14:06

## 2022-05-17 RX ADMIN — DILTIAZEM HYDROCHLORIDE 2.5 MG/HR: 5 INJECTION, SOLUTION INTRAVENOUS at 19:05

## 2022-05-17 NOTE — PROGRESS NOTES
Went to evaluate the patient, patient with altered mental status, hypoxia, hypoxemia and hypercapnia, concerned that the altered mental status is due to hypercapnia, not tolerating BiPAP well, may need intubation, spoke with Dr. Erika Manning in the ER, he will have ICU evaluate the patient, hospitalist admission on hold, await ICU evaluation, further plan of care per ED team

## 2022-05-17 NOTE — CONSULTS
Name: Gomez Cross   : 1931   MRN: [id number]   Date: 2022      Reason for ICU Consult: [facility] 80     ICU consult received. Patient with respiratory insufficiency. Consult for possible admission to critical care unit. At this time he doesn't warrant ICU level care but this could change and if so, please call back. I let the ER physician Dr. Wendy Ayala know as well as the IM physician Dr. Jaron Levin. This is a 80-year-old male with a past medical history of dementia, CKD, hypertension who was brought to [facility] with a complaint of shortness of breath. It is noted that he has a prior history of COVID-19 back in 2022, there is minimal other past medical history that can be obtained via the chart review. Per the ER physician and internal medicine physicians, patient was placed on BiPAP due to hypoxia and hypercarbia. Admission labs significant for an elevated NT pro BNP of 31,490, high-sensitivity troponin of 109, normal lactate of 1.8, and elevated creatinine of 1.70 with a GFR of 38, congruent with CKD stage III.      IMPRESSION/PROBLEM LIST   Respiratory insufficiency   Would check another covid and viral panel (ordred)  Prior COVID-19   Dementia Hx  ESRD  Elevated NT pro-BNP     Would diurese him if BP allows (ordered)      ASSESSMENT & PLAN   PULMONOLOGY:  Respiratory Goals: Head of bed > 30 degrees  PRN BiPAP/CPAP  Pulmonary toilet: Incentive Spirometry   Check covid/resp panel  Steroids  Bumex  Trend NT pro-BNP  Check Sputum Culture  Check Urine Ag's (legionell & Strep)  MRSA swab    CARDIOVASCULAR:  SBP Goal of: > 90 mmHg  MAP Goal of: > 65 mmHg  IVF: KVO  Trend trop  ECHO    HEMATOLOGY/ONCOLOGY:  Transfusion Trigger (Hgb): <7 g/dL    GASTROINTESTINAL:  Diet/Feeding:  Pending      RENAL/:  Keep K>4; Mg>2    Would consult nephrology    ENDOCRINE/INTEGUMENTARY:  Glycemic Control: SSI PRN, Prevent hypoglycemia    INFECTIOUS DISEASE:  Antibiotics: None, would add if lactate rises, or fever ensues  Cultures: Sputum      ICU DAILY CHECKLIST   Indwelling devices:  Tubes: None  Lines: Peripheral IV  Drains: None  DVT Prophylaxis: SCD's or Sequential Compression Device   SUP: Not at this time   PT/OT: PT consulted and on board, OT consulted and on board and Speech therapy consulted and on board   Code Status: Full  Goals of Care Discussion with family: Pending   Plan of Care/Family Update: Pending   Discussed Care Plan with Bedside RN: Yes     Past Medical History:      has a past medical history of Dementia ([facility] [address] Past Surgical History:      has no past surgical history on file. Home Medications:     Prior to Admission medications    Medication Sig Start Date End Date Taking? Authorizing Provider   polyvinyl alcohol (LIQUIFILM TEARS) 1.4 % ophthalmic solution Administer 1 Drop to both eyes as needed. Provider, Historical   ascorbic acid, vitamin C, (VITAMIN C) 500 mg tablet Take 500 mg by mouth daily. Provider, Historical   aspirin 81 mg chewable tablet Take 81 mg by mouth daily. Provider, Historical   calcium carbonate (OS-LUL) 500 mg calcium (1,250 mg) tablet Take  by mouth daily. Provider, Historical   cholecalciferol (VITAMIN D3) (1000 Units /25 mcg) tablet Take 4,000 Units by mouth daily. Provider, Historical   ezetimibe (ZETIA) 10 mg tablet Take 0.5 mg by mouth. Provider, Historical   ferrous sulfate 325 mg (65 mg iron) tablet Take  by mouth two (2) times a day. Provider, Historical   fluticasone propionate (FLONASE) 50 mcg/actuation nasal spray 2 Sprays by Both Nostrils route daily. Provider, Historical   loratadine 10 mg cap Take 10 mg by mouth daily. Provider, Historical   mirtazapine (REMERON) 7.5 mg tablet Take 7.5 mg by mouth nightly. Provider, Historical   omeprazole (PRILOSEC) 20 mg capsule Take 20 mg by mouth daily.     Provider, Historical   ondansetron (ZOFRAN ODT) 4 mg disintegrating tablet Take 4 mg by mouth every six (6) hours as needed for Nausea or Vomiting. Provider, Historical       Allergies/Social/Family History:     No Known Allergies   Social History     Tobacco Use    Smoking status: Not on file    Smokeless tobacco: Not on file   Substance Use Topics    Alcohol use: Not on file      No family history on file. Review of Systems:     A comprehensive review of systems was negative except for that written in the HPI. Objective:     General:  cooperative, no distress, appears stated age  Eye:  negative  Neurologic:  normal mood  Lymphatic:  Cervical, supraclavicular, and axillary nodes normal.   Neck:  normal and no erythema or exudates noted. Lungs:  Rhonchi bilaerally  Heart:  tachycardia  Abdomen:  soft, non-tender. Bowel sounds normal. No masses,  no organomegaly  Cardiovascular:  S1S2 present, without murmur or extra heart sounds, pedal pulses normal and no edema  Skin:  Bilateral pitting edema    Vital Signs:  Visit Vitals  BP (!) 130/102   Pulse (!) 124   Temp 98.6 °F (37 °C)   Resp 19   Ht 5' 2\" (1.575 m)   Wt 70.3 kg (155 lb)   SpO2 100%   BMI 28.35 kg/m²    O2 Flow Rate (L/min): 4 l/min O2 Device: Nasal cannula Temp (24hrs), Av.6 °F (37 °C), Min:98.5 °F (36.9 °C), Max:98.6 °F (37 °C)           Intake/Output:   No intake or output data in the 24 hours ending 22 1838    LABS AND  DATA: Personally reviewed 22    MEDS: Personally Reviewed 22    IMAGING: New Imaging Reviewed 22    Chest X-Ray:   CXR Results  (Last 48 hours)               22 1412  XR CHEST PORT Final result    Impression:  Moderate pulmonary edema with bilateral pleural effusions       Narrative:  EXAM: XR CHEST PORT       INDICATION: Shortness of breath       COMPARISON: 3/14/2022       FINDINGS: A portable AP radiograph of the chest was obtained at 1410 hours. The   patient is on a cardiac monitor.  There is moderate pulmonary edema with   bilateral pleural effusions, new as compared to the prior study. . The cardiac   and mediastinal contours are stable. CT Scan:   CT Results  (Last 48 hours)    None          DISPOSITION  Transfer to non-ICU bed    CRITICAL CARE DOCUMENTATION  I had a face to face encounter with the patient, reviewed and interpreted patient data including clinical events, labs, images, vital signs, I/O's, and examined patient. I have discussed the case and the plan and management of the patient's care with the consulting services, the bedside nurses and the respiratory therapist.      NOTE OF PERSONAL INVOLVEMENT IN CARE   This patient has a high probability of imminent, clinically significant deterioration, which requires the highest level of preparedness to intervene urgently. I participated in the decision-making and personally managed or directed the management of the following life and organ supporting interventions that required my frequent assessment to treat or prevent imminent deterioration. I personally spent 30 minutes of critical care time. This is time spent at this critically ill patient's bedside actively involved in patient care as well as the coordination of care. This does not include any procedural time which has been billed separately.     DO [facility]  Critical Care Medicine

## 2022-05-17 NOTE — PROGRESS NOTES
1350: Patient placed on Bipap per MD verbal order. Later switched to Cpap nasal mask as patient couldn't keep the mask on. Still pulling mask off. Placed on 4LNC at this time. ABG drawn.

## 2022-05-17 NOTE — ED TRIAGE NOTES
Patient arrived via EMS from Dementia unit, wheezing bilateral, 150 HR, duo neb in route. Sats 83 on room air. 97% on neb.

## 2022-05-17 NOTE — ED NOTES
Patient is too restless for dwyer catheter and there is concern he will pull it out.   Pt given urinal.

## 2022-05-17 NOTE — ED NOTES
Patient taking off his oxygen mask and oxygen saturation was dipping down to upper 70's each time the mask was removed. MD notified. Pt mask repositioned and adjusted and oxygen back up to 100%.

## 2022-05-17 NOTE — Clinical Note
Status[de-identified] INPATIENT [101]   Type of Bed: Intermediate Care [34]   Cardiac Monitoring Required?: Yes   Inpatient Hospitalization Certified Necessary for the Following Reasons: 9.  Other (further clarification in H&P documentation)   Admitting Diagnosis: SOB (shortness of breath) [150294]   Admitting Physician: Shane Mckoy [[id number]]   Attending Physician: Jenny Kee   Estimated Length of Stay: 3-4 Midnights   Discharge Plan[de-identified] Home with Office Follow-up

## 2022-05-17 NOTE — H&P
[address] [facility] Adult  Hospitalist Group  History and Physical    Date of Service:  5/18/2022  Primary Care Provider: Leo Byers MD  Source of information: Chart review    Chief Complaint: No chief complaint on file. History of Presenting Illness:   Peyman Munoz is a 80 y.o. male with a pmhx hypertension, CKD, DM 2, dysphagia, falls, and dementia who presented via EMS for hypoxia, and tachycardia. Per chart record, heart rate was in the 150s, and he was hypoxic to 83%. Obtained from chart review due to patient with tachypnea, and inability to complete full sentences during my evaluation. He was previously hospitalized from 3/14/2022 to 3/18/2022 for LAMINE with hyperkalemia    In the ED, he was tachycardic to the 130s, with recorded SPO2 97 to 100% on BiPAP. Labs are significant for troponin 109, probnp 31,490,  PH 7.29, PCO2 58.5, and PO2 69. Echocardiogram was done, and showed depressed EF 30 to 50%. Chest x-ray significant for moderate pulmonary edema with bilateral pleural effusions. EKG shows atrial fibrillation with  with non-specific ST-T wave changes. In the ED, he received duo nebs, 2 mg IV Bumex, 125 IV Solu-Medrol, and was placed on BiPAP. Between the time of my chart review, and evaluating the patient he had taken his BiPAP off due to not tolerating the mask. Nursing staff tried a nasal mask without success. Stat ABG ordered, and PCO2 was worsening so had a discussion with the patient, provided reassurance, treated anxiety with Seroquel, and reattempted Pap therapy. He did tolerate at this time, and ABG is improving, so will admit to [facility] at this time. REVIEW OF SYSTEMS:  Review of systems not obtained due to patient factors. Past Medical History:   Diagnosis Date    Dementia ([address]       No past surgical history on file. Prior to Admission medications    Medication Sig Start Date End Date Taking?  Authorizing Provider   polyvinyl alcohol (LIQUIFILM TEARS) 1.4 % ophthalmic solution Administer 1 Drop to both eyes as needed. Provider, Historical   ascorbic acid, vitamin C, (VITAMIN C) 500 mg tablet Take 500 mg by mouth daily. Provider, Historical   aspirin 81 mg chewable tablet Take 81 mg by mouth daily. Provider, Historical   calcium carbonate (OS-LUL) 500 mg calcium (1,250 mg) tablet Take  by mouth daily. Provider, Historical   cholecalciferol (VITAMIN D3) (1000 Units /25 mcg) tablet Take 4,000 Units by mouth daily. Provider, Historical   ezetimibe (ZETIA) 10 mg tablet Take 0.5 mg by mouth. Provider, Historical   ferrous sulfate 325 mg (65 mg iron) tablet Take  by mouth two (2) times a day. Provider, Historical   fluticasone propionate (FLONASE) 50 mcg/actuation nasal spray 2 Sprays by Both Nostrils route daily. Provider, Historical   loratadine 10 mg cap Take 10 mg by mouth daily. Provider, Historical   mirtazapine (REMERON) 7.5 mg tablet Take 7.5 mg by mouth nightly. Provider, Historical   omeprazole (PRILOSEC) 20 mg capsule Take 20 mg by mouth daily. Provider, Historical   ondansetron (ZOFRAN ODT) 4 mg disintegrating tablet Take 4 mg by mouth every six (6) hours as needed for Nausea or Vomiting. Provider, Historical     No Known Allergies   No family history on file. Social History:       Family and social history were personally reviewed, all pertinent and relevant details are outlined as above. Objective:     Visit Vitals  /76   Pulse 100   Temp 98.7 °F (37.1 °C)   Resp 22   Ht 5' 2\" (1.575 m)   Wt 70.3 kg (155 lb)   SpO2 92%   BMI 28.35 kg/m²    O2 Flow Rate (L/min): 4 l/min O2 Device: Nasal cannula    PHYSICAL EXAM:   General: Alert x oriented to person and place, but not to event. He does not know why he is in the hospital.  Awake, patient is tachypneic, cannot complete full sentences initially. , appears to be stated age  [de-identified]: PEERL, EOMI, moist mucus membranes  Neck: Supple, no JVD, no meningeal signs  Chest: Clear to auscultation bilaterally   CVS: Irregularly irregular, rate in the 90s to low 100s. , no murmurs/rubs/gallops  Abd: Soft, non-tender, non-distended, +bowel sounds   Ext: No clubbing, no cyanosis, no edema  Neuro/Psych: Pleasant mood and affect, CN 2-12 grossly intact, sensory grossly within normal limit, Strength 5/5 in all extremities  Cap refill: Brisk, less than 3 seconds  Pulses: 2+, symmetric in all extremities  Skin: Warm, dry, without rashes or lesions    Data Review: All diagnostic labs and studies have been reviewed.     Abnormal Labs Reviewed   CBC WITH AUTOMATED DIFF - Abnormal; Notable for the following components:       Result Value    RBC 3.60 (*)     HGB 11.2 (*)     .7 (*)     MCHC 29.2 (*)     RDW 16.2 (*)     NEUTROPHILS 78 (*)     All other components within normal limits   METABOLIC PANEL, COMPREHENSIVE - Abnormal; Notable for the following components:    Glucose 167 (*)     BUN 34 (*)     Creatinine 1.70 (*)     GFR est AA 46 (*)     GFR est non-AA 38 (*)     Albumin 3.2 (*)     Globulin 4.5 (*)     A-G Ratio 0.7 (*)     All other components within normal limits   TROPONIN-HIGH SENSITIVITY - Abnormal; Notable for the following components:    Troponin-High Sensitivity 109 (*)     All other components within normal limits   NT-PRO BNP - Abnormal; Notable for the following components:    NT pro-BNP 31,490 (*)     All other components within normal limits   POC G3 - PUL - Abnormal; Notable for the following components:    pH (POC) 7.29 (*)     pCO2 (POC) 58.5 (*)     pO2 (POC) 69 (*)     HCO3 (POC) 28.4 (*)     sO2 (POC) 91.0 (*)     All other components within normal limits   TROPONIN-HIGH SENSITIVITY - Abnormal; Notable for the following components:    Troponin-High Sensitivity 141 (*)     All other components within normal limits   POC G3 - PUL - Abnormal; Notable for the following components:    pH (POC) 7.25 (*)     pCO2 (POC) 65.5 (*)     pO2 (POC) 104 (*) HCO3 (POC) 28.8 (*)     All other components within normal limits   POC G3 - PUL - Abnormal; Notable for the following components:    pH (POC) 7.30 (*)     pCO2 (POC) 55.8 (*)     pO2 (POC) 127 (*)     HCO3 (POC) 27.4 (*)     sO2 (POC) 98.4 (*)     All other components within normal limits       All Micro Results     Procedure Component Value Units Date/Time    CULTURE, MRSA [[id number]] Collected: 05/17/22 1939    Order Status: Completed Specimen: Nares Updated: 05/17/22 2155    RESPIRATORY VIRUS PANEL W/COVID-19, PCR [[id number]] Collected: 05/17/22 1939    Order Status: Completed Specimen: Nasopharyngeal Updated: 05/17/22 2138     Adenovirus Not detected        Coronavirus 229E Not detected        Coronavirus HKU1 Not detected        Coronavirus CVNL63 Not detected        Coronavirus OC43 Not detected        SARS-CoV-2, PCR Not detected        Metapneumovirus Not detected        Rhinovirus and Enterovirus Not detected        Influenza A Not detected        Influenza A, subtype H1 Not detected        Influenza A, subtype H3 Not detected        INFLUENZA A H1N1 PCR Not detected        Influenza B Not detected        Parainfluenza 1 Not detected        Parainfluenza 2 Not detected        Parainfluenza 3 Not detected        Parainfluenza virus 4 Not detected        RSV by PCR Not detected        B. parapertussis, PCR Not detected        Bordetella pertussis - PCR Not detected        Chlamydophila pneumoniae DNA, QL, PCR Not detected        Mycoplasma pneumoniae DNA, QL, PCR Not detected       CULTURE, RESPIRATORY/SPUTUM/BRONCH Traci Thomas [[id number]]     Order Status: Sent Specimen: Sputum     ERICK Sawyer UR/CSF [[id number]]     Order Status: Sent     LEGIONELLA PNEUMOPHILA Lupe Rolls URINE [[id number]]     Order Status: Sent Specimen: Urine           IMAGING:   XR CHEST PORT   Final Result   Moderate pulmonary edema with bilateral pleural effusions      POC US ECHOCARDIO TRANSTHORACIC LTD   Final Result      POC US CHEST INCLUDING MEDIASTINUM   Final Result           ECG/ECHO:    Results for orders placed or performed during the hospital encounter of 05/17/22   EKG, 12 LEAD, INITIAL   Result Value Ref Range    Ventricular Rate 145 BPM    QRS Duration 116 ms    Q-T Interval 320 ms    QTC Calculation (Bezet) 497 ms    Calculated R Axis -21 degrees    Calculated T Axis 169 degrees    Diagnosis       Critical Test Result: High HR  Atrial fibrillation with rapid ventricular response  Minimal voltage criteria for LVH, may be normal variant ( [place] product )  Anterior infarct (cited on or before 14-MAR-2022)  ST & T wave abnormality, consider lateral ischemia  Abnormal ECG  When compared with ECG of 14-MAR-2022 18:26,  Significant changes have occurred  Confirmed by Breana Garcia MD ([id number]) on 5/17/2022 3:35:42 PM          Assessment:   Given the patient's current clinical presentation, there is a high level of concern for decompensation if discharged from the emergency department. Complex decision making was performed, which includes reviewing the patient's available past medical records, laboratory results, and imaging studies.     Active Problems:    SOB (shortness of breath) (5/17/2022)      Acute respiratory failure with hypoxia and hypercarbia (HCC) (5/18/2022)      Plan:     #Hypoxic, Hypercapnic Respiratory Failure  #pulmonary edema, pleural effusions  #elevated probnp  #elevated troponin  -bipap, wean as tolerated  -s/p steroids, duonebs, and IV bumex  -continue diuresis with bumex, strict I&O, and daily weight  -echo ordered, and pending  -trend troponin    #atrial fibrillation  -rate controlled on diltiazem gtt, and pt. maintaining good MAP at this time  -discussion regarding r/b of anticoagulation du to hx frequent falls, and dementia  -echocardiogram   -full dose anticoag pending head CT    #stage IIIa CKD  -creatinine 1.7    #chornic dementia   Patient with chronic dementia, I am unclear of his cognitive b/l, but may need full dose anticoag  -non con CT head    DIET: DIET NPO   ISOLATION PRECAUTIONS: There are currently no Active Isolations  CODE STATUS: Full Code   DVT PROPHYLAXIS: Heparin  ANTICIPATED DISCHARGE: 24-48 hours. EMERGENCY CONTACT/SURROGATE DECISION MAKER: Dexter Fabian (spouse)    CRITICAL CARE WAS PERFORMED FOR THIS ENCOUNTER: YES. I had a face to face encounter with the patient, reviewed and interpreted patient data including clinical events, labs, images, vital signs, I/O's, and examined patient. I have discussed the case and the plan and management of the patient's care with the consulting services, the bedside nurses and necessary ancillary providers. This patient has a high probability of imminent, clinically significant deterioration, which requires the highest level of preparedness to intervene urgently. I participated in the decision-making and personally managed or directed the management of the following life and organ supporting interventions that required my frequent assessment to treat or prevent imminent deterioration. I personally spent 30 minutes of critical care time. This is time spent at this critically ill patient's bedside actively involved in patient care as well as the coordination of care and discussions with the patient's family. This does not include any procedural time which has been billed separately. Signed By: Verenice Luque MD     May 18, 2022         Please note that this dictation may have been completed with Dragon, the computer voice recognition software. Quite often unanticipated grammatical, syntax, homophones, and other interpretive errors are inadvertently transcribed by the computer software. Please disregard these errors. Please excuse any errors that have escaped final proofreading.

## 2022-05-17 NOTE — ED PROVIDER NOTES
80-year-old male with history of dementia and hypertension and CKD presents to the emergency department by EMS with chief complaint of shortness of breath. Patient is unable to provide any history. EMS gave DuoNeb for room air hypoxia which improved his SaO2. There is no reported fever or chills chest pain or shortness of breath, however the patient is unable to give any history due to dementia. I see no history of heart failure or COPD in his past chart    The history is provided by the EMS personnel and medical records. Shortness of Breath  This is a new problem. He has had prior hospitalizations. He has had prior ED visits. Associated medical issues do not include COPD or heart failure. Past Medical History:   Diagnosis Date    Dementia ([facility] [address]        No past surgical history on file. No family history on file. Social History     Socioeconomic History    Marital status:      Spouse name: Not on file    Number of children: Not on file    Years of education: Not on file    Highest education level: Not on file   Occupational History    Not on file   Tobacco Use    Smoking status: Not on file    Smokeless tobacco: Not on file   Substance and Sexual Activity    Alcohol use: Not on file    Drug use: Not on file    Sexual activity: Not on file   Other Topics Concern    Not on file   Social History Narrative    Not on file     Social Determinants of Health     Financial Resource Strain:     Difficulty of Paying Living Expenses: Not on file   Food Insecurity:     Worried About Running Out of Food in the Last Year: Not on file    Mery of Food in the Last Year: Not on file   Transportation Needs:     Lack of Transportation (Medical): Not on file    Lack of Transportation (Non-Medical):  Not on file   Physical Activity:     Days of Exercise per Week: Not on file    Minutes of Exercise per Session: Not on file   Stress:     Feeling of Stress : Not on file   Social Connections:  Frequency of Communication with Friends and Family: Not on file    Frequency of Social Gatherings with Friends and Family: Not on file    Attends Restorationist Services: Not on file    Active Member of Clubs or Organizations: Not on file    Attends Club or Organization Meetings: Not on file    Marital Status: Not on file   Intimate Partner Violence:     Fear of Current or Ex-Partner: Not on file    Emotionally Abused: Not on file    Physically Abused: Not on file    Sexually Abused: Not on file   Housing Stability:     Unable to Pay for Housing in the Last Year: Not on file    Number of [place] in the Last Year: Not on file    Unstable Housing in the Last Year: Not on file         ALLERGIES: Patient has no known allergies. Review of Systems   Unable to perform ROS: Dementia   Respiratory: Positive for shortness of breath. There were no vitals filed for this visit. Physical Exam  Vitals and nursing note reviewed. Constitutional:       General: He is not in acute distress. Appearance: He is well-developed. He is obese. He is ill-appearing. He is not toxic-appearing or diaphoretic. HENT:      Head: Normocephalic and atraumatic. Nose: Nose normal.      Mouth/Throat:      Mouth: Mucous membranes are moist.      Pharynx: Oropharynx is clear. Eyes:      Extraocular Movements: Extraocular movements intact. Conjunctiva/sclera: Conjunctivae normal.      Pupils: Pupils are equal, round, and reactive to light. Cardiovascular:      Rate and Rhythm: Regular rhythm. Tachycardia present. Pulses: Normal pulses. Heart sounds: No murmur heard. Pulmonary:      Effort: Tachypnea and respiratory distress present. Breath sounds: Decreased air movement present. Decreased breath sounds and wheezing present. Chest:      Chest wall: No mass or tenderness. Abdominal:      General: There is no distension. Palpations: Abdomen is soft. Tenderness:  There is no abdominal tenderness. There is no guarding or rebound. Musculoskeletal:         General: No swelling, tenderness, deformity or signs of injury. Normal range of motion. Cervical back: Normal range of motion and neck supple. No rigidity. No muscular tenderness. Right lower leg: No tenderness. Edema present. Left lower leg: No tenderness. Edema present. Skin:     General: Skin is warm and dry. Capillary Refill: Capillary refill takes less than 2 seconds. Neurological:      General: No focal deficit present. Mental Status: He is alert and oriented to person, place, and time. Psychiatric:         Mood and Affect: Mood normal.         Behavior: Behavior normal.          MDM  Number of Diagnoses or Management Options  Acute congestive heart failure, unspecified heart failure type ([facility]  Dementia without behavioral disturbance, unspecified dementia type [facility])  Diagnosis management comments: 27-year-old male presents as above with respiratory distress with work-up concerning for new onset heart failure. Stabilized on BiPAP in the emergency department. Will admit to the hospital for further management. Amount and/or Complexity of Data Reviewed  Clinical lab tests: reviewed  Tests in the radiology section of CPT®: reviewed  Tests in the medicine section of CPT®: reviewed  Decide to obtain previous medical records or to obtain history from someone other than the patient: yes    Risk of Complications, Morbidity, and/or Mortality  General comments: 3:41 PM  I have spent 35 minutes of critical care time involved in lab review, consultations with specialist, family decision-making, and documentation. During this entire length of time I was immediately available to the patient. Critical Care:   The reason for providing this level of medical care for this critically ill patient was due a critical illness that impaired one or more vital organ systems such that there was a high probability of imminent or life threatening deterioration in the patients condition. This care involved high complexity decision making to assess, manipulate, and support vital system functions, to treat this degreee vital organ system failure and to prevent further life threatening deterioration of the patients condition. ED Course as of 05/17/22 1338   Tue May 17, 2022   1337 ED EKG interpretation:Rhythm: Atrial fibrillation with RVR at a rate of approximately 145. Axis: normal.  ST segment: Nonspecific ST segment depressions which are likely rate related. This EKG was interpreted by Jaleel Lehman MD,ED Provider. [JM]      ED Course User Index  [JM] Allison Arceo MD       Bedside US    Date/Time: 5/17/2022 1:49 PM  Performed by: Allison Arceo MD  Authorized by: Allison Arceo MD     Emergent situation    Performed by: Attending  Type of procedure: Focused thoracic  Indications:  Dyspnea  Right anterior/ superior thorax:  Adequate  Left anterior/superior thorax:  Adequate  Lung sliding:  Present  Pleural effusion:  Absent  Lung sliding:  Present  Pleural effusion:  Absent  Bedside US    Date/Time: 5/17/2022 1:49 PM  Performed by: Allison Arceo MD  Authorized by: Allison Arceo MD     Emergent situation    Performed by: Attending  Type of procedure: Focused cardiac (Echo)  Left leg: Indications:  Dyspnea    Parasternal long axis:  Adequate    Parasternal short axis:  Adequate    Pericardial effusion:  Absent    Global Ventricular Function:  Reduced    Right Ventricular Size:  Normal    Interpretation:  Global ventricular function        Perfect Serve Consult for Admission  3:38 PM    ED Room Number: ER05/05  Patient Name and age:  Max Munguia 80 y.o.  male  Working Diagnosis:   1. Acute congestive heart failure, unspecified heart failure type ([address]    2.  Dementia without behavioral disturbance, unspecified dementia type ([address]        COVID-19 Suspicion:  no  Sepsis present:  no Reassessment needed: N/A  Code Status:  TBD, dementia patient  Readmission: no  Isolation Requirements:  no  Recommended Level of Care:  step down  Department:[facility] Adult ED - [phone]  Other:  On Bipap, new onset CHF

## 2022-05-18 ENCOUNTER — APPOINTMENT (OUTPATIENT)
Dept: CT IMAGING | Age: 87
DRG: 291 | End: 2022-05-18
Attending: FAMILY MEDICINE
Payer: MEDICARE

## 2022-05-18 PROBLEM — J96.01 ACUTE RESPIRATORY FAILURE WITH HYPOXIA AND HYPERCARBIA (HCC): Status: ACTIVE | Noted: 2022-05-18

## 2022-05-18 PROBLEM — J96.02 ACUTE RESPIRATORY FAILURE WITH HYPOXIA AND HYPERCARBIA (HCC): Status: ACTIVE | Noted: 2022-05-18

## 2022-05-18 LAB
ANION GAP SERPL CALC-SCNC: 4 MMOL/L (ref 5–15)
ARTERIAL PATENCY WRIST A: POSITIVE
ARTERIAL PATENCY WRIST A: YES
BACTERIA SPEC CULT: NORMAL
BACTERIA SPEC CULT: NORMAL
BASE DEFICIT BLD-SCNC: 0.1 MMOL/L
BASE EXCESS BLD CALC-SCNC: 1.6 MMOL/L
BASE EXCESS BLD CALC-SCNC: 3.2 MMOL/L
BASE EXCESS BLDA CALC-SCNC: 1.5 MMOL/L
BASOPHILS # BLD: 0 K/UL (ref 0–0.1)
BASOPHILS NFR BLD: 0 % (ref 0–1)
BDY SITE: ABNORMAL
BNP SERPL-MCNC: ABNORMAL PG/ML
BUN SERPL-MCNC: 37 MG/DL (ref 6–20)
BUN/CREAT SERPL: 23 (ref 12–20)
CALCIUM SERPL-MCNC: 9.5 MG/DL (ref 8.5–10.1)
CHLORIDE SERPL-SCNC: 107 MMOL/L (ref 97–108)
CO2 SERPL-SCNC: 31 MMOL/L (ref 21–32)
COMMENT, HOLDF: NORMAL
CREAT SERPL-MCNC: 1.61 MG/DL (ref 0.7–1.3)
DIFFERENTIAL METHOD BLD: ABNORMAL
EOSINOPHIL # BLD: 0 K/UL (ref 0–0.4)
EOSINOPHIL NFR BLD: 0 % (ref 0–7)
ERYTHROCYTE [DISTWIDTH] IN BLOOD BY AUTOMATED COUNT: 15.9 % (ref 11.5–14.5)
GAS FLOW.O2 O2 DELIVERY SYS: 5 L/MIN
GAS FLOW.O2 O2 DELIVERY SYS: ABNORMAL L/MIN
GLUCOSE SERPL-MCNC: 159 MG/DL (ref 65–100)
HCO3 BLD-SCNC: 27.4 MMOL/L (ref 22–26)
HCO3 BLD-SCNC: 29 MMOL/L (ref 22–26)
HCO3 BLD-SCNC: 30.6 MMOL/L (ref 22–26)
HCO3 BLDA-SCNC: 28 MMOL/L (ref 22–26)
HCT VFR BLD AUTO: 40.2 % (ref 36.6–50.3)
HGB BLD-MCNC: 11.7 G/DL (ref 12.1–17)
IMM GRANULOCYTES # BLD AUTO: 0 K/UL (ref 0–0.04)
IMM GRANULOCYTES NFR BLD AUTO: 1 % (ref 0–0.5)
LACTATE SERPL-SCNC: 1.8 MMOL/L (ref 0.4–2)
LYMPHOCYTES # BLD: 0.5 K/UL (ref 0.8–3.5)
LYMPHOCYTES NFR BLD: 11 % (ref 12–49)
MCH RBC QN AUTO: 31.1 PG (ref 26–34)
MCHC RBC AUTO-ENTMCNC: 29.1 G/DL (ref 30–36.5)
MCV RBC AUTO: 106.9 FL (ref 80–99)
MONOCYTES # BLD: 0.5 K/UL (ref 0–1)
MONOCYTES NFR BLD: 10 % (ref 5–13)
NEUTS SEG # BLD: 3.5 K/UL (ref 1.8–8)
NEUTS SEG NFR BLD: 78 % (ref 32–75)
NRBC # BLD: 0 K/UL (ref 0–0.01)
NRBC BLD-RTO: 0 PER 100 WBC
O2/TOTAL GAS SETTING VFR VENT: 3 %
O2/TOTAL GAS SETTING VFR VENT: 40 %
O2/TOTAL GAS SETTING VFR VENT: 6 %
PCO2 BLD: 55.8 MMHG (ref 35–45)
PCO2 BLD: 57.9 MMHG (ref 35–45)
PCO2 BLD: 59.1 MMHG (ref 35–45)
PCO2 BLDA: 50 MMHG (ref 35–45)
PEEP RESPIRATORY: 10 CMH2O
PH BLD: 7.3 [PH] (ref 7.35–7.45)
PH BLD: 7.31 [PH] (ref 7.35–7.45)
PH BLD: 7.32 [PH] (ref 7.35–7.45)
PH BLDA: 7.36 [PH] (ref 7.35–7.45)
PLATELET # BLD AUTO: 199 K/UL (ref 150–400)
PMV BLD AUTO: 11.1 FL (ref 8.9–12.9)
PO2 BLD: 127 MMHG (ref 80–100)
PO2 BLD: 60 MMHG (ref 80–100)
PO2 BLD: 92 MMHG (ref 80–100)
PO2 BLDA: 86 MMHG (ref 80–100)
POTASSIUM SERPL-SCNC: 4.4 MMOL/L (ref 3.5–5.1)
PROCALCITONIN SERPL-MCNC: 0.05 NG/ML
RBC # BLD AUTO: 3.76 M/UL (ref 4.1–5.7)
RBC MORPH BLD: ABNORMAL
SAMPLES BEING HELD,HOLD: NORMAL
SAO2 % BLD: 87.9 % (ref 92–97)
SAO2 % BLD: 96 % (ref 92–97)
SAO2 % BLD: 96 % (ref 92–97)
SAO2 % BLD: 98.4 % (ref 92–97)
SAO2% DEVICE SAO2% SENSOR NAME: ABNORMAL
SERVICE CMNT-IMP: NORMAL
SODIUM SERPL-SCNC: 142 MMOL/L (ref 136–145)
SPECIMEN SITE: ABNORMAL
SPECIMEN TYPE: ABNORMAL
TSH SERPL DL<=0.05 MIU/L-ACNC: 0.8 UIU/ML (ref 0.36–3.74)
WBC # BLD AUTO: 4.5 K/UL (ref 4.1–11.1)

## 2022-05-18 PROCEDURE — 74011000250 HC RX REV CODE- 250: Performed by: FAMILY MEDICINE

## 2022-05-18 PROCEDURE — 65620000000 HC RM CCU GENERAL

## 2022-05-18 PROCEDURE — 36600 WITHDRAWAL OF ARTERIAL BLOOD: CPT

## 2022-05-18 PROCEDURE — 83605 ASSAY OF LACTIC ACID: CPT

## 2022-05-18 PROCEDURE — 74011250636 HC RX REV CODE- 250/636: Performed by: FAMILY MEDICINE

## 2022-05-18 PROCEDURE — 94660 CPAP INITIATION&MGMT: CPT

## 2022-05-18 PROCEDURE — 97530 THERAPEUTIC ACTIVITIES: CPT

## 2022-05-18 PROCEDURE — 85025 COMPLETE CBC W/AUTO DIFF WBC: CPT

## 2022-05-18 PROCEDURE — 97165 OT EVAL LOW COMPLEX 30 MIN: CPT

## 2022-05-18 PROCEDURE — 82803 BLOOD GASES ANY COMBINATION: CPT

## 2022-05-18 PROCEDURE — 94762 N-INVAS EAR/PLS OXIMTRY CONT: CPT

## 2022-05-18 PROCEDURE — 80048 BASIC METABOLIC PNL TOTAL CA: CPT

## 2022-05-18 PROCEDURE — 84145 PROCALCITONIN (PCT): CPT

## 2022-05-18 PROCEDURE — 97535 SELF CARE MNGMENT TRAINING: CPT

## 2022-05-18 PROCEDURE — 92610 EVALUATE SWALLOWING FUNCTION: CPT | Performed by: SPEECH-LANGUAGE PATHOLOGIST

## 2022-05-18 PROCEDURE — 97161 PT EVAL LOW COMPLEX 20 MIN: CPT

## 2022-05-18 PROCEDURE — 36415 COLL VENOUS BLD VENIPUNCTURE: CPT

## 2022-05-18 PROCEDURE — 74011250637 HC RX REV CODE- 250/637: Performed by: NURSE PRACTITIONER

## 2022-05-18 PROCEDURE — 99221 1ST HOSP IP/OBS SF/LOW 40: CPT | Performed by: SPECIALIST

## 2022-05-18 PROCEDURE — 74011250637 HC RX REV CODE- 250/637: Performed by: STUDENT IN AN ORGANIZED HEALTH CARE EDUCATION/TRAINING PROGRAM

## 2022-05-18 PROCEDURE — 70450 CT HEAD/BRAIN W/O DYE: CPT

## 2022-05-18 PROCEDURE — 83880 ASSAY OF NATRIURETIC PEPTIDE: CPT

## 2022-05-18 PROCEDURE — 74011000258 HC RX REV CODE- 258: Performed by: FAMILY MEDICINE

## 2022-05-18 RX ORDER — ENOXAPARIN SODIUM 100 MG/ML
30 INJECTION SUBCUTANEOUS DAILY
Status: DISCONTINUED | OUTPATIENT
Start: 2022-05-18 | End: 2022-05-24 | Stop reason: HOSPADM

## 2022-05-18 RX ORDER — FUROSEMIDE 40 MG/1
40 TABLET ORAL DAILY
COMMUNITY
End: 2022-05-24

## 2022-05-18 RX ORDER — ACETAMINOPHEN 325 MG/1
650 TABLET ORAL
Status: DISCONTINUED | OUTPATIENT
Start: 2022-05-18 | End: 2022-05-24 | Stop reason: HOSPADM

## 2022-05-18 RX ORDER — ONDANSETRON 4 MG/1
4 TABLET, ORALLY DISINTEGRATING ORAL
Status: DISCONTINUED | OUTPATIENT
Start: 2022-05-18 | End: 2022-05-24 | Stop reason: HOSPADM

## 2022-05-18 RX ORDER — ONDANSETRON 2 MG/ML
4 INJECTION INTRAMUSCULAR; INTRAVENOUS
Status: DISCONTINUED | OUTPATIENT
Start: 2022-05-18 | End: 2022-05-24 | Stop reason: HOSPADM

## 2022-05-18 RX ORDER — SODIUM CHLORIDE 0.9 % (FLUSH) 0.9 %
5-40 SYRINGE (ML) INJECTION EVERY 8 HOURS
Status: DISCONTINUED | OUTPATIENT
Start: 2022-05-18 | End: 2022-05-24 | Stop reason: HOSPADM

## 2022-05-18 RX ORDER — MIRTAZAPINE 15 MG/1
7.5 TABLET, FILM COATED ORAL
Status: DISCONTINUED | OUTPATIENT
Start: 2022-05-18 | End: 2022-05-24 | Stop reason: HOSPADM

## 2022-05-18 RX ORDER — ISOSORBIDE MONONITRATE 30 MG/1
30 TABLET, EXTENDED RELEASE ORAL
Status: DISCONTINUED | OUTPATIENT
Start: 2022-05-19 | End: 2022-05-24 | Stop reason: HOSPADM

## 2022-05-18 RX ORDER — LORAZEPAM 1 MG/1
0.5 TABLET ORAL ONCE
Status: COMPLETED | OUTPATIENT
Start: 2022-05-18 | End: 2022-05-18

## 2022-05-18 RX ORDER — ACETAMINOPHEN 325 MG/1
650 TABLET ORAL 3 TIMES DAILY
COMMUNITY
End: 2022-05-24

## 2022-05-18 RX ORDER — DULAGLUTIDE 3 MG/.5ML
0.5 INJECTION, SOLUTION SUBCUTANEOUS
COMMUNITY

## 2022-05-18 RX ORDER — IPRATROPIUM BROMIDE AND ALBUTEROL SULFATE 2.5; .5 MG/3ML; MG/3ML
3 SOLUTION RESPIRATORY (INHALATION)
Status: DISCONTINUED | OUTPATIENT
Start: 2022-05-18 | End: 2022-05-24 | Stop reason: HOSPADM

## 2022-05-18 RX ORDER — ACETAMINOPHEN 325 MG/1
650 TABLET ORAL
COMMUNITY

## 2022-05-18 RX ORDER — GUAIFENESIN 100 MG/5ML
81 LIQUID (ML) ORAL DAILY
Status: DISCONTINUED | OUTPATIENT
Start: 2022-05-19 | End: 2022-05-24 | Stop reason: HOSPADM

## 2022-05-18 RX ORDER — BUMETANIDE 0.25 MG/ML
1 INJECTION INTRAMUSCULAR; INTRAVENOUS EVERY 12 HOURS
Status: DISCONTINUED | OUTPATIENT
Start: 2022-05-18 | End: 2022-05-23

## 2022-05-18 RX ORDER — ISOSORBIDE MONONITRATE 30 MG/1
30 TABLET, EXTENDED RELEASE ORAL
COMMUNITY
End: 2022-05-24

## 2022-05-18 RX ORDER — SODIUM CHLORIDE 0.9 % (FLUSH) 0.9 %
5-40 SYRINGE (ML) INJECTION AS NEEDED
Status: DISCONTINUED | OUTPATIENT
Start: 2022-05-18 | End: 2022-05-24 | Stop reason: HOSPADM

## 2022-05-18 RX ORDER — ACETAMINOPHEN 650 MG/1
650 SUPPOSITORY RECTAL
Status: DISCONTINUED | OUTPATIENT
Start: 2022-05-18 | End: 2022-05-24 | Stop reason: HOSPADM

## 2022-05-18 RX ORDER — ALBUTEROL SULFATE 90 UG/1
2 AEROSOL, METERED RESPIRATORY (INHALATION)
COMMUNITY

## 2022-05-18 RX ADMIN — DILTIAZEM HYDROCHLORIDE 5 MG/HR: 5 INJECTION, SOLUTION INTRAVENOUS at 09:46

## 2022-05-18 RX ADMIN — BUMETANIDE 1 MG: 0.25 INJECTION INTRAMUSCULAR; INTRAVENOUS at 09:44

## 2022-05-18 RX ADMIN — MIRTAZAPINE 7.5 MG: 15 TABLET, FILM COATED ORAL at 21:32

## 2022-05-18 RX ADMIN — DILTIAZEM HYDROCHLORIDE 5 MG/HR: 5 INJECTION, SOLUTION INTRAVENOUS at 15:35

## 2022-05-18 RX ADMIN — SODIUM CHLORIDE, PRESERVATIVE FREE 10 ML: 5 INJECTION INTRAVENOUS at 09:45

## 2022-05-18 RX ADMIN — BUMETANIDE 1 MG: 0.25 INJECTION INTRAMUSCULAR; INTRAVENOUS at 21:32

## 2022-05-18 RX ADMIN — LORAZEPAM 0.5 MG: 1 TABLET ORAL at 21:32

## 2022-05-18 RX ADMIN — SODIUM CHLORIDE, PRESERVATIVE FREE 10 ML: 5 INJECTION INTRAVENOUS at 21:36

## 2022-05-18 RX ADMIN — ENOXAPARIN SODIUM 30 MG: 100 INJECTION SUBCUTANEOUS at 09:44

## 2022-05-18 NOTE — PROGRESS NOTES
Problem: Mobility Impaired (Adult and Pediatric)  Goal: *Acute Goals and Plan of Care (Insert Text)  Description: FUNCTIONAL STATUS PRIOR TO ADMISSION: Patient ambulated with a rolling walker. HOME SUPPORT PRIOR TO ADMISSION: The patient lived in a LTC facility. Physical Therapy Goals  Initiated 5/18/2022  1. Patient will move from supine to sit and sit to supine , scoot up and down, and roll side to side in bed with supervision/set-up within 7 day(s). 2.  Patient will transfer from bed to chair and chair to bed with supervision/set-up using the least restrictive device within 7 day(s). 3.  Patient will perform sit to stand with supervision/set-up within 7 day(s). 4.  Patient will ambulate with supervision/set-up for 150 feet with the least restrictive device within 7 day(s). Outcome: Not Met     PHYSICAL THERAPY EVALUATION  Patient: Sully Norman (19 y.o. male)  Date: 5/18/2022  Primary Diagnosis: SOB (shortness of breath) [R06.02]  Acute respiratory failure with hypoxia and hypercarbia (HCC) [J96.01, J96.02]        Precautions:  Fall    ASSESSMENT  Based on the objective data described below, the patient presents with generalized weakness, impaired balance, impaired  pulmonary status and decreased activity tolerance s/p admission for resp failure with hypoxia and hypercarbia. Patient has h/o dementia, resides in a memory care unit at his LTC facility. Received pt in bed on 4L NCO2, SpO2 in the 90's throughout this session. Pt is alert & oriented to person, hosp and month & year. He demonstrates good follow through of commands. He is Min Mod A for functional mobility using the RW (gait assessment limited to a few side steps today). Pt endorses ambulating at the facility with a rolling walker and being fairly independent. There was no family in the room to verify information provided by patient. Therapy will follow.       Current Level of Function Impacting Discharge (mobility/balance): Mod A supine<->sit w/ HOB raised; Up to Mod A sit<->stand; Min A ambulating side steps with RW    Functional Outcome Measure: Other factors to consider for discharge: from [place] [address], h/o dementia (pt provided PLOF history, unknown if accurate)     Patient will benefit from skilled therapy intervention to address the above noted impairments. PLAN :  Recommendations and Planned Interventions: bed mobility training, transfer training, gait training, therapeutic exercises, neuromuscular re-education, patient and family training/education and therapeutic activities      Frequency/Duration: Patient will be followed by physical therapy:  3 times a week to address goals. Recommendation for discharge: (in order for the patient to meet his/her long term goals)  To be determined: Anticipate return to [place] [address] with physical therapy vs none pending progress and verfication of PLOF. This discharge recommendation:  Has been made in collaboration with the attending provider and/or case management    IF patient discharges home will need the following DME: none anticipated         SUBJECTIVE:   Patient stated I'm wet.     OBJECTIVE DATA SUMMARY:   HISTORY:    Past Medical History:   Diagnosis Date    Dementia ([facility] [address]    No past surgical history on file. Personal factors and/or comorbidities impacting plan of care: [facility]    Home Situation  Home Environment: Long term care (memory )  One/Two Story Residence: One story  Living Alone: No  Support Systems: [facility]  [facility]  Current DME Used/Available at Home: Walker, rolling    EXAMINATION/PRESENTATION/DECISION MAKING:   Critical Behavior:  Neurologic State: Alert  Orientation Level: Oriented to person,Oriented to place,Oriented to situation (oriented to [employer] and year)  Cognition: Follows commands     Hearing:   Auditory  Auditory Impairment: Hard of hearing, bilateral  Skin:  Exposed areas grossly intact  Edema: LLE  Range Of Motion:  AROM: Generally decreased, functional                       Strength:    Strength: Generally decreased, functional                    Tone & Sensation:   Tone: Normal                              Coordination:  Coordination: Within functional limits  Vision:      Functional Mobility:  Bed Mobility:  Supine to Sit: Moderate assistance; Additional time;Assist x1;Bed Modified  Sit to Supine: Moderate assistance;Assist x1;Additional time  Scooting: Stand-by assistance; Additional time; Other (comment) (verbal cues)   Tx: verbal and tactile cues for hooklying for bridged scooting with good pt follow through. Verbal cues for technique/ hand placement supine to sit with manual assist for LE management to  EOB and trunk upright. Assist for LEs on return to bed. Transfers:  Sit to Stand: Moderate assistance (from elvated bed height)  Stand to Sit: Minimum assistance  Tx: verbal cues for hand placement to push up and control descent. Balance:   Sitting: Intact; Without support  Standing: Impaired; Without support  Standing - Static: Constant support  Standing - Dynamic : Constant support  Ambulation/Gait Training:  Gait Description (WDL): Exceptions to WDL  Rolling walker, Min A for walker management when side stepping  Ambulated a few side steps, forward flexed trunk, decreased step clearance, assist needed for walker management at times.         Physical Therapy Evaluation Charge Determination   History Examination Presentation Decision-Making   MEDIUM  Complexity : 1-2 comorbidities / personal factors will impact the outcome/ POC  LOW Complexity : 1-2 Standardized tests and measures addressing body structure, function, activity limitation and / or participation in recreation  LOW Complexity : Stable, uncomplicated        Based on the above components, the patient evaluation is determined to be of the following complexity level: LOW     Pain Rating:  None indicated    Activity Tolerance:   Fair and SpO2 stable on 4L, rest breaks required    After treatment patient left in no apparent distress:   Supine in bed, Call bell within reach and Side rails x 3    COMMUNICATION/EDUCATION:   The patients plan of care was discussed with: Physical therapist and Registered nurse. Fall prevention education was provided and the patient/caregiver indicated understanding., Patient/family have participated as able in goal setting and plan of care. and Patient/family agree to work toward stated goals and plan of care.     Thank you for this referral.  Shaun Jorge, PT   Time Calculation: 30 mins

## 2022-05-18 NOTE — ED NOTES
Patient resting in bed at this time. Patient is on 4 liters nasal cannula. And oxygen saturation is 100%.

## 2022-05-18 NOTE — PROGRESS NOTES
Palliative Medicine      Code Status: Full Code    Advance Care Planning:  Advance Care Planning 3/14/2022   Confirm Advance Directive None, spouse is not aware of any MPOA paperwork     The SW inquired about MPOA paperwork/AMD- patient's spouse is not aware of patient having any paperwork. Patient's spouse is Eusebio Gentleman. Patient / Family Encounter Documentation    Participants (names): Cecy Rivera, spouse     Narrative: The Palliative Medicine SW met with the patient at bedside, no family present- he is awake and alert, does not appear in distress. Will contact patient's family and offer meeting to further discuss care goals. The Palliative Medicine SW called the patient's spouse Cecy Rivera in order to introduce the role of Palliative Medicine, offer support. Mrs. Celis received update from hospital MD regarding current medical status. Mrs. Rowan Aguila provided history on the patient- he resides in LTC, has lived there over a year. See Psychosocial History below for additional information. SW discussed having family meeting tomorrow, 5/19, to further discuss the patient's care. Mrs. Rowan Aguila shares that she has difficulty with her legs- difficult for her to get to the hospital. SW offered  meeting via phone- family meeting scheduled for tomorrow, 5/19 at 12:00 p.m. via phone with patient's spouse. She will inform her son Lucille Blunt (patient's step son), she shares he is a support to her. Spouse would like to meet via phone, plan made for call with spouse at 12:00 p.m. tomorrow- she will also inform Eagle (patient's step son) and notify him of meeting time so we can add him to call as well. Psychosocial Issues Identified/ Resilience Factors: The patient resides at the [address] [place] [address] in [address] [place] [address], spouse reports that patient has been in facility for over a year. The patient and his wife have been  for 30 years.  They do not have any biological children together- blended family, the patient has a stepson Eagle, and spouse reports she thinks he has 6 children. The patient is an [employer]- spent two years in the [employer]. 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 Caregiver [place]:   Does the caregiver feel confident administering medication? Facility staff administers medications   Does the caregiver need any help connecting with community resources? None identified   Does the caregiver feel confident assisting with activities of daily living? Patient resides in LTC at [address] [place] [address]- has been there approximately 1 year     Goals of Care / Plan: Full Code, plan to have phone meeting w/ spouse tomorrow at 12:00 p.m.-  She is also going to notify the patient's [employer], he may join us as well. Family meeting w/ spouse via phone (per her request) tomorrow at 12:00 p.m. FULL CODE, restorative care.      Thank you for including Palliative Medicine in the care of Delio [address] [place] [address]  [phone]

## 2022-05-18 NOTE — PROGRESS NOTES
Problem: Self Care Deficits Care Plan (Adult)  Goal: *Acute Goals and Plan of Care (Insert Text)  Description: FUNCTIONAL STATUS PRIOR TO ADMISSION: Patient unable to provide background information - reports being able to dress and toilet himself and needing only minimal assistance for bathing, however, chart review reporting patient needing significant assist with all ADLs. Patient in LTC memory care unit at baseline. Wife is involved in his care. Occupational Therapy Goals  Initiated 5/18/2022  1. Patient will perform 2 grooming tasks with moderate assistance within 7 day(s). 2.  Patient will perform anterior neck to thigh bathing with moderate assistance  within 7 day(s). 3.  Patient will perform toilet transfers to/from [facility] with moderate assistance within 7 day(s). 4.  Patient will perform all aspects of toileting with moderate assistance within 7 day(s). Outcome: Not Met    OCCUPATIONAL THERAPY EVALUATION  Patient: Darlin Long (78 y.o. male)  Date: 5/18/2022  Primary Diagnosis: SOB (shortness of breath) [R06.02]  Acute respiratory failure with hypoxia and hypercarbia (HCC) [J96.01, J96.02]        Precautions:  Fall,Skin    ASSESSMENT  Based on the objective data described below, the patient presents near baseline with regards to ADLs s/p admission for SOB and acute respiratory failure. Patient ADLs limited by impaired balance, generalized weakness, decreased functional activity tolerance, limited lower body access, baseline dementia and impaired cognition. Patient in LTC at baseline and requires assist with ADLs. Patient received in supine and agreeable to therapy. Patient required mod-max A to come to sitting EOB and min-mod A to stand using RW. Patient male external catheter not secured well and bed sheets/chux and gown saturated in urine. Patient required total A to change gown, sheets and complete new care. Patient returned to semisupine with max A and left with call bell in reach.  Noted patient on 4L NC, however, pulse ox did not have a great signal throughout session - the few times it had a good pleth, the SPO2 read 93%. Will continue to follow, recommend return to LTC. Current Level of Function Impacting Discharge (ADLs/self-care): up to max A for ADLs    Functional Outcome Measure: The patient scored Total: 20/100 on the Barthel Index outcome measure which is indicative of 80% impairment in basic self-care. Other factors to consider for discharge: none     Patient will benefit from skilled therapy intervention to address the above noted impairments. PLAN :  Recommendations and Planned Interventions: self care training, functional mobility training, therapeutic exercise, balance training, therapeutic activities, endurance activities, patient education, home safety training, and family training/education    Frequency/Duration: Patient will be followed by occupational therapy 2 times a week to address goals. Recommendation for discharge: (in order for the patient to meet his/her long term goals)  To be determined: likely return to SNF    This discharge recommendation:  Has not yet been discussed the attending provider and/or case management    IF patient discharges home will need the following DME: patient owns DME required for discharge       SUBJECTIVE:   Patient stated I usually do that with just a little help.  re: bathing    OBJECTIVE DATA SUMMARY:   HISTORY:   Past Medical History:   Diagnosis Date    Dementia ([facility] [address]    No past surgical history on file.     Expanded or extensive additional review of patient history:     Home Situation  Home Environment: Long term care (Memory Care)  One/Two Story Residence: One story  Living Alone: No  Support Systems: [facility]  Patient Expects to be Discharged to[de-identified] [address]  Current DME Used/Available at Home: Eva Samano, kimmie,Shower chair,Grab bars  Tub or Shower Type: Shower    Hand dominance: Right    EXAMINATION OF PERFORMANCE DEFICITS:  Cognitive/Behavioral Status:  Neurologic State: Alert  Orientation Level: Oriented to person;Oriented to place;Oriented to situation (able to recall month and year)  Cognition: Follows commands  Perception: Appears intact  Perseveration: No perseveration noted  Safety/Judgement: Awareness of environment;Decreased insight into deficits; Fall prevention    Skin: appears grossly intact    Edema: none noted in BUEs    Hearing: Auditory  Auditory Impairment: Hard of hearing, bilateral    Vision/Perceptual:    Tracking: Requires cues, head turns, or add eye shifts to track                      Acuity: Impaired near vision; Impaired far vision         Range of Motion:  In BUEs  AROM: Generally decreased, functional    Strength: In BUEs  Strength: Generally decreased, functional    Coordination:  Coordination: Generally decreased, functional  Fine Motor Skills-Upper: Left Impaired;Right Impaired    Gross Motor Skills-Upper: Left Impaired;Right Impaired    Tone & Sensation:  In BUEs  Tone: Normal  Sensation:  (unable to formally assess)     Balance:  Sitting: Intact  Standing: Impaired; With support  Standing - Static: Constant support; Fair  Standing - Dynamic : Constant support; Fair    Functional Mobility and Transfers for ADLs:  Bed Mobility:  Supine to Sit: Moderate assistance; Additional time  Sit to Supine: Moderate assistance; Additional time  Scooting: Stand-by assistance; Additional time    Transfers:  Sit to Stand: Moderate assistance  Stand to Sit: Minimum assistance    ADL Assessment:  Feeding: Moderate assistance    Oral Facial Hygiene/Grooming: Moderate assistance    Bathing: Maximum assistance    Upper Body Dressing: Moderate assistance    Lower Body Dressing: Maximum assistance    Toileting: Maximum assistance    ADL Intervention and task modifications:    Lower Body Dressing Assistance  Socks:  Total assistance (dependent)  Leg Crossed Method Used: No  Position Performed: Seated edge of bed  Cues: Don;Physical assistance;Verbal cues provided    Toileting  Toileting Assistance: Total assistance(dependent)  Bladder Hygiene: Total assistance (dependent)  Bowel Hygiene: Total assistance (dependent)  Clothing Management: Total assistance (dependent)  Cues: Physical assistance for pants down;Physical assistance for pants up; Tactile cues provided;Verbal cues provided    Cognitive Retraining  Safety/Judgement: Awareness of environment;Decreased insight into deficits; Fall prevention    Functional Measure:    Barthel Index:  Bathin  Bladder: 5  Bowels: 5  Groomin  Dressin  Feedin  Mobility: 0  Stairs: 0  Toilet Use: 0  Transfer (Bed to Chair and Back): 5  Total: 20/100      The Barthel ADL Index: Guidelines  1. The index should be used as a record of what a patient does, not as a record of what a patient could do. 2. The main aim is to establish degree of independence from any help, physical or verbal, however minor and for whatever reason. 3. The need for supervision renders the patient not independent. 4. A patient's performance should be established using the best available evidence. Asking the patient, friends/relatives and nurses are the usual sources, but direct observation and common sense are also important. However direct testing is not needed. 5. Usually the patient's performance over the preceding 24-48 hours is important, but occasionally longer periods will be relevant. 6. Middle categories imply that the patient supplies over 50 per cent of the effort. 7. Use of aids to be independent is allowed. Score Interpretation (from [address] [address])    Independent   60-79 Minimally independent   40-59 Partially dependent   20-39 Very dependent   <20 Totally dependent     -Emily Lopes, Barthel, D.W. (1965). Functional evaluation: the Barthel Index. [facility] ([address] (1997).  The Barthel activities of daily living index: self-reporting versus actual performance in the old (> or = 75 years). Journal of [address] [place] [address] 45(6), [facility], MANOHAR, Jan Gambino., Kristen Lion (1999). Measuring the change in disability after inpatient rehabilitation; comparison of the responsiveness of the Barthel Index and Functional [place] Measure. Journal of Neurology, Neurosurgery, and Psychiatry, 66(4), 375-330. JANE Heck, PATY Caballero, & Sidney Healy M.A. (2004) Assessment of post-stroke quality of life in cost-effectiveness studies: The usefulness of the Barthel Index and the EuroQoL-5D. Quality of Life Research, 15, 186-33     Occupational Therapy Evaluation Charge Determination   History Examination Decision-Making   LOW Complexity : Brief history review  MEDIUM Complexity : 3-5 performance deficits relating to physical, cognitive , or psychosocial skils that result in activity limitations and / or participation restrictions HIGH Complexity : Patient presents with comorbidities that affect occupational performance. Signifigant modification of tasks or assistance (eg, physical or verbal) with assessment (s) is necessary to enable patient to complete evaluation       Based on the above components, the patient evaluation is determined to be of the following complexity level: MEDIUM  Pain Rating:  Reporting no pain    Activity Tolerance:   Fair, desaturates with exertion and requires oxygen, and requires rest breaks    After treatment patient left in no apparent distress:    Supine in bed, Call bell within reach, Side rails x 3, and RN aware    COMMUNICATION/EDUCATION:   The patients plan of care was discussed with: Physical therapist, Speech therapist, and Registered nurse. Home safety education was provided and the patient/caregiver indicated understanding. and Patient/family have participated as able in goal setting and plan of care.     This patients plan of care is appropriate for delegation to REYES.    Thank you for this referral.  Josi Ser, OT  Time Calculation: 29 mins

## 2022-05-18 NOTE — PROGRESS NOTES
Problem: Dysphagia (Adult)  Goal: *Acute Goals and Plan of Care (Insert Text)  Description: Speech Therapy Goals  Initiated 5/18/2022  1. Patient will tolerate full liquid diet free of overt s/s aspiration or adverse response within 7 days. 5/18/2022 1202 by Dc Salazar SLP  Outcome: Not Met     SPEECH [address] [place]  EVALUATION  Patient: Chris Pedraza (71 y.o. male)  Date: 5/18/2022  Primary Diagnosis: SOB (shortness of breath) [R06.02]  Acute respiratory failure with hypoxia and hypercarbia (HCC) [J96.01, J96.02]        Precautions: Aspiration  Fall    ASSESSMENT :  Based on the objective data described below, the patient presents with moderate oral dysphagia characterized by prolonged oral manipulation and transit and multiple swallows (2-3 swallows) with pureed trials. Patient tolerated all PO trials without overt s/s aspiration and stable RR SpO2. Patient declined further PO trials after 2 bites of puree and 4 straw sips of thin liquids. Risk of aspiration is high given respiratory status and drowsiness. Given bedside presentation feel patient is safe for small amounts of purees and thin liquids with precautions listed below. Recommend hold PO if patient poorly alert, coughing with PO or decline in respiratory status. Patient will benefit from skilled intervention to address the above impairments. Patients rehabilitation potential is considered to be Fair     PLAN :  Recommendations and Planned Interventions:  Full liquid diet  Sit up for all PO  Must be awake and alert  Small bites  Small, single sips  Medication as tolerated (whole or crushed in puree or one at a time with water)  Hold PO if not alert, coughing with PO or decline in respiratory status  Frequency/Duration: Patient will be followed by speech-language pathology 3 times a week to address goals. Discharge Recommendations: To Be Determined     SUBJECTIVE:   Patient stated That's all I want for now. .  4 L via NC.  SPO2 99%, RR 18 however patient with mildly labored mouth breathing. OBJECTIVE:     Past Medical History:   Diagnosis Date    Dementia ([address]    No past surgical history on file. Prior Level of Function/Home Situation:   Home Situation  Home Environment: Long term care (memory )  One/Two Story Residence: One story  Living Alone: No  Support Systems: [facility]  Patient Expects to be Discharged to[de-identified] [facility]  Current DME Used/Available at Home: Walker, rolling  Diet prior to admission: Soft and bite sized on previous admission  Current Diet:  NPO   Cognitive and Communication Status:  Neurologic State: Alert  Orientation Level: Oriented to person,Oriented to place,Oriented to situation  Cognition: Decreased attention/concentration,Follows commands           Oral Assessment:  Oral Assessment  Labial: No impairment  Dentition: Edentulous  Oral Hygiene: Mildly pooled secretions  Lingual: Decreased rate  Velum: Unable to visualize  Mandible: No impairment  P.O. Trials:  Patient Position: Clabe Chrisock in bed  Vocal quality prior to P.O.: Low volume  Consistency Presented: Ice chips;Puree; Thin liquid  How Presented: SLP-fed/presented;Spoon;Straw     Bolus Acceptance: No impairment  Bolus Formation/Control: Impaired  Type of Impairment: Delayed  Propulsion: Delayed (# of seconds); Discoordination  Oral Residue: None  Initiation of Swallow: Delayed (# of seconds)  Laryngeal Elevation: Functional  Aspiration Signs/Symptoms: None  Pharyngeal Phase Characteristics: Double swallow  Effective Modifications: Small sips and bites          Oral Phase Severity: Moderate  Pharyngeal Phase Severity : Mild    NOMS:   The NOMS functional outcome measure was used to quantify this patient's level of swallowing impairment.   Based on the NOMS, the patient was determined to be at level 3 for swallow function       NOMS Swallowing Levels:  Level 1 (CN): NPO  Level 2 (CM): NPO but takes consistency in therapy  Level 3 (CL): Takes less than 50% of nutrition p.o. and continues with nonoral feedings; and/or safe with mod cues; and/or max diet restriction  Level 4 (CK): Safe swallow but needs mod cues; and/or mod diet restriction; and/or still requires some nonoral feeding/supplements  Level 5 (CJ): Safe swallow with min diet restriction; and/or needs min cues  Level 6 (CI): Independent with p.o.; rare cues; usually self cues; may need to avoid some foods or needs extra time  Level 7 ([address] [place] [address]): Independent for all p.o.  MARLI. (2003). National Outcomes Measurement System (NOMS): Adult Speech-Language Pathology User's Guide. After treatment:   Patient left in no apparent distress in bed and Call bell within reach    COMMUNICATION/EDUCATION:   Patient was educated regarding role of SLP, PO diet consistency and POC. Patient nodded but closing his eyes also. The patient's plan of care including recommendations, planned interventions, and recommended diet changes were discussed with:    Patient/family have participated as able in goal setting and plan of care. Patient/family agree to work toward stated goals and plan of care.     Thank you for this referral.  LIA Castellano  Time Calculation: 15 mins

## 2022-05-18 NOTE — NURSE NAVIGATOR
Chart reviewed by Heart Failure Nurse Navigator. Heart Failure database completed. EF:  Pending; previous EF 50-55% (echo dated 1/5/22)     ACEi/ARB/ARNi: currently not indicated    BB: currently not indicated    Aldosterone Antagonist: currently not indicated    Obstructive Sleep Apnea Screening:   Referred to Sleep Medicine: NO; screening priority 4/ advanced age    CRT currently not indicated. NYHA Functional Class not assigned. Documentation requested     Heart Failure Teach Back in Patient Education. Heart Failure Avoiding Triggers on Discharge Instructions. Cardiologist: CAV      Post discharge follow up phone call to be made within 48-72 hours of discharge.

## 2022-05-18 NOTE — PROGRESS NOTES
[facility] Adult  Hospitalist Group                                                                                          Hospitalist Progress Note  Yaritza Sheikh MD  Answering service: [phone] from in house phone        Date of Service:  2022  NAME:  Sully Norman  :  1931  MRN:  [id number]      Admission Summary:   Sully Norman is a 80 y.o. male with a pmhx hypertension, CKD, DM 2, dysphagia, falls, and dementia who presented via EMS for hypoxia, and tachycardia. Per chart record, heart rate was in the 150s, and he was hypoxic to 83%. Obtained from chart review due to patient with tachypnea, and inability to complete full sentences during my evaluation. He was previously hospitalized from 3/14/2022 to 3/18/2022 for LAMINE with hyperkalemia     In the ED, he was tachycardic to the 130s, with recorded SPO2 97 to 100% on BiPAP. Labs are significant for troponin 109, probnp 31,490,  PH 7.29, PCO2 58.5, and PO2 69. Echocardiogram was done, and showed depressed EF 30 to 50%. Chest x-ray significant for moderate pulmonary edema with bilateral pleural effusions. EKG shows atrial fibrillation with  with non-specific ST-T wave changes.     In the ED, he received duo nebs, 2 mg IV Bumex, 125 IV Solu-Medrol, and was placed on BiPAP. Between the time of my chart review, and evaluating the patient he had taken his BiPAP off due to not tolerating the mask. Nursing staff tried a nasal mask without success. Stat ABG ordered, and PCO2 was worsening so had a discussion with the patient, provided reassurance, treated anxiety with Seroquel, and reattempted Pap therapy. He did tolerate at this time, and ABG is improving, so will admit to [facility] at this time. Interval history / Subjective:   Patient seen and examined earlier this morning by me for follow-up of hypoxic respiratory failure and A. fib with RVR.   Initially on first evaluation patient was still very somnolent. Repeat ABG showed a little bit of improvement. Later in the day patient became much more awake and alert. Assessment & Plan:     #Hypoxic, Hypercapnic Respiratory Failure  #pulmonary edema, pleural effusions  #elevated probnp  #elevated troponin  -bipap, wean as tolerated  -s/p steroids, duonebs, and IV bumex  -continue diuresis with bumex, strict I&O, and daily weight  -echo ordered, and pending  -trend troponin    Patient improved on BiPAP did not tolerate it very long and his mental status became better he tore it off. He was placed on nasal cannula. Still somnolent in the morning but showed improvement throughout the day. We will recheck ABG. May need to be put back on BiPAP if ABG worsens. If he needs to be put back on a BiPAP and does not tolerated he may need to be intubated. At this time he is looking improved but needs to be managed closely. Continue Bumex and current treatments.     #atrial fibrillation  -rate controlled on diltiazem gtt, and pt. maintaining good MAP at this time  -discussion regarding r/b of anticoagulation du to hx frequent falls, and dementia  -echocardiogram   -full dose anticoag pending head CT  Cardiology on board and has seen     #stage IIIa CKD  -creatinine 1.7     #chornic dementia   Patient with chronic dementia, I am unclear of his cognitive b/l, but may need full dose anticoag  -non con CT head    I spoke to the patient's wife about the patient's current status. I was informed that there were previous recommendations for palliative. I spoke to his wife about CODE STATUS and whether or not she would like a palliative consult. I told her that the patient was in a frail state. She agreed to a palliative care consult and at this time patient continues to be full code.     Code status: full  DVT prophylaxis: lovenox    Care Plan discussed with: Patient/Family and Nurse  Anticipated Disposition: TBD  Anticipated Discharge: Greater than 48 hours Hospital Problems  Never Reviewed          Codes Class Noted POA    Acute respiratory failure with hypoxia and hypercarbia (HCC) ICD-10-CM: J96.01, J96.02  ICD-9-CM: 518.81  5/18/2022 Unknown        SOB (shortness of breath) ICD-10-CM: R06.02  ICD-9-CM: 786.05  5/17/2022 Unknown                Review of Systems:   A comprehensive review of systems was negative except for that written in the HPI. Vital Signs:    Last 24hrs VS reviewed since prior progress note. Most recent are:  Visit Vitals  /74   Pulse 76   Temp 98.7 °F (37.1 °C)   Resp 18   Ht 5' 2\" (1.575 m)   Wt 70.3 kg (155 lb)   SpO2 98%   BMI 28.35 kg/m²       No intake or output data in the 24 hours ending 05/18/22 1540     Physical Examination:     I had a face to face encounter with this patient and independently examined them on 5/18/2022 as outlined below:          Constitutional:  No acute distress, somnolent but improved. Cannot fully answer questions due to dementia plus minus confusion. ENT:  Oral mucosa moist, oropharynx benign. Resp:  No wheezing/rhonchi. Bilateral crackles diffusely. No accessory muscle use   CV:  Regular rhythm, normal rate, no murmurs, gallops, rubs    GI:  Soft, non distended, non tender. normoactive bowel sounds, no hepatosplenomegaly     Musculoskeletal:  No edema, warm, 2+ pulses throughout    Neurologic:  Moves all extremities.   Disoriented and difficult to understand            Data Review:    Review and/or order of clinical lab test  Review and/or order of tests in the radiology section of CPT  Review and/or order of tests in the medicine section of CPT      Labs:     Recent Labs     05/18/22  0618 05/17/22  1405   WBC 4.5 6.3   HGB 11.7* 11.2*   HCT 40.2 38.4    227     Recent Labs     05/18/22  0618 05/17/22  1405    142   K 4.4 3.9    107   CO2 31 29   BUN 37* 34*   CREA 1.61* 1.70*   * 167*   CA 9.5 9.3     Recent Labs     05/17/22  1405   ALT 36   AP 76   TBILI 0.4 TP 7.7   ALB 3.2*   GLOB 4.5*     No results for input(s): INR, PTP, APTT, INREXT in the last 72 hours. No results for input(s): FE, TIBC, PSAT, FERR in the last 72 hours. No results found for: FOL, RBCF   No results for input(s): PH, PCO2, PO2 in the last 72 hours. No results for input(s): CPK, CKNDX, TROIQ in the last 72 hours. No lab exists for component: CPKMB  Lab Results   Component Value Date/Time    Cholesterol, total 129 01/05/2022 04:48 AM    HDL Cholesterol 35 01/05/2022 04:48 AM    LDL, calculated 67.8 01/05/2022 04:48 AM    Triglyceride 131 01/05/2022 04:48 AM    CHOL/HDL Ratio 3.7 01/05/2022 04:48 AM     Lab Results   Component Value Date/Time    Glucose (POC) 94 01/06/2022 05:45 AM    Glucose (POC) 84 01/06/2022 01:31 AM    Glucose (POC) 111 01/05/2022 04:39 PM    Glucose (POC) 128 (H) 01/05/2022 11:47 AM    Glucose (POC) 90 01/05/2022 07:24 AM     No results found for: COLOR, APPRN, SPGRU, REFSG, JOSE G, PROTU, GLUCU, KETU, BILU, UROU, STEPHANIE, LEUKU, GLUKE, EPSU, BACTU, WBCU, RBCU, CASTS, UCRY      Medications Reviewed:     Current Facility-Administered Medications   Medication Dose Route Frequency    sodium chloride (NS) flush 5-40 mL  5-40 mL IntraVENous Q8H    sodium chloride (NS) flush 5-40 mL  5-40 mL IntraVENous PRN    acetaminophen (TYLENOL) tablet 650 mg  650 mg Oral Q6H PRN    Or    acetaminophen (TYLENOL) suppository 650 mg  650 mg Rectal Q6H PRN    ondansetron (ZOFRAN ODT) tablet 4 mg  4 mg Oral Q8H PRN    Or    ondansetron (ZOFRAN) injection 4 mg  4 mg IntraVENous Q6H PRN    enoxaparin (LOVENOX) injection 30 mg  30 mg SubCUTAneous DAILY    bumetanide (BUMEX) injection 1 mg  1 mg IntraVENous Q12H    dilTIAZem (CARDIZEM) 125 mg in dextrose 5% 125 mL infusion  0-15 mg/hr IntraVENous TITRATE     Current Outpatient Medications   Medication Sig    furosemide (LASIX) 40 mg tablet Take 40 mg by mouth daily.     dulaglutide (Trulicity) 3 GO/8.3 mL pnij 0.5 mL by SubCUTAneous route every Monday.  albuterol (Ventolin HFA) 90 mcg/actuation inhaler Take 2 Puffs by inhalation every four (4) hours as needed for Wheezing.  acetaminophen (TYLENOL) 325 mg tablet Take 650 mg by mouth three (3) times daily. For pain    isosorbide mononitrate ER (IMDUR) 30 mg tablet Take 30 mg by mouth every morning.  acetaminophen (TYLENOL) 325 mg tablet Take 650 mg by mouth every four (4) hours as needed for Pain or Fever.  polyvinyl alcohol (LIQUIFILM TEARS) 1.4 % ophthalmic solution Administer 1 Drop to both eyes as needed.  aspirin 81 mg chewable tablet Take 81 mg by mouth daily.  calcium carbonate (OS-LUL) 500 mg calcium (1,250 mg) tablet Take  by mouth daily.  cholecalciferol (VITAMIN D3) (1000 Units /25 mcg) tablet Take 4,000 Units by mouth daily.  ezetimibe (ZETIA) 10 mg tablet Take 0.5 mg by mouth daily.  ferrous sulfate 325 mg (65 mg iron) tablet Take  by mouth two (2) times a day.  fluticasone propionate (FLONASE) 50 mcg/actuation nasal spray 2 Sprays by Both Nostrils route daily.  loratadine 10 mg cap Take 10 mg by mouth daily.  mirtazapine (REMERON) 7.5 mg tablet Take 7.5 mg by mouth nightly.  omeprazole (PRILOSEC) 20 mg capsule Take 20 mg by mouth daily.      ______________________________________________________________________  EXPECTED LENGTH OF STAY: - - -  ACTUAL LENGTH OF STAY:          [address] Sun MD

## 2022-05-18 NOTE — ED NOTES
Pt unable to tolorate Bipap. MD notified, pt placed on NC at 4L. Sat 92%. Pt also placed on external catheter after two unsuccessful attempts to get dwyer placed.

## 2022-05-18 NOTE — CONSULTS
Pulmonary    Reason: Acute on chronic respiratory failure  Requesting: Dr. Fabrizio Matos reviewed / images viewed / labs and studies reviewed    Hx:  49-year-old male with a history of dementia type 2 diabetes and hypertension who presented with shortness of breath had previously been hospitalized in March of this year for acute kidney injury with hyperkalemia. He was noted to be tachycardic and had to be placed on BiPAP for respiratory distress. Patient's arterial blood gas showed hypoxemia with acute on chronic hypercarbia chest x-ray was suggestive of pulmonary edema and proBNP was markedly elevated patient received IV diuretics was initially placed on noninvasive positive pressure ventilation but was then weaned to nasal oxygen. Hospital physician concerned about increasing PCO2 with relatively normal pH patient has been transferred to the ICU for possible requirements of BiPAP. Objective:  Patient Vitals for the past 4 hrs:   BP Temp Pulse Resp SpO2   22 1619     92 %   22 1544 110/66 98.2 °F (36.8 °C) 72 20 97 %   22 1358   76 18 98 %   Temp (24hrs), Av.5 °F (36.9 °C), Min:98.2 °F (36.8 °C), Max:98.7 °F (37.1 °C)  No intake or output data in the 24 hours ending 22 1739    Chest x-ray May 17, 2022: Cardiomegaly with pulmonary edema and bilateral pleural effusions  Laboratory data: White blood count 4.5, hemoglobin 11.7, platelet count 727, serum bicarbonate 31, creatinine 1.61, lactic acid 1.8, procalcitonin 0.05, high-sensitivity troponin 141, proBNP greater than 35,000  Arterial blood gas: On nasal oxygen at 3 L/minpH 7.32, PCO2 59.1, PO2 60  Rapid COVID test negative  Respiratory viral panel: Negative    Pulmonary Impression / Recommendations:  1. Acute on chronic respiratory failure with hypoxemia and hypercarbia secondary to decompensated congestive heart failure/volume overload  2. Atrial fibrillationrate controlled on Cardizem  3. Chronic kidney disease  4. Dementia    Supplemental oxygen to maintain saturations of 90 to 92% BiPAP may be helpful if work of breathing is elevated or hypercapnia worsens with falling pH.   diuresis  Cardizem drip for rate control of atrial fibrillation  cardiology following  palliative care to see    Che Jules MD

## 2022-05-18 NOTE — PROGRESS NOTES
Called the patient's son and spoke to his wife who gave me the son's cell number. I called the cell and it went straight to voicemail so I left him a message.

## 2022-05-18 NOTE — ED NOTES
Pt found in room trying to leave hospital bed. Pt had removed all monitoring devices and oxygen. Pt was found with legs hanging over side of bed. When asked where he was going he responded \"I need to go to work\". Pt redirected and placed on monitor.

## 2022-05-18 NOTE — PROGRESS NOTES
The patient's ABG is showing worsening hypercapnia on nasal cannula. It is trending up, though pH is slightly better. The patient needs BiPAP. He is more awake than before and has agreed to use the BiPAP. BiPAP ordered and he will have to be moved to the ICU to receive it. Repeat ABG ordered for 18:30.

## 2022-05-18 NOTE — PROGRESS NOTES
TRANSFER - OUT REPORT:    Verbal report given to Elizabeth Degroot RN(name) on [facility]  being transferred to CCU(unit) for change in patient condition(In need of BIPAP)       Report consisted of patients Situation, Background, Assessment and   Recommendations(SBAR). Information from the following report(s) SBAR, Kardex, Intake/Output, MAR, Accordion and Cardiac Rhythm NSR was reviewed with the receiving nurse. Lines:   Peripheral IV 66/05/32 Left Basilic (Active)   Site Assessment Clean, dry, & intact 05/17/22 2259   Phlebitis Assessment 0 05/17/22 2259   Infiltration Assessment 0 05/17/22 2259   Dressing Status Clean, dry, & intact 05/17/22 2259   Dressing Type Transparent 05/17/22 2259   Hub Color/Line Status Pink;Flushed;Patent 05/17/22 2259       Peripheral IV 05/18/22 Right;Mid Forearm (Active)        Opportunity for questions and clarification was provided.       Patient transported with:   O2 @ 5 liters

## 2022-05-18 NOTE — ED NOTES
Need for secondary line. Pt current IV not able to be used for blood draws. Pt has had mx sticks with no success at blood draw.

## 2022-05-18 NOTE — PROGRESS NOTES
I spoke to the patient's wife. I explained that he is in a very frail and tenuous state and has a very poor prognosis. We discussed code status and she is not sure of what she wants to do. She agreed to a Palliative Care consult and for now we will continue to treat as we are and keep him Full Code.

## 2022-05-18 NOTE — PROGRESS NOTES
Admission Medication Reconciliation:      Documents from the [address] [place] [address] were used to update PTA med list- these were located in the media section of the eMR. Attempting to contact  Dr. Elkin Yusuf via [address] [place] [address] Avenue text to update regarding completion of/changes to PTA med list.    Medication changes (since last review): Added:  Furosemide  Trulicity  Ventolin inhaler  Isosorbide mononitrate  APAP-scheduled for pain    Removed:  Ascorbic acid  Ondansetron    Thank you for allowing me to participate in the care of your patient. Lucien Alvarez PharmD, RN # [phone]       [facility] pharmacy benefit data reflects medications filled and processed through the patient's insurance, however   this data does NOT capture whether the medication was picked up or is currently being taken by the patient. Allergies:  Patient has no known allergies. Significant PMH/Disease States:   Past Medical History:   Diagnosis Date    Dementia [facility])      Chief Complaint for this Admission:  No chief complaint on file. Prior to Admission Medications:   Prior to Admission Medications   Prescriptions Last Dose Informant Taking?   acetaminophen (TYLENOL) 325 mg tablet   Yes   Sig: Take 650 mg by mouth three (3) times daily. For pain   acetaminophen (TYLENOL) 325 mg tablet   Yes   Sig: Take 650 mg by mouth every four (4) hours as needed for Pain or Fever. albuterol (Ventolin HFA) 90 mcg/actuation inhaler   Yes   Sig: Take 2 Puffs by inhalation every four (4) hours as needed for Wheezing. aspirin 81 mg chewable tablet   Yes   Sig: Take 81 mg by mouth daily. calcium carbonate (OS-LUL) 500 mg calcium (1,250 mg) tablet   Yes   Sig: Take  by mouth daily. cholecalciferol (VITAMIN D3) (1000 Units /25 mcg) tablet   Yes   Sig: Take 4,000 Units by mouth daily.    dulaglutide (Trulicity) 3 EY/5.6 mL pnij   Yes   Si.5 mL by SubCUTAneous route every Monday.   ezetimibe (ZETIA) 10 mg tablet   Yes   Sig: Take 0.5 mg by mouth daily. ferrous sulfate 325 mg (65 mg iron) tablet   Yes   Sig: Take  by mouth two (2) times a day. fluticasone propionate (FLONASE) 50 mcg/actuation nasal spray   Yes   Si Sprays by Both Nostrils route daily. furosemide (LASIX) 40 mg tablet   Yes   Sig: Take 40 mg by mouth daily. isosorbide mononitrate ER (IMDUR) 30 mg tablet   Yes   Sig: Take 30 mg by mouth every morning. loratadine 10 mg cap   Yes   Sig: Take 10 mg by mouth daily. mirtazapine (REMERON) 7.5 mg tablet   Yes   Sig: Take 7.5 mg by mouth nightly. omeprazole (PRILOSEC) 20 mg capsule   Yes   Sig: Take 20 mg by mouth daily. polyvinyl alcohol (LIQUIFILM TEARS) 1.4 % ophthalmic solution   Yes   Sig: Administer 1 Drop to both eyes as needed. Facility-Administered Medications: None     Please contact the main inpatient pharmacy with any questions or concerns at [phone] and we will direct you to the clinical pharmacist covering this patient's care while in-house.    Venessa Nix [place]

## 2022-05-18 NOTE — ED NOTES
Discussed with DINORAH Payne order and pt now on NC. Pt does still get short winded when speaking. Pt able to communicate and is appropriate to situation. Advised MD that he has not had dwyer placed. Per dayshift RN, she was afraid he would pull it out and gave pt urinal. Pt has yet to use urinal./ will discuss with pt Dwyer placement.

## 2022-05-18 NOTE — CONSULTS
[facility]  Cardiology Care Note                                 Initial visit    Patient Name: Marie Claros - UKZ:[birth year] - JTB:[id number]  Consulting Cardiologist: David De Leon MD  Reason for Consult: new onset afib     HPI: Patient is a 80year old male with a hx of HTN, CKD, DM, dementia, and dysphagia. He presented to hospital with hypoxia and tachycardia. O2 sats 83%, HR in 150s per EMS. In ED EKG reviewed and was AFlutter with HR 140s. ProBNP 86204, today at <35,000. CXR showed mod pulm edema and bilateral effusions. He was given IV Bumex 2mg and Solumedrol. Initially on BiPAP. Placed on Diltiazem gtt for afib/afl. He has converted to SR overnight. He is unable to provide hx, nor could he report symptoms today. Assessment and Plan     1. Atrial flutter with RVR: initially with -150s. On Diltiazem gtt has converted to SR, HR in  range. Continue on Diltiazem gtt for now. 2. Hypercapnic respiratory failure/elevated BNP: cont on Bumex 1mg IV q12hrs. Echo is pending. Strict I/O, daily weights. 3. HTN: stable  Patient seen on the day of progress note and examined  and agree with Advance Practice Provider (KONSTANTIN, NP,PA)  assessment and plans. Currently back in normal sinus rhythm. Continue IV Cardizem until echocardiogram results is available. proBNP significant elevated and chest x-ray with pulmonary edema he likely has some degree of cardiomyopathy. Strict I's and O's. Follow creatinine. Concerning anticoagulation I suspect the patient not a good candidate long-term given underlying significant dementia and comorbid conditions but final decision will be made after further testing evaluation. Minimal elevation in troponin also noted. This is likely demand ischemia and not a true NSTEMI.     Aneurysmal dilatation of the abdominal aorta was noted at 5.6 cm on ultrasound of March 2022. Possible aortic dissection was suggested at that time. CTA not done at that point. Ultimately also has a history of abdominal aortic aneurysm follow at the [facility] atherosclerotic coronary disease previous history of tobacco abuse GI bleed with heme positive stools hyperlipidemia and previous falls. ____________________________________________________________      Patient Active Problem List   Diagnosis Code    Blurred vision H53.8    Confusion R41.0    Hyperkalemia E87.5    SOB (shortness of breath) R06.02    Acute respiratory failure with hypoxia and hypercarbia (HCC) J96.01, J96.02     No specialty comments available. [x] Patient unable to provide secondary to condition        Past Medical History:   Diagnosis Date    Dementia ([facility] [address]      No past surgical history on file. Current Facility-Administered Medications   Medication Dose Route Frequency    sodium chloride (NS) flush 5-40 mL  5-40 mL IntraVENous Q8H    sodium chloride (NS) flush 5-40 mL  5-40 mL IntraVENous PRN    acetaminophen (TYLENOL) tablet 650 mg  650 mg Oral Q6H PRN    Or    acetaminophen (TYLENOL) suppository 650 mg  650 mg Rectal Q6H PRN    ondansetron (ZOFRAN ODT) tablet 4 mg  4 mg Oral Q8H PRN    Or    ondansetron (ZOFRAN) injection 4 mg  4 mg IntraVENous Q6H PRN    enoxaparin (LOVENOX) injection 30 mg  30 mg SubCUTAneous DAILY    bumetanide (BUMEX) injection 1 mg  1 mg IntraVENous Q12H    dilTIAZem (CARDIZEM) 125 mg in dextrose 5% 125 mL infusion  0-15 mg/hr IntraVENous TITRATE     Current Outpatient Medications   Medication Sig    polyvinyl alcohol (LIQUIFILM TEARS) 1.4 % ophthalmic solution Administer 1 Drop to both eyes as needed.  ascorbic acid, vitamin C, (VITAMIN C) 500 mg tablet Take 500 mg by mouth daily.  aspirin 81 mg chewable tablet Take 81 mg by mouth daily.  calcium carbonate (OS-LUL) 500 mg calcium (1,250 mg) tablet Take  by mouth daily.     cholecalciferol (VITAMIN D3) (1000 Units /25 mcg) tablet Take 4,000 Units by mouth daily.  ezetimibe (ZETIA) 10 mg tablet Take 0.5 mg by mouth.  ferrous sulfate 325 mg (65 mg iron) tablet Take  by mouth two (2) times a day.  fluticasone propionate (FLONASE) 50 mcg/actuation nasal spray 2 Sprays by Both Nostrils route daily.  loratadine 10 mg cap Take 10 mg by mouth daily.  mirtazapine (REMERON) 7.5 mg tablet Take 7.5 mg by mouth nightly.  omeprazole (PRILOSEC) 20 mg capsule Take 20 mg by mouth daily.  ondansetron (ZOFRAN ODT) 4 mg disintegrating tablet Take 4 mg by mouth every six (6) hours as needed for Nausea or Vomiting. No Known Allergies     FmHx: family history is not on file. Social Hx :         Objective:    Physical Exam    Vitals:   Vitals:    05/18/22 0820 05/18/22 0827 05/18/22 0920 05/18/22 0947   BP:    116/72   Pulse: 72 70  70   Resp: 19 19  24   Temp:       SpO2:   99%    Weight:       Height:           General:    Alert, cooperative, no distress, appears stated age. Neck:   Supple, no carotid bruit and no JVD. Back:     Symmetric, normal curvature. Lungs:     Crackles chalo posterior lung fields. Heart[de-identified]    Regular rate and rhythm, S1, S2 normal, no murmur, click, rub or gallop. Abdomen:     Soft, non-tender. Bowel sounds normal.    Extremities:   Extremities normal, atraumatic, no cyanosis, 2-3+chalo LE edema. Vascular:   Pulses - chalo UE/LE equal   Skin:   Skin color normal. No rashes or lesions on visible areas   Neurologic:   CN II-XII grossly intact.               EKG Results     Procedure 720 Value Units Date/Time    EKG, 12 LEAD, INITIAL [[id number]] Collected: 05/17/22 1334    Order Status: Completed Updated: 05/17/22 1536     Ventricular Rate 145 BPM      QRS Duration 116 ms      Q-T Interval 320 ms      QTC Calculation (Bezet) 497 ms      Calculated R Axis -21 degrees      Calculated T Axis 169 degrees      Diagnosis --      Critical Test Result: High HR  Atrial fibrillation with rapid ventricular response  Minimal voltage criteria for LVH, may be normal variant ( [place] product )  Anterior infarct (cited on or before 14-MAR-2022)  ST & T wave abnormality, consider lateral ischemia  Abnormal ECG  When compared with ECG of 14-MAR-2022 18:26,  Significant changes have occurred  Confirmed by Sena Bhatt MD ([id number]) on 5/17/2022 3:35:42 PM            Data Review:     Radiology:   XR Results (most recent):  Results from Hospital Encounter encounter on 05/17/22    XR CHEST PORT    Narrative  EXAM: XR CHEST PORT    INDICATION: Shortness of breath    COMPARISON: 3/14/2022    FINDINGS: A portable AP radiograph of the chest was obtained at 1410 hours. The  patient is on a cardiac monitor. There is moderate pulmonary edema with  bilateral pleural effusions, new as compared to the prior study. . The cardiac  and mediastinal contours are stable. Impression  Moderate pulmonary edema with bilateral pleural effusions        No results for input(s): CPK, TROIQ in the last 72 hours. No lab exists for component: CKQMB, CPKMB, BMPP  Recent Labs     05/18/22  0618 05/17/22  1405    142   K 4.4 3.9    107   CO2 31 29   BUN 37* 34*   CREA 1.61* 1.70*   * 167*   CA 9.5 9.3     Recent Labs     05/18/22  0618 05/17/22  1405   WBC 4.5 6.3   HGB 11.7* 11.2*   HCT 40.2 38.4    227     Recent Labs     05/17/22  1405   AP 76     No results for input(s): CHOL, LDLC in the last 72 hours.     No lab exists for component: TGL, HDLC,  HBA1C  Recent Labs     05/17/22  1939   TSH 0.80       Christophe Obando NP    [facility] [address] [place] [address] 13, [address] [facility] [address] [place] [address] [facility]  [phone]      Erica Wayne MD

## 2022-05-19 ENCOUNTER — APPOINTMENT (OUTPATIENT)
Dept: NON INVASIVE DIAGNOSTICS | Age: 87
DRG: 291 | End: 2022-05-19
Attending: ANESTHESIOLOGY
Payer: MEDICARE

## 2022-05-19 LAB
ANION GAP SERPL CALC-SCNC: 6 MMOL/L (ref 5–15)
ARTERIAL PATENCY WRIST A: POSITIVE
BASE EXCESS BLD CALC-SCNC: 2.5 MMOL/L
BASOPHILS # BLD: 0.1 K/UL (ref 0–0.1)
BASOPHILS NFR BLD: 1 % (ref 0–1)
BDY SITE: ABNORMAL
BNP SERPL-MCNC: ABNORMAL PG/ML
BUN SERPL-MCNC: 41 MG/DL (ref 6–20)
BUN/CREAT SERPL: 27 (ref 12–20)
CALCIUM SERPL-MCNC: 9.6 MG/DL (ref 8.5–10.1)
CHLORIDE SERPL-SCNC: 109 MMOL/L (ref 97–108)
CO2 SERPL-SCNC: 29 MMOL/L (ref 21–32)
CREAT SERPL-MCNC: 1.51 MG/DL (ref 0.7–1.3)
DIFFERENTIAL METHOD BLD: ABNORMAL
EOSINOPHIL # BLD: 0.1 K/UL (ref 0–0.4)
EOSINOPHIL NFR BLD: 1 % (ref 0–7)
ERYTHROCYTE [DISTWIDTH] IN BLOOD BY AUTOMATED COUNT: 16 % (ref 11.5–14.5)
GAS FLOW.O2 O2 DELIVERY SYS: ABNORMAL L/MIN
GLUCOSE SERPL-MCNC: 136 MG/DL (ref 65–100)
HCO3 BLD-SCNC: 28.5 MMOL/L (ref 22–26)
HCT VFR BLD AUTO: 37 % (ref 36.6–50.3)
HGB BLD-MCNC: 10.9 G/DL (ref 12.1–17)
IMM GRANULOCYTES # BLD AUTO: 0 K/UL (ref 0–0.04)
IMM GRANULOCYTES NFR BLD AUTO: 0 % (ref 0–0.5)
LACTATE SERPL-SCNC: 0.9 MMOL/L (ref 0.4–2)
LYMPHOCYTES # BLD: 0.7 K/UL (ref 0.8–3.5)
LYMPHOCYTES NFR BLD: 14 % (ref 12–49)
MCH RBC QN AUTO: 30.9 PG (ref 26–34)
MCHC RBC AUTO-ENTMCNC: 29.5 G/DL (ref 30–36.5)
MCV RBC AUTO: 104.8 FL (ref 80–99)
MONOCYTES # BLD: 0.6 K/UL (ref 0–1)
MONOCYTES NFR BLD: 12 % (ref 5–13)
NEUTS SEG # BLD: 3.7 K/UL (ref 1.8–8)
NEUTS SEG NFR BLD: 72 % (ref 32–75)
NRBC # BLD: 0 K/UL (ref 0–0.01)
NRBC BLD-RTO: 0 PER 100 WBC
PCO2 BLD: 48.9 MMHG (ref 35–45)
PH BLD: 7.37 [PH] (ref 7.35–7.45)
PLATELET # BLD AUTO: 207 K/UL (ref 150–400)
PMV BLD AUTO: 10.2 FL (ref 8.9–12.9)
PO2 BLD: 156 MMHG (ref 80–100)
POTASSIUM SERPL-SCNC: 4 MMOL/L (ref 3.5–5.1)
PROCALCITONIN SERPL-MCNC: <0.05 NG/ML
RBC # BLD AUTO: 3.53 M/UL (ref 4.1–5.7)
RBC MORPH BLD: ABNORMAL
RBC MORPH BLD: ABNORMAL
SAO2 % BLD: 99.3 % (ref 92–97)
SODIUM SERPL-SCNC: 144 MMOL/L (ref 136–145)
SPECIMEN TYPE: ABNORMAL
WBC # BLD AUTO: 5.2 K/UL (ref 4.1–11.1)

## 2022-05-19 PROCEDURE — 84145 PROCALCITONIN (PCT): CPT

## 2022-05-19 PROCEDURE — 65660000001 HC RM ICU INTERMED STEPDOWN

## 2022-05-19 PROCEDURE — 82803 BLOOD GASES ANY COMBINATION: CPT

## 2022-05-19 PROCEDURE — 36415 COLL VENOUS BLD VENIPUNCTURE: CPT

## 2022-05-19 PROCEDURE — 83605 ASSAY OF LACTIC ACID: CPT

## 2022-05-19 PROCEDURE — 80048 BASIC METABOLIC PNL TOTAL CA: CPT

## 2022-05-19 PROCEDURE — 85025 COMPLETE CBC W/AUTO DIFF WBC: CPT

## 2022-05-19 PROCEDURE — 74011250637 HC RX REV CODE- 250/637: Performed by: FAMILY MEDICINE

## 2022-05-19 PROCEDURE — 97530 THERAPEUTIC ACTIVITIES: CPT

## 2022-05-19 PROCEDURE — 99223 1ST HOSP IP/OBS HIGH 75: CPT | Performed by: NURSE PRACTITIONER

## 2022-05-19 PROCEDURE — 74011250636 HC RX REV CODE- 250/636: Performed by: FAMILY MEDICINE

## 2022-05-19 PROCEDURE — 83880 ASSAY OF NATRIURETIC PEPTIDE: CPT

## 2022-05-19 PROCEDURE — 74011000250 HC RX REV CODE- 250: Performed by: NURSE PRACTITIONER

## 2022-05-19 PROCEDURE — 92526 ORAL FUNCTION THERAPY: CPT

## 2022-05-19 PROCEDURE — 74011000250 HC RX REV CODE- 250: Performed by: FAMILY MEDICINE

## 2022-05-19 PROCEDURE — 36600 WITHDRAWAL OF ARTERIAL BLOOD: CPT

## 2022-05-19 PROCEDURE — 74011250637 HC RX REV CODE- 250/637: Performed by: STUDENT IN AN ORGANIZED HEALTH CARE EDUCATION/TRAINING PROGRAM

## 2022-05-19 PROCEDURE — 99233 SBSQ HOSP IP/OBS HIGH 50: CPT | Performed by: SPECIALIST

## 2022-05-19 PROCEDURE — 77010033678 HC OXYGEN DAILY

## 2022-05-19 PROCEDURE — 74011250636 HC RX REV CODE- 250/636: Performed by: NURSE PRACTITIONER

## 2022-05-19 RX ADMIN — BUMETANIDE 1 MG: 0.25 INJECTION INTRAMUSCULAR; INTRAVENOUS at 09:01

## 2022-05-19 RX ADMIN — SODIUM CHLORIDE, PRESERVATIVE FREE 10 ML: 5 INJECTION INTRAVENOUS at 23:38

## 2022-05-19 RX ADMIN — ISOSORBIDE MONONITRATE 30 MG: 30 TABLET, EXTENDED RELEASE ORAL at 06:28

## 2022-05-19 RX ADMIN — WATER 5 MG: 1 INJECTION INTRAMUSCULAR; INTRAVENOUS; SUBCUTANEOUS at 03:35

## 2022-05-19 RX ADMIN — ASPIRIN 81 MG CHEWABLE TABLET 81 MG: 81 TABLET CHEWABLE at 09:11

## 2022-05-19 RX ADMIN — ACETAMINOPHEN 650 MG: 325 TABLET ORAL at 22:39

## 2022-05-19 RX ADMIN — MIRTAZAPINE 7.5 MG: 15 TABLET, FILM COATED ORAL at 22:32

## 2022-05-19 RX ADMIN — BUMETANIDE 1 MG: 0.25 INJECTION INTRAMUSCULAR; INTRAVENOUS at 22:32

## 2022-05-19 RX ADMIN — ENOXAPARIN SODIUM 30 MG: 100 INJECTION SUBCUTANEOUS at 09:05

## 2022-05-19 RX ADMIN — SODIUM CHLORIDE, PRESERVATIVE FREE 10 ML: 5 INJECTION INTRAVENOUS at 06:28

## 2022-05-19 NOTE — CONSULTS
Palliative Medicine Consult  [place]: [phone] ([phone])    Patient Name: Chris Pedraza  YOB: 1931    Date of Initial Consult: May 19, 2022  Reason for Consult: Care Decisions  Requesting Provider: Dr. Anastacio Yañez  Primary Care Physician: German Malloy MD     SUMMARY:   Chris Pedraza is a 80 y.o. with a past history of dementia, ckd, dm2, HTN, DLD, frequent falls, FTT, who was admitted on 5/17/2022 from [facility] due to shortness of breath with a diagnosis of acute on chronic respiratory failure with hypoxemia and hypercarbia, chf exacerbation, fvo, a fib. Current medical issues leading to Palliative Medicine involvement include: pt acutely ill with high risk of decline in full code status. We have been asked to support with care goals. Social: pt is  and has 1 step son and 5 biological sons. Moved to the [facility] about a year ago due to increasing care needs. He is a vet with full benefits. Usually cared for at the [place] per wife. PALLIATIVE DIAGNOSES:   1. Palliative Medicine encounter  2. Shortness of breath  3. Hypoxia  4. DNR discussion  5. Physical debility      PLAN:   1. Pt seen with wife (via phone) along with Reyna Valles LCSW. Services introduced  2. Discussed patient's condition and reason for admission. Questions answered to the best of my ability  3. Pt has rallied since initial admission. Dyspnea improved. He removed bipap mask and has tolerated RA oxygen  4. Pt demonstrates capacity for decisions. He has been living at the [facility] for about 1 year. He feels well cared for there. 5. We discussed his code status and he is electing DNR  6. We also discussed that there may be a time that he may require temporary mechanical ventilation for respiratory distress. Pt agrees to bipap but no intubation. He agrees to DNI  7. He expressed an interest to continue working with physical therapy. 8. Plans to transfer out of CCU  9. Wife has our contact information. 10. Will follow peripherally. Please call for urgent needs. 11. Initial consult note routed to primary continuity provider and/or primary health care team members  12. Communicated plan of care with: Palliative IDTMaycol Team     GOALS OF CARE / TREATMENT PREFERENCES:     GOALS OF CARE:  Patient/Health Care Proxy Stated Goals: Rehabilitation    TREATMENT PREFERENCES:   Code Status: DNR    Patient and family's personal goals include: rehab    Advance Care Planning:  [] [facility] Interdisciplinary Team has updated the ACP Navigator with Jack[address] and Patient Capacity      Primary Decision Maker (Active): Reji Chas - Spouse - [phone]  Advance Care Planning 3/14/2022   Confirm Advance Directive Yes, not on file       Medical Interventions: Limited additional interventions       Other:    As far as possible, the palliative care team has discussed with patient / health care proxy about goals of care / treatment preferences for patient. HISTORY:     History obtained from: Chart, team    CHIEF COMPLAINT: Patient admitted with acute respiratory failure    HPI/SUBJECTIVE:    The patient is:   [x] Verbal and participatory  [] Non-participatory due to:   Feeling better.   Eating lunch    Clinical Pain Assessment (nonverbal scale for severity on nonverbal patients):   Clinical Pain Assessment  Severity: 0          Duration: for how long has pt been experiencing pain (e.g., 2 days, 1 month, years)  Frequency: how often pain is an issue (e.g., several times per day, once every few days, constant)     FUNCTIONAL ASSESSMENT:     Palliative Performance Scale (PPS):  PPS: 50       PSYCHOSOCIAL/SPIRITUAL SCREENING:     Palliative IDT has assessed this patient for cultural preferences / practices and a referral made as appropriate to needs (Cultural Services, Patient Advocacy, Ethics, etc.)    Any spiritual / Restorationism concerns:  [] Yes /  [x] No   If \"Yes\" to discuss with pastoral care during IDT     Does caregiver feel burdened by caring for their loved one:   [] Yes /  [x] No /  [] No Caregiver Present    If \"Yes\" to discuss with social work during IDT    Anticipatory grief assessment:   [x] Normal  / [] Maladaptive     If \"Maladaptive\" to discuss with social work during IDT    ESAS Anxiety: Anxiety: 0    ESAS Depression: Depression: 0        REVIEW OF SYSTEMS:     Positive and pertinent negative findings in ROS are noted above in HPI. The following systems were [x] reviewed / [] unable to be reviewed as noted in HPI  Other findings are noted below. Systems: constitutional, ears/nose/mouth/throat, respiratory, gastrointestinal, genitourinary, musculoskeletal, integumentary, neurologic, psychiatric, endocrine. Positive findings noted below. Modified ESAS Completed by: provider   Fatigue: 0 Drowsiness: 0   Depression: 0 Pain: 0   Anxiety: 0 Nausea: 0   Anorexia: 0 Dyspnea: 1                    PHYSICAL EXAM:     From RN flowsheet:  Wt Readings from Last 3 Encounters:   05/19/22 193 lb 12.6 oz (87.9 kg)   03/18/22 181 lb 7 oz (82.3 kg)   01/05/22 202 lb 13.2 oz (92 kg)     Blood pressure 127/81, pulse 90, temperature 98 °F (36.7 °C), resp. rate 18, height 5' 2\" (1.575 m), weight 193 lb 12.6 oz (87.9 kg), SpO2 93 %.     Pain Scale 1: Numeric (0 - 10)  Pain Intensity 1: 0                 Last bowel movement, if known:     Constitutional: Alert, NAD, frail  Eyes: pupils equal, anicteric  ENMT: no nasal discharge, moist mucous membranes, tongue swollen  Cardiovascular:   Respiratory: breathing not labored on nasal cannula oxygen, symmetric  Gastrointestinal: soft non-tender, +bowel sounds  Musculoskeletal: no deformity, no tenderness to palpation  Skin: warm, dry  Neurologic: following commands, moving all extremities  Psychiatric: full affect, no hallucinations  Other:       HISTORY:     Active Problems:    SOB (shortness of breath) (5/17/2022)      Acute respiratory failure with hypoxia and hypercarbia ([facility] [address] (5/18/2022)      Past Medical History:   Diagnosis Date    Dementia ([facility] [address]       No past surgical history on file. No family history on file. History reviewed, no pertinent family history.   Social History     Tobacco Use    Smoking status: Not on file    Smokeless tobacco: Not on file   Substance Use Topics    Alcohol use: Not on file     No Known Allergies   Current Facility-Administered Medications   Medication Dose Route Frequency    artificial saliva (MOUTH KOTE) 1 Spray  1 Spray Oral PRN    sodium chloride (NS) flush 5-40 mL  5-40 mL IntraVENous Q8H    sodium chloride (NS) flush 5-40 mL  5-40 mL IntraVENous PRN    acetaminophen (TYLENOL) tablet 650 mg  650 mg Oral Q6H PRN    Or    acetaminophen (TYLENOL) suppository 650 mg  650 mg Rectal Q6H PRN    ondansetron (ZOFRAN ODT) tablet 4 mg  4 mg Oral Q8H PRN    Or    ondansetron (ZOFRAN) injection 4 mg  4 mg IntraVENous Q6H PRN    enoxaparin (LOVENOX) injection 30 mg  30 mg SubCUTAneous DAILY    bumetanide (BUMEX) injection 1 mg  1 mg IntraVENous Q12H    albuterol-ipratropium (DUO-NEB) 2.5 MG-0.5 MG/3 ML  3 mL Nebulization Q4H PRN    aspirin chewable tablet 81 mg  81 mg Oral DAILY    isosorbide mononitrate ER (IMDUR) tablet 30 mg  30 mg Oral 7am    mirtazapine (REMERON) tablet 7.5 mg  7.5 mg Oral QHS    hydroxypropyl methylcellulose (ISOPTO TEARS) 0.5 % ophthalmic solution 1 Drop  1 Drop Both Eyes PRN    dilTIAZem (CARDIZEM) 125 mg in dextrose 5% 125 mL infusion  0-15 mg/hr IntraVENous TITRATE          LAB AND IMAGING FINDINGS:     Lab Results   Component Value Date/Time    WBC 5.2 05/19/2022 05:20 AM    HGB 10.9 (L) 05/19/2022 05:20 AM    PLATELET 223 43/93/9279 05:20 AM     Lab Results   Component Value Date/Time    Sodium 144 05/19/2022 05:20 AM    Potassium 4.0 05/19/2022 05:20 AM    Chloride 109 (H) 05/19/2022 05:20 AM    CO2 29 05/19/2022 05:20 AM    BUN 41 (H) 05/19/2022 05:20 AM    Creatinine 1.51 (H) 05/19/2022 05:20 AM    Calcium 9.6 05/19/2022 05:20 AM    Magnesium 2.0 03/17/2022 04:41 AM    Phosphorus 2.6 03/17/2022 05:13 AM      Lab Results   Component Value Date/Time    Alk. phosphatase 76 05/17/2022 02:05 PM    Protein, total 7.7 05/17/2022 02:05 PM    Albumin 3.2 (L) 05/17/2022 02:05 PM    Globulin 4.5 (H) 05/17/2022 02:05 PM     Lab Results   Component Value Date/Time    INR 1.0 01/04/2022 04:13 PM    Prothrombin time 10.5 01/04/2022 04:13 PM    aPTT 24.2 01/04/2022 04:13 PM      No results found for: IRON, FE, TIBC, IBCT, PSAT, FERR   Lab Results   Component Value Date/Time    pH 7.36 05/18/2022 08:13 PM    PCO2 50 (H) 05/18/2022 08:13 PM    PO2 86 05/18/2022 08:13 PM     No components found for: GLPOC   No results found for: CPK, CKMB             Total time: 70 min  Counseling / coordination time, spent as noted above: 60 min  > 50% counseling / coordination?: y    Prolonged service was provided for  []30 min   []75 min in face to face time in the presence of the patient, spent as noted above. Time Start:   Time End:   Note: this can only be billed with 38124 (initial) or 52733 (follow up). If multiple start / stop times, list each separately.

## 2022-05-19 NOTE — PROGRESS NOTES
Palliative Medicine  [facility]: [phone])  [facility]: [phone])      The Palliative Medicine SW met with the patient at bedside, he is awake and alert- he is able to recall that he is at [facility], when ask why he is here he is able to share \"I got sick,\" but cannot discuss details of his illness, however, he is much more engaged and alert during this visit. SW shares that we plan to call his wife, and he does recall, \"she'll be here at 12:00,\" (we have a 12:00 p.m. phone call established). He is able to provide more input today compared to yesterday and engages appropriately. Palliative Medicine NP Linda Mckeon and SW met with patient again at bedside- attempted to call spouse x2, unable to reach. We will make further attempt tomorrow to engage in goals of care conversation. Addendum 12:22 p.m.- Patient's spouse called unit- she shared that she wrote down the wrong time for our phone conference today- she is requesting that we call back at 2:00 p.m. so her son (patient's joshua Guillermo) can also participate.      Our team will come back at 2:00 p.m. to meet with the patient at bedside and have spouse via phone (Laurita Bui) and joshua Guillermo via phone per family request.     Thank you for including Palliative Medicine in the care of [address] [place] [address] A, [place]  [phone]

## 2022-05-19 NOTE — PROGRESS NOTES
Cardiology Progress Note            Admit Date: 5/17/2022  Admit Diagnosis: SOB (shortness of breath) [R06.02]  Acute respiratory failure with hypoxia and hypercarbia (HCC) [J96.01, J96.02]  Date: 5/19/2022     Time: 10:05 AM                  CARDIOLOGY ATTENDING ATTESTATION    CONSULT/PROGRESS NOTE      Patient seen on the day of progress note and examined  and agree with Advance Practice Provider (KONSTANTIN, NP,PA)  assessment and plans. Brief HPI:Patient is a 80year old male with a hx of HTN, CKD, DM, dementia, and dysphagia. He presented to hospital with hypoxia and tachycardia. O2 sats 83%, HR in 150s per EMS. In ED EKG reviewed and was AFlutter with HR 140s. ProBNP 03358, today at <35,000. CXR showed mod pulm edema and bilateral effusions. He was given IV Bumex 2mg and Solumedrol. Initially on BiPAP. Placed on Diltiazem gtt for afib/afl. He has converted to SR overnight. He is unable to provide hx, nor could he report symptoms today      A/P:More awake and interactive today  He remains for now in NSR  Continue iv cardizem  echo still pending, s/o cmp with poc echo  continue iv Bumex  Eventually gdmt to be implemented  AAA consider vascular evaluatio            BP Readings from Last 3 Encounters:   05/19/22 (!) 139/94   03/18/22 112/75   01/06/22 139/79       Pulse Readings from Last 3 Encounters:   05/19/22 85   03/18/22 87   01/06/22 75       Neck: no JVD  Heart: regular rate and rhythm  Lungs: diminished breath sounds R anterior, L anterior  Abdomen: soft, non-tender. Bowel sounds normal. No masses,  no organomegaly  Extremities: edema noted      01/04/22    ECHO ADULT COMPLETE 01/05/2022 1/5/2022    Interpretation Summary    Left Ventricle: Left ventricle size is normal. Normal wall thickness. Normal wall motion. Low normal left ventricular systolic function with a visually estimated EF of 50 - 55%. Normal diastolic function.    Aortic Valve: Mild sclerosis of the aortic valve cusps. Mild stenosis.   Mitral Valve: Mild transvalvular regurgitation.   Left Atrium: Left atrium is moderately dilated. Signed by: Olivia Moe MD on 1/5/2022  2:33 PM        No results found for: CPK, RCK1, RCK2, RCK3, RCK4, CKMB, CKNDX, CKND1, TROPT, TROIQ, BNPP, BNP    Lab Results   Component Value Date/Time    Creatinine 1.51 (H) 05/19/2022 05:20 AM       Lab Results   Component Value Date/Time    HGB 10.9 (L) 05/19/2022 05:20 AM                         Subjective:  Patient had episodes of agitation overnight, seems more appropriate this morning. Denies CP, or worsening SOB. Assessment and Plan     1. Atrial flutter with RVR: initial presentation with -150s. On Diltiazem gtt converted converted to SR. Episodes overnight of atrial flutter, SR with PACs, and AIVR. Continue on Diltiazem gtt for now. No OAC at this time.      2. Hypercapnic respiratory failure/elevated BNP: cont on Bumex 1mg IV q12hrs. Echo is ordered. Strict I/O, daily weights. BNP down today at 68[address] from yesterday. I/O +148 yesterday, net +298.     3. HTN: stable, 130s/80s overall    Patient seen on the day of progress note and examined  and agree with Advance Practice Provider (KONSTANTIN, NP,PA)  assessment and plans.   More awake and interactive today  He remains for now in NSR  Continue iv cardizem  echo still pending, s/o cmp with poc echo  continue iv Bumex  Eventually gdmt to be implemented  AAA consider vascular evaluation        No results found for: YANN   Past Medical History:   Diagnosis Date    Dementia ([facility] [address]       Social History     Tobacco Use    Smoking status: Not on file    Smokeless tobacco: Not on file   Substance Use Topics    Alcohol use: Not on file    Drug use: Not on file             Objective:     Physical Exam:                Visit Vitals  BP (!) 139/94 (BP 1 Location: Right arm, BP Patient Position: At rest;Sitting)   Pulse 85   Temp 97.8 °F (36.6 °C)   Resp 18   Ht 5' 2\" (1.575 m)   Wt 193 lb 12.6 oz (87.9 kg)   SpO2 100%   BMI 35.44 kg/m²        General Appearance:   Well developed, alert, and   individual in no acute distress. Ears/Nose/Mouth/Throat:    Hearing grossly normal.        Neck:  Supple. Chest:    Lungs decreased to auscultation bilaterally. Cardiovascular:   distant heart sounds, irregularly irregular rate and rhythm, S1, S2 normal, no murmur. Abdomen:    Soft, non-tender, bowel sounds are active. Extremities:  left leg with 1+ edema in calf, trace-1+ RLE   Skin:  Warm and dry.  Scarring on left calf         Data Review:    Labs:    Recent Results (from the past 24 hour(s))   POC G3 - PUL    Collection Time: 05/18/22  4:16 PM   Result Value Ref Range    FIO2 (POC) 3 %    pH (POC) 7.32 (L) 7.35 - 7.45      pCO2 (POC) 59.1 (H) 35.0 - 45.0 MMHG    pO2 (POC) 60 (L) 80 - 100 MMHG    HCO3 (POC) 30.6 (H) 22 - 26 MMOL/L    sO2 (POC) 87.9 (L) 92 - 97 %    Base excess (POC) 3.2 mmol/L    Site RIGHT RADIAL      Device: NASAL CANNULA      Allens test (POC) Positive      Specimen type (POC) ARTERIAL     BLOOD GAS, ARTERIAL    Collection Time: 05/18/22  8:13 PM   Result Value Ref Range    pH 7.36 7.35 - 7.45      PCO2 50 (H) 35 - 45 mmHg    PO2 86 80 - 100 mmHg    O2 SAT 96 92 - 97 %    BICARBONATE 28 (H) 22 - 26 mmol/L    BASE EXCESS 1.5 mmol/L    O2 METHOD NASAL CANNULA      O2 FLOW RATE 5.00 L/min    Sample source ARTERIAL      SITE RIGHT RADIAL      MAIKOL'S TEST YES     NT-PRO BNP    Collection Time: 05/19/22  5:20 AM   Result Value Ref Range    NT pro-BNP 30,762 (H) <450 PG/ML   PROCALCITONIN    Collection Time: 05/19/22  5:20 AM   Result Value Ref Range    Procalcitonin <0.05 ng/mL   LACTIC ACID    Collection Time: 05/19/22  5:20 AM   Result Value Ref Range    Lactic acid 0.9 0.4 - 2.0 MMOL/L   CBC WITH AUTOMATED DIFF    Collection Time: 05/19/22  5:20 AM   Result Value Ref Range    WBC 5.2 4.1 - 11.1 K/uL    RBC 3.53 (L) 4.10 - 5.70 M/uL HGB 10.9 (L) 12.1 - 17.0 g/dL    HCT 37.0 36.6 - 50.3 %    .8 (H) 80.0 - 99.0 FL    MCH 30.9 26.0 - 34.0 PG    MCHC 29.5 (L) 30.0 - 36.5 g/dL    RDW 16.0 (H) 11.5 - 14.5 %    PLATELET 186 684 - 685 K/uL    MPV 10.2 8.9 - 12.9 FL    NRBC 0.0 0  WBC    ABSOLUTE NRBC 0.00 0.00 - 0.01 K/uL    NEUTROPHILS 72 32 - 75 %    LYMPHOCYTES 14 12 - 49 %    MONOCYTES 12 5 - 13 %    EOSINOPHILS 1 0 - 7 %    BASOPHILS 1 0 - 1 %    IMMATURE GRANULOCYTES 0 0.0 - 0.5 %    ABS. NEUTROPHILS 3.7 1.8 - 8.0 K/UL    ABS. LYMPHOCYTES 0.7 (L) 0.8 - 3.5 K/UL    ABS. MONOCYTES 0.6 0.0 - 1.0 K/UL    ABS. EOSINOPHILS 0.1 0.0 - 0.4 K/UL    ABS. BASOPHILS 0.1 0.0 - 0.1 K/UL    ABS. IMM.  GRANS. 0.0 0.00 - 0.04 K/UL    DF SMEAR SCANNED      RBC COMMENTS ANISOCYTOSIS  1+        RBC COMMENTS MACROCYTOSIS  1+       METABOLIC PANEL, BASIC    Collection Time: 05/19/22  5:20 AM   Result Value Ref Range    Sodium 144 136 - 145 mmol/L    Potassium 4.0 3.5 - 5.1 mmol/L    Chloride 109 (H) 97 - 108 mmol/L    CO2 29 21 - 32 mmol/L    Anion gap 6 5 - 15 mmol/L    Glucose 136 (H) 65 - 100 mg/dL    BUN 41 (H) 6 - 20 MG/DL    Creatinine 1.51 (H) 0.70 - 1.30 MG/DL    BUN/Creatinine ratio 27 (H) 12 - 20      GFR est AA 53 (L) >60 ml/min/1.73m2    GFR est non-AA 44 (L) >60 ml/min/1.73m2    Calcium 9.6 8.5 - 10.1 MG/DL   POC G3 - PUL    Collection Time: 05/19/22  5:41 AM   Result Value Ref Range    pH (POC) 7.37 7.35 - 7.45      pCO2 (POC) 48.9 (H) 35.0 - 45.0 MMHG    pO2 (POC) 156 (H) 80 - 100 MMHG    HCO3 (POC) 28.5 (H) 22 - 26 MMOL/L    sO2 (POC) 99.3 (H) 92 - 97 %    Base excess (POC) 2.5 mmol/L    Site RIGHT RADIAL      Device: ROOM AIR      Allens test (POC) Positive      Specimen type (POC) ARTERIAL            Radiology:        Current Facility-Administered Medications   Medication Dose Route Frequency    artificial saliva (MOUTH KOTE) 1 Spray  1 Spray Oral PRN    sodium chloride (NS) flush 5-40 mL  5-40 mL IntraVENous Q8H    sodium chloride (NS) flush 5-40 mL  5-40 mL IntraVENous PRN    acetaminophen (TYLENOL) tablet 650 mg  650 mg Oral Q6H PRN    Or    acetaminophen (TYLENOL) suppository 650 mg  650 mg Rectal Q6H PRN    ondansetron (ZOFRAN ODT) tablet 4 mg  4 mg Oral Q8H PRN    Or    ondansetron (ZOFRAN) injection 4 mg  4 mg IntraVENous Q6H PRN    enoxaparin (LOVENOX) injection 30 mg  30 mg SubCUTAneous DAILY    bumetanide (BUMEX) injection 1 mg  1 mg IntraVENous Q12H    albuterol-ipratropium (DUO-NEB) 2.5 MG-0.5 MG/3 ML  3 mL Nebulization Q4H PRN    aspirin chewable tablet 81 mg  81 mg Oral DAILY    isosorbide mononitrate ER (IMDUR) tablet 30 mg  30 mg Oral 7am    mirtazapine (REMERON) tablet 7.5 mg  7.5 mg Oral QHS    hydroxypropyl methylcellulose (ISOPTO TEARS) 0.5 % ophthalmic solution 1 Drop  1 Drop Both Eyes PRN    dilTIAZem (CARDIZEM) 125 mg in dextrose 5% 125 mL infusion  0-15 mg/hr IntraVENous TITRATE       AUDIE Barahona MD     [facility] [address] [place] [address] 13, [address] [facility] [address]   [address]   [phone]

## 2022-05-19 NOTE — PROGRESS NOTES
Pulmonary    No distress this am  On RA currently  Never required bipap use over night per RN    Patient Vitals for the past 4 hrs:   BP Pulse Resp SpO2   22 0600 134/88 (!) 128 19 95 %   22 0500 (!) 130/98 91 19 99 %   Temp (24hrs), Av.2 °F (36.8 °C), Min:98 °F (36.7 °C), Max:98.2 °F (36.8 °C)      Intake/Output Summary (Last 24 hours) at 2022 0840  Last data filed at 2022 0600  Gross per 24 hour   Intake 148.22 ml   Output 0 ml   Net 148.22 ml     No distress  MM dry  No accessory use  Lungs clear anteriorl    Lab:  Recent Labs     22  0520 22  0618 22  1405   WBC 5.2 4.5 6.3   HGB 10.9* 11.7* 11.2*    199 227    142 142   K 4.0 4.4 3.9   * 107 107   CO2 29 31 29   BUN 41* 37* 34*   CREA 1.51* 1.61* 1.70*   * 159* 167*   CA 9.6 9.5 9.3   TBILI  --   --  0.4   LAC 0.9 1.8 1.8     ABG:  Recent Labs     22  0541 22  1616 22  0916 22  0001 22  0001   PHI 7.37 7.32* 7.31*   < > 7.30*   PCO2I 48.9* 59.1* 57.9*   < > 55.8*   PO2I 156* 60* 92   < > 127*   HCO3I 28.5* 30.6* 29.0*   < > 27.4*   SO2I 99.3* 87.9* 96.0   < > 98.4*   FIO2I  --  3 6  --  40    < > = values in this interval not displayed. Pulmonary Impression / Recommendations:  1. Acute on chronic respiratory failure with hypoxemia and hypercarbia secondary to decompensated congestive heart failure/volume overload  2. Atrial fibrillationrate controlled on Cardizem  3. Chronic kidney disease  4.   Dementia     Supplemental oxygen if needed  diuresis  afib rate control  cardiology following  palliative care to see    Will see again if needed    Brenda Adan MD

## 2022-05-19 NOTE — ROUTINE PROCESS
0730- Bedside and Verbal shift change report given to Radha Rousseau and Angelica RN (oncoming nurse) by Jessika Hernandez RN (offgoing nurse). Report included the following information SBAR, Kardex, ED Summary, Intake/Output, Recent Results and Cardiac Rhythm SA/S. Tachy. Pt on 3 liters NC    0730-Restraints discontinued    0800- Pulmonary rounded on patient    0900- patient ate 24% of breakfast with assistance, brief changed and patient turned. 1000- Markels  called to check on patient and was given update    1030- Speech Pathology rounded on patient, patient did well with assessment and was advanced from full liquid to easy to chew diet      1100- Hospitalist rounded on patient, patient is able to be transferred out of critical care unit    1144- Palliative rounded on patient, patient is pleasant and able to verbalize orientation to self, and place. Patient's wife will be called by Palliative team this afternoon to update her on his progress     1300- patient sitting in chair    1345- TRANSFER - OUT REPORT:    Verbal report given to OBED Devi(name) on Chris Pedraza  being transferred to [address] [place] [address](unit) for routine progression of care       Report consisted of patients Situation, Background, Assessment and   Recommendations(SBAR). Information from the following report(s) SBAR, Kardex, ED Summary, Intake/Output and Cardiac Rhythm SA/SR was reviewed with the receiving nurse. Lines:   Peripheral IV 05/18/22 Right;Mid Forearm (Active)   Site Assessment Clean, dry, & intact 05/19/22 0800   Phlebitis Assessment 0 05/19/22 0800   Infiltration Assessment 0 05/19/22 0800   Dressing Status Clean, dry, & intact 05/19/22 0800   Dressing Type Transparent 05/19/22 0800   Hub Color/Line Status Pink; Infusing 05/19/22 0800   Action Taken Open ports on tubing capped 05/19/22 0800   Alcohol Cap Used Yes 05/19/22 0800        Opportunity for questions and clarification was provided.       Patient transported with:   Monitor  O2 @ 3 liters

## 2022-05-19 NOTE — PROGRESS NOTES
[facility] Adult  Hospitalist Group                                                                                          Hospitalist Progress Note  Jay Al MD  Answering service: [phone] from in house phone        Date of Service:  2022  NAME:  Lucy Cruz  :  1931  MRN:  [id number]      Admission Summary:       Lucy Cruz is a 80 y.o. male with a pmhx hypertension, CKD, DM 2, dysphagia, falls, and dementia who presented via EMS for hypoxia, and tachycardia. Per chart record, heart rate was in the 150s, and he was hypoxic to 83%. Obtained from chart review due to patient with tachypnea, and inability to complete full sentences during my evaluation. He was previously hospitalized from 3/14/2022 to 3/18/2022 for LAMINE with hyperkalemia     In the ED, he was tachycardic to the 130s, with recorded SPO2 97 to 100% on BiPAP. Labs are significant for troponin 109, probnp 31,490,  PH 7.29, PCO2 58.5, and PO2 69. Echocardiogram was done, and showed depressed EF 30 to 50%. Chest x-ray significant for moderate pulmonary edema with bilateral pleural effusions. EKG shows atrial fibrillation with  with non-specific ST-T wave changes.     In the ED, he received duo nebs, 2 mg IV Bumex, 125 IV Solu-Medrol, and was placed on BiPAP. Between the time of my chart review, and evaluating the patient he had taken his BiPAP off due to not tolerating the mask. Nursing staff tried a nasal mask without success. Stat ABG ordered, and PCO2 was worsening so had a discussion with the patient, provided reassurance, treated anxiety with Seroquel, and reattempted Pap therapy. He did tolerate at this time, and ABG is improving, so will admit to [facility] at this time.     Seen by Intensivist and recommended non ICU bed    Interval history / Subjective:     He said he feels better, no left side chest pain, palpitation or diaphoresis      Assessment & Plan:     Acute hypoxic, hypercapnic respiratory failure due to pulmonary edema, pleural effusions   -elevated probnp  -has been on BiPAP for 5 hrs last night, now weaned off, on 3 l/m, SpO2 % monitor pulse ox to wean off oxygen   -s/p steroids   -continue diuresis with bumex, strict I&O, and daily weight  -chest x ray on 5/17 moderate pulmonary edema with bilateral pleural effusions  -echo ordered, and pending  -pulmonologist on board    Elevated troponin   -trend troponin  -on aspirin  -no left side chest pain  -cardiologist on board      Atrial fibrillation  -rate controlled on diltiazem gtt, and pt maintaining good MAP at this time  -discussion regarding risk benefit of anticoagulation due to hx frequent falls, and dementia  -echocardiogram pending  -CT head no acute process      CKD stage IIIa  -creatinine improving  -avoid nephrotoxin   -monitor renal function      Chornic dementia  -Patient with chronic dementia,   -continue neuro check and supportive care    Anemia of chronic disease   -Hgb 10.9  -stable    DNR: palliative care team          Code status: DNR  Prophylaxis: Lovenox  Care Plan discussed with: Patient and Nurse   Anticipated Disposition: SNF      Hospital Problems  Never Reviewed          Codes Class Noted POA    Acute respiratory failure with hypoxia and hypercarbia ([facility] [address] ICD-10-CM: J96.01, J96.02  ICD-9-CM: 518.81  5/18/2022 Unknown        SOB (shortness of breath) ICD-10-CM: R06.02  ICD-9-CM: 786.05  5/17/2022 Unknown                 Vital Signs:    Last 24hrs VS reviewed since prior progress note.  Most recent are:  Visit Vitals  /74   Pulse 77   Temp 98.2 °F (36.8 °C)   Resp 19   Ht 5' 2\" (1.575 m)   Wt 87.9 kg (193 lb 12.6 oz)   SpO2 95%   BMI 35.44 kg/m²         Intake/Output Summary (Last 24 hours) at 5/19/2022 1041  Last data filed at 5/19/2022 8243  Gross per 24 hour   Intake 298.22 ml   Output 0 ml   Net 298.22 ml        Physical Examination:     I had a face to face encounter with this patient and independently examined them on 5/19/2022 as outlined below:          Constitutional:  No acute distress, cooperative, pleasant    ENT:  Oral mucosa moist, oropharynx benign. Resp:  Decrease bronchial breath sound bilaterally. No wheezing/rhonchi/rales. No accessory muscle use. CV:  Regular rhythm, normal rate, no murmurs, gallops, rubs    GI:  Soft, non distended, non tender. normoactive bowel sounds, no hepatosplenomegaly     Musculoskeletal:  No edema,     Neurologic:  Moves all extremities, conscious and alert, answer questions, moves extremities             Data Review:    Review and/or order of clinical lab test  Review and/or order of tests in the radiology section of CPT  Review and/or order of tests in the medicine section of CPT      Labs:     Recent Labs     05/19/22 0520 05/18/22 0618   WBC 5.2 4.5   HGB 10.9* 11.7*   HCT 37.0 40.2    199     Recent Labs     05/19/22 0520 05/18/22 0618 05/17/22  1405    142 142   K 4.0 4.4 3.9   * 107 107   CO2 29 31 29   BUN 41* 37* 34*   CREA 1.51* 1.61* 1.70*   * 159* 167*   CA 9.6 9.5 9.3     Recent Labs     05/17/22  1405   ALT 36   AP 76   TBILI 0.4   TP 7.7   ALB 3.2*   GLOB 4.5*     No results for input(s): INR, PTP, APTT, INREXT in the last 72 hours. No results for input(s): FE, TIBC, PSAT, FERR in the last 72 hours. No results found for: FOL, RBCF   Recent Labs     05/18/22 2013   PH 7.36   PCO2 50*   PO2 86     No results for input(s): CPK, CKNDX, TROIQ in the last 72 hours.     No lab exists for component: CPKMB  Lab Results   Component Value Date/Time    Cholesterol, total 129 01/05/2022 04:48 AM    HDL Cholesterol 35 01/05/2022 04:48 AM    LDL, calculated 67.8 01/05/2022 04:48 AM    Triglyceride 131 01/05/2022 04:48 AM    CHOL/HDL Ratio 3.7 01/05/2022 04:48 AM     Lab Results   Component Value Date/Time    Glucose (POC) 94 01/06/2022 05:45 AM    Glucose (POC) 84 01/06/2022 01:31 AM    Glucose (POC) 111 01/05/2022 04:39 PM    Glucose (POC) 128 (H) 01/05/2022 11:47 AM    Glucose (POC) 90 01/05/2022 07:24 AM     No results found for: COLOR, APPRN, SPGRU, REFSG, JOSE G, PROTU, GLUCU, KETU, BILU, UROU, STEPHANIE, LEUKU, GLUKE, EPSU, BACTU, WBCU, RBCU, CASTS, UCRY      Medications Reviewed:     Current Facility-Administered Medications   Medication Dose Route Frequency    sodium chloride (NS) flush 5-40 mL  5-40 mL IntraVENous Q8H    sodium chloride (NS) flush 5-40 mL  5-40 mL IntraVENous PRN    acetaminophen (TYLENOL) tablet 650 mg  650 mg Oral Q6H PRN    Or    acetaminophen (TYLENOL) suppository 650 mg  650 mg Rectal Q6H PRN    ondansetron (ZOFRAN ODT) tablet 4 mg  4 mg Oral Q8H PRN    Or    ondansetron (ZOFRAN) injection 4 mg  4 mg IntraVENous Q6H PRN    enoxaparin (LOVENOX) injection 30 mg  30 mg SubCUTAneous DAILY    bumetanide (BUMEX) injection 1 mg  1 mg IntraVENous Q12H    albuterol-ipratropium (DUO-NEB) 2.5 MG-0.5 MG/3 ML  3 mL Nebulization Q4H PRN    aspirin chewable tablet 81 mg  81 mg Oral DAILY    isosorbide mononitrate ER (IMDUR) tablet 30 mg  30 mg Oral 7am    mirtazapine (REMERON) tablet 7.5 mg  7.5 mg Oral QHS    hydroxypropyl methylcellulose (ISOPTO TEARS) 0.5 % ophthalmic solution 1 Drop  1 Drop Both Eyes PRN    dilTIAZem (CARDIZEM) 125 mg in dextrose 5% 125 mL infusion  0-15 mg/hr IntraVENous TITRATE     ______________________________________________________________________  EXPECTED LENGTH OF STAY: - - -  ACTUAL LENGTH OF STAY:          2                 Wilnette Cheadle, MD

## 2022-05-19 NOTE — PROGRESS NOTES
SARAI: Anticipate discharge back to LTC at [facility] ([phone]). Transportation likely BLS. Reason for Admission:  SOB and acute respiratory failure with hypoxia and hypercarbia                     RUR Score: 18%                 PCP: First and Last name:   Radha Hernandez MD     [facility]   Are you a current patient: Yes/No: yes   Approximate date of last visit:    Can you participate in a virtual visit if needed:     Do you (patient/family) have any concerns for transition/discharge? None mentioned               Plan for utilizing home health:  N/A     Current Advanced Directive/Advance Care Plan:  Full Code      Healthcare Decision Maker:   Click here to complete [address] including selection of the Healthcare Decision Maker Relationship (ie \"Primary\")            Primary Decision Maker (Active): Shamar Arcos - Spouse - [phone]    Transition of Care Plan:  CM contacted patient's wife as patient has dementia. Demographics confirmed. Per wife, patient has resided in [address] [place] [address] at [facility] for about one year. DME: cane, walker, electric chair. Hx: HTN, CKD, diabetes type II, dysphagia, dementia. Patient accesses medical care at the [facility]. Patient receives assistance with ADLs, including medication management, at [facility]. Patient will most likely need a rapid covid test prior to return. CM will continue to follow for discharge needs. Care Management Interventions  PCP Verified by CM:  Yes (Dr. Maki Hoang)  Mode of Transport at Discharge: BLS  Transition of Care Consult (CM Consult): [facility] ([facility])  [address] ElianArnoldo Gilliam: No  Discharge Durable Medical Equipment: No  Physical Therapy Consult: Yes  Occupational Therapy Consult: Yes  Speech Therapy Consult: Yes  Support Systems: Spouse/Significant [facility]  Confirm Follow Up Transport: Other (see comment) (BLS)  The Plan for Transition of Care is Related to the Following Treatment Goals : back to LTC  Discharge Location  Patient Expects to be Discharged to[de-identified] [address] [place] [address] Dr Sanchez, [address] [facility]  [phone]

## 2022-05-19 NOTE — PROGRESS NOTES
Problem: Dysphagia (Adult)  Goal: *Acute Goals and Plan of Care (Insert Text)  Description: Speech Therapy Goals  Initiated 5/18/2022  1. Patient will tolerate full liquid diet free of overt s/s aspiration or adverse response within 7 days. Outcome: Progressing Towards Goal     SPEECH LANGUAGE PATHOLOGY DYSPHAGIA TREATMENT/DISCHARGE  Patient: Max Munguia (21 y.o. male)  Date: 5/19/2022  Diagnosis: SOB (shortness of breath) [R06.02]  Acute respiratory failure with hypoxia and hypercarbia (HCC) [J96.01, J96.02] <principal problem not specified>       Precautions:  Fall,Skin    ASSESSMENT:  Pt without any difficulty on this date with any PO. Respiratory status has largely improved. Xerostomia and edentulous state is pt's largest barrier and therefore ordered saliva substitute and easy to chew diet. No further SLP needs. Will sign of. PLAN:  --easy to chew diet/thin liquids  --good oral care  --saliva substitute    Patient will be discharged from acute skilled speech therapy at this time. Rationale for discharge:  Goals achieved    Discharge Recommendations: To Be Determined     SUBJECTIVE:   Patient stated, \"I want more ace hargrove. OBJECTIVE:   Cognitive and Communication Status:  Neurologic State: Alert,Confused  Orientation Level: Oriented to person,Disoriented to time,Disoriented to situation,Oriented to place  Cognition: Follows commands,Decreased attention/concentration,Decreased command following    Perception: Appears intact    Perseveration: No perseveration noted    Safety/Judgement: Awareness of environment,Decreased insight into deficits,Fall prevention  NOMS:   The NOMS functional outcome measure was used to quantify this patient's level of swallowing impairment.   Based on the NOMS, the patient was determined to be at level 7 for swallow function     NOMS Swallowing Levels:  Level 1 (CN): NPO  Level 2 (CM): NPO but takes consistency in therapy  Level 3 (CL): Takes less than 50% of nutrition p.o. and continues with nonoral feedings; and/or safe with mod cues; and/or max diet restriction  Level 4 (CK): Safe swallow but needs mod cues; and/or mod diet restriction; and/or still requires some nonoral feeding/supplements  Level 5 (CJ): Safe swallow with min diet restriction; and/or needs min cues  Level 6 (CI): Independent with p.o.; rare cues; usually self cues; may need to avoid some foods or needs extra time  Level 7 ([address] [place] [address]): Independent for all p.o.  MARLI. (2003). National Outcomes Measurement System (NOMS): Adult Speech-Language Pathology User's Guide. Pain:  Pain Scale 1: Numeric (0 - 10)  Pain Intensity 1: 0       After treatment:   Call bell within reach and Nursing notified    COMMUNICATION/EDUCATION:     The patient's plan of care including recommendations, planned interventions, and recommended diet changes were discussed with: Registered nurse.      LIA Anguiano  Time Calculation: 10 mins

## 2022-05-19 NOTE — PROGRESS NOTES
[place] [phone] - [phone] (COPE)          GOALS OF CARE: DNR, DDNR signed by patient (patient's spouse also aware and in support of patient's decision for DNR status), held discussion with her via phone with patient input. TREATMENT PREFERENCES:   Code Status: DNR    Advance Care Planning:  [x] The [facility] Interdisciplinary Team has updated the ACP Navigator with [address] and Patient Capacity    Primary Decision Maker ([address]):   Relationship to patient:  [] Named in a scanned document   [x] Legal Next of Kin  [] [address] Meeting Documentation    Participants: Jason Becerril NP, spouse Michel Dykes     Discussion: The Palliative Medicine SW and Jason Becerril NP, met with the patient and his wife Michel Dykes present via speaker phone- we attempted to have joshua Guillermo on phone x2 but unable to reach, meeting held w/ patient and spouse Julio Cameron. Palliative Medicine provided update on the patient's medical condition, patient is much more alert and interactive today- engages appropriately, providing input into his care. He shares he wants to work with PT, asking when he can return to the [id number] [place] [address], aware he is at [facility] and he is sick, but reports that his breathing is better. We discuss Code Status, patient does clearly verbalize he would want everything done, however, would not want CPR/shock/intubation in the event his heart stopped- he also verbalizes not wanting to be on ventilator, but is okay with mask (BiPAP). Also discussed with patient's spouse, she hears his verbalization of this during our conversation, and she supports the decision for DNR/DNI. Continue all other full measures including BiPAP. We share with her will get his wishes reflected. Patient signed DDNR, conversation also held with spouse and she is in support of decision for DNR/DNI. Goal for recovery from acute illness, continue current care.      Outcome / Plan: DNR, goal for recovery     Palliative Medicine will follow peripherally. Please call if we can be further support in the patient's care.      Thank you for including Palliative Medicine in the care of Nickie [address] [place] [address]  [phone]

## 2022-05-19 NOTE — PROGRESS NOTES
Problem: Mobility Impaired (Adult and Pediatric)  Goal: *Acute Goals and Plan of Care (Insert Text)  Description: FUNCTIONAL STATUS PRIOR TO ADMISSION: Patient ambulated with a rolling walker. HOME SUPPORT PRIOR TO ADMISSION: The patient lived in a LTC facility. Physical Therapy Goals  Initiated 5/18/2022  1. Patient will move from supine to sit and sit to supine , scoot up and down, and roll side to side in bed with supervision/set-up within 7 day(s). 2.  Patient will transfer from bed to chair and chair to bed with supervision/set-up using the least restrictive device within 7 day(s). 3.  Patient will perform sit to stand with supervision/set-up within 7 day(s). 4.  Patient will ambulate with supervision/set-up for 150 feet with the least restrictive device within 7 day(s). Outcome: Progressing Towards Goal       PHYSICAL THERAPY TREATMENT  Patient: Max Munguia (53 y.o. male)  Date: 5/19/2022  Diagnosis: SOB (shortness of breath) [R06.02]  Acute respiratory failure with hypoxia and hypercarbia (HCC) [J96.01, J96.02] <principal problem not specified>       Precautions: Fall,Skin  Chart, physical therapy assessment, plan of care and goals were reviewed. ASSESSMENT  Patient continues with skilled PT services and is progressing towards goals. Pt agreeable to therapy, wanting to get OOB. Pt was able to ambulate a short distance to chair. Noted crepitus with bed mobility and sit to stand. Pt reports SOB post task. VSS    Current Level of Function Impacting Discharge (mobility/balance): Ax2    Other factors to consider for discharge: h/o dementia, decrease mobility          PLAN :  Patient continues to benefit from skilled intervention to address the above impairments. Continue treatment per established plan of care. to address goals.     Recommendation for discharge: (in order for the patient to meet his/her long term goals)  Therapy up to 5 days/week in SNF setting vs LTC with PT    This discharge recommendation:  Has not yet been discussed the attending provider and/or case management    IF patient discharges home will need the following DME: patient owns DME required for discharge       SUBJECTIVE:   Patient stated you want me to go to the chair.     OBJECTIVE DATA SUMMARY:   Critical Behavior:  Neurologic State: Alert,Confused  Orientation Level: Oriented to person,Disoriented to time,Disoriented to situation,Oriented to place  Cognition: Follows commands,Decreased attention/concentration,Decreased command following  Safety/Judgement: Awareness of environment,Decreased insight into deficits,Fall prevention  Functional Mobility Training:  Bed Mobility:     Supine to Sit: Moderate assistance              Transfers:  Sit to Stand: Moderate assistance  Stand to Sit: Minimum assistance        Bed to Chair: Contact guard assistance;Minimum assistance;Assist x2 (with rolling walker)                    Balance:  Sitting: Intact  Standing: Impaired  Standing - Static: Fair  Standing - Dynamic : Fair  Ambulation/Gait Training:                    Stairs: Therapeutic Exercises:     Pain Rating:  none    Activity Tolerance:   observed SOB with activity    After treatment patient left in no apparent distress:   Sitting in chair, Call bell within reach, and Bed / chair alarm activated    COMMUNICATION/COLLABORATION:   The patients plan of care was discussed with: Registered nurse.      Jerl Holstein, PTA   Time Calculation: 23 mins

## 2022-05-19 NOTE — PROGRESS NOTES
1930: TRANSFER - IN REPORT:    Verbal report received from April,RN(name) on Marie Larger  being received from 4W(unit) for ordered procedure    Report consisted of patients Situation, Background, Assessment and   Recommendations(SBAR). Information from the following report(s) SBAR, Kardex, Intake/Output and MAR was reviewed with the receiving nurse. Opportunity for questions and clarification was provided. Assessment completed upon patients arrival to unit and care assumed. 0300: Pt agitated and confused. Continued to take everything off including bipap and became combative towards staff in attempts to replace oxygen and/or bipap. NP notified orders received for meds and restraints/sitter prn.     0730: Bedside shift change report given to OBED Dominguez (oncoming nurse) by [facility] (offgoing nurse). Report included the following information SBAR and Kardex.

## 2022-05-20 ENCOUNTER — APPOINTMENT (OUTPATIENT)
Dept: GENERAL RADIOLOGY | Age: 87
DRG: 291 | End: 2022-05-20
Attending: HOSPITALIST
Payer: MEDICARE

## 2022-05-20 LAB
ALBUMIN SERPL-MCNC: 3 G/DL (ref 3.5–5)
ALBUMIN/GLOB SERPL: 0.7 {RATIO} (ref 1.1–2.2)
ALP SERPL-CCNC: 66 U/L (ref 45–117)
ALT SERPL-CCNC: 30 U/L (ref 12–78)
ANION GAP SERPL CALC-SCNC: 6 MMOL/L (ref 5–15)
AST SERPL-CCNC: 28 U/L (ref 15–37)
BILIRUB SERPL-MCNC: 0.5 MG/DL (ref 0.2–1)
BNP SERPL-MCNC: ABNORMAL PG/ML
BUN SERPL-MCNC: 40 MG/DL (ref 6–20)
BUN/CREAT SERPL: 26 (ref 12–20)
CALCIUM SERPL-MCNC: 9.3 MG/DL (ref 8.5–10.1)
CHLORIDE SERPL-SCNC: 104 MMOL/L (ref 97–108)
CO2 SERPL-SCNC: 30 MMOL/L (ref 21–32)
COMMENT, HOLDF: NORMAL
CREAT SERPL-MCNC: 1.56 MG/DL (ref 0.7–1.3)
ERYTHROCYTE [DISTWIDTH] IN BLOOD BY AUTOMATED COUNT: 15.9 % (ref 11.5–14.5)
GLOBULIN SER CALC-MCNC: 4.5 G/DL (ref 2–4)
GLUCOSE SERPL-MCNC: 134 MG/DL (ref 65–100)
HCT VFR BLD AUTO: 38.6 % (ref 36.6–50.3)
HGB BLD-MCNC: 11.5 G/DL (ref 12.1–17)
MAGNESIUM SERPL-MCNC: 2.2 MG/DL (ref 1.6–2.4)
MCH RBC QN AUTO: 30.9 PG (ref 26–34)
MCHC RBC AUTO-ENTMCNC: 29.8 G/DL (ref 30–36.5)
MCV RBC AUTO: 103.8 FL (ref 80–99)
NRBC # BLD: 0 K/UL (ref 0–0.01)
NRBC BLD-RTO: 0 PER 100 WBC
PHOSPHATE SERPL-MCNC: 3.2 MG/DL (ref 2.6–4.7)
PLATELET # BLD AUTO: 259 K/UL (ref 150–400)
PMV BLD AUTO: 11.1 FL (ref 8.9–12.9)
POTASSIUM SERPL-SCNC: 4.4 MMOL/L (ref 3.5–5.1)
PROCALCITONIN SERPL-MCNC: <0.05 NG/ML
PROT SERPL-MCNC: 7.5 G/DL (ref 6.4–8.2)
RBC # BLD AUTO: 3.72 M/UL (ref 4.1–5.7)
SAMPLES BEING HELD,HOLD: NORMAL
SODIUM SERPL-SCNC: 140 MMOL/L (ref 136–145)
WBC # BLD AUTO: 5.3 K/UL (ref 4.1–11.1)

## 2022-05-20 PROCEDURE — 65660000001 HC RM ICU INTERMED STEPDOWN

## 2022-05-20 PROCEDURE — 99232 SBSQ HOSP IP/OBS MODERATE 35: CPT | Performed by: SPECIALIST

## 2022-05-20 PROCEDURE — 94760 N-INVAS EAR/PLS OXIMETRY 1: CPT

## 2022-05-20 PROCEDURE — 74011000258 HC RX REV CODE- 258: Performed by: FAMILY MEDICINE

## 2022-05-20 PROCEDURE — 71045 X-RAY EXAM CHEST 1 VIEW: CPT

## 2022-05-20 PROCEDURE — 85027 COMPLETE CBC AUTOMATED: CPT

## 2022-05-20 PROCEDURE — 80053 COMPREHEN METABOLIC PANEL: CPT

## 2022-05-20 PROCEDURE — 74011250637 HC RX REV CODE- 250/637: Performed by: STUDENT IN AN ORGANIZED HEALTH CARE EDUCATION/TRAINING PROGRAM

## 2022-05-20 PROCEDURE — 74011250636 HC RX REV CODE- 250/636: Performed by: FAMILY MEDICINE

## 2022-05-20 PROCEDURE — 84100 ASSAY OF PHOSPHORUS: CPT

## 2022-05-20 PROCEDURE — 74011250637 HC RX REV CODE- 250/637: Performed by: FAMILY MEDICINE

## 2022-05-20 PROCEDURE — 74011000250 HC RX REV CODE- 250: Performed by: FAMILY MEDICINE

## 2022-05-20 PROCEDURE — 97530 THERAPEUTIC ACTIVITIES: CPT

## 2022-05-20 PROCEDURE — 74011250637 HC RX REV CODE- 250/637: Performed by: NURSE PRACTITIONER

## 2022-05-20 PROCEDURE — 83880 ASSAY OF NATRIURETIC PEPTIDE: CPT

## 2022-05-20 PROCEDURE — 36415 COLL VENOUS BLD VENIPUNCTURE: CPT

## 2022-05-20 PROCEDURE — 77010033678 HC OXYGEN DAILY

## 2022-05-20 PROCEDURE — 83735 ASSAY OF MAGNESIUM: CPT

## 2022-05-20 PROCEDURE — 84145 PROCALCITONIN (PCT): CPT

## 2022-05-20 RX ORDER — CARVEDILOL 6.25 MG/1
6.25 TABLET ORAL 2 TIMES DAILY WITH MEALS
Status: DISCONTINUED | OUTPATIENT
Start: 2022-05-20 | End: 2022-05-24 | Stop reason: HOSPADM

## 2022-05-20 RX ADMIN — SODIUM CHLORIDE, PRESERVATIVE FREE 10 ML: 5 INJECTION INTRAVENOUS at 06:45

## 2022-05-20 RX ADMIN — ACETAMINOPHEN 650 MG: 325 TABLET ORAL at 16:20

## 2022-05-20 RX ADMIN — ENOXAPARIN SODIUM 30 MG: 100 INJECTION SUBCUTANEOUS at 09:10

## 2022-05-20 RX ADMIN — ASPIRIN 81 MG CHEWABLE TABLET 81 MG: 81 TABLET CHEWABLE at 09:10

## 2022-05-20 RX ADMIN — MIRTAZAPINE 7.5 MG: 15 TABLET, FILM COATED ORAL at 21:32

## 2022-05-20 RX ADMIN — SODIUM CHLORIDE, PRESERVATIVE FREE 10 ML: 5 INJECTION INTRAVENOUS at 14:48

## 2022-05-20 RX ADMIN — ACETAMINOPHEN 650 MG: 325 TABLET ORAL at 22:12

## 2022-05-20 RX ADMIN — DILTIAZEM HYDROCHLORIDE 5 MG/HR: 5 INJECTION, SOLUTION INTRAVENOUS at 16:14

## 2022-05-20 RX ADMIN — ISOSORBIDE MONONITRATE 30 MG: 30 TABLET, EXTENDED RELEASE ORAL at 09:10

## 2022-05-20 RX ADMIN — BUMETANIDE 1 MG: 0.25 INJECTION INTRAMUSCULAR; INTRAVENOUS at 09:10

## 2022-05-20 RX ADMIN — BUMETANIDE 1 MG: 0.25 INJECTION INTRAMUSCULAR; INTRAVENOUS at 21:33

## 2022-05-20 RX ADMIN — CARVEDILOL 6.25 MG: 6.25 TABLET, FILM COATED ORAL at 17:52

## 2022-05-20 RX ADMIN — CARVEDILOL 6.25 MG: 6.25 TABLET, FILM COATED ORAL at 11:27

## 2022-05-20 NOTE — PROGRESS NOTES
Physician Progress Note      Curtis Moreland  CSN #:                  [id number]  :                       1931  ADMIT DATE:       2022 1:38 PM  [address] [place] DATE:  RESPONDING  PROVIDER #:        Kylee Mott MD          QUERY TEXT:    Dear attending,    Patient admitted with documentation of Acute hypoxic, hypercapnic respiratory failure. Per the pulmonologist, the patient has Acute on chronic respiratory failure with hypoxemia and hypercarbia However, the patient doesn?t appear to have been on oxygen PTA. In order to support the diagnosis of chronic respiratory failure, please include additional clinical indicators in your documentation. Or please document if the diagnosis of chronic respiratory failure has been ruled out after further study. The medical record reflects the following:  Risk Factors: To [facility] with pulmonary edema  Clinical Indicators:  -Per pulmonary- He was noted to be tachycardic and had to be placed on BiPAP for respiratory distress. Patient's arterial blood gas showed hypoxemia with acute on chronic hypercarbia chest x-ray was suggestive of pulmonary edema and proBNP was markedly elevated patient received IV diuretics was initially placed on noninvasive positive pressure ventilation but was then weaned to nasal oxygen. Hospital physician concerned about increasing PCO2 with relatively normal pH patient has been transferred to the ICU for possible requirements of BiPAP. Arterial blood gas: On nasal oxygen at 3 L/min-pH 7.32, PCO2 59.1, PO2 60  Pulmonary Impression / Recommendations:  Acute on chronic respiratory failure with hypoxemia and hypercarbia secondary to decompensated congestive heart failure/volume overload  Supplemental oxygen to maintain saturations of 90 to 92% BiPAP may be helpful if work of breathing is elevated or hypercapnia worsens with falling pH.   Treatment: Monitor vital signs, labwork, imaging, pulse oximetry,Pulmonary consult, BIPAP, oxygen      Thank you,  Yuan Wyatt RN, BSN  Clinical documentation Improvement  [phone]  Options provided:  -- Chronic Respiratory Failure as evidenced by, Please document evidence. -- Chronic  Respiratory Failure ruled out after study  -- Other - I will add my own diagnosis  -- Disagree - Not applicable / Not valid  -- Disagree - Clinically unable to determine / Unknown  -- Refer to Clinical Documentation Reviewer    PROVIDER RESPONSE TEXT:    Provider is clinically unable to determine a response to this query.     Query created by: Suellen Rausch on 5/20/2022 1:51 PM      Electronically signed by:  Anita Williamson MD 5/20/2022 5:59 PM

## 2022-05-20 NOTE — PROGRESS NOTES
1930  Pt has no IV access, per day shift RN there were several attempts made to get access including calling endoscopy, cath lab, CT after day and night shift staff nurses  2040  Critical care transport called, unavailable and unable to give ETA. PACU unavailable until after 2300  RRT nurse asked to look for access, will try once available  2200  NP Dominic messaged, attempts made to place IV unsuccessful. 2245  Orders received for anaesthesiologist to come place access  2300  EJ placed and dilt drip restarted  Bedside and Verbal shift change report given to Carter Javed (oncoming nurse) by Dusty Graham (offgoing nurse).  Report included the following information SBAR, Kardex, MAR, Accordion and Cardiac Rhythm SR.

## 2022-05-20 NOTE — PROGRESS NOTES
Cardiology Progress Note            Admit Date: 5/17/2022  Admit Diagnosis: SOB (shortness of breath) [R06.02]  Acute respiratory failure with hypoxia and hypercarbia (HCC) [J96.01, J96.02]  Date: 5/20/2022     Time: 10:05 AM                  CARDIOLOGY ATTENDING ATTESTATION    CONSULT/PROGRESS NOTE      Patient seen on the day of progress note and examined  and agree with Advance Practice Provider (KONSTANTIN, NP,PA)  assessment and plans. Brief HPI:Patient is a 80year old male with a hx of HTN, CKD, DM, dementia, and dysphagia. He presented to hospital with hypoxia and tachycardia. O2 sats 83%, HR in 150s per EMS. In ED EKG reviewed and was AFlutter with HR 140s. ProBNP 59451, today at <35,000. CXR showed mod pulm edema and bilateral effusions. He was given IV Bumex 2mg and Solumedrol. Initially on BiPAP. Placed on Diltiazem gtt for afib/afl. He has converted to SR overnight    More awake today and interactive. He tells me his shortness of breath is better. No chest pain is reported. A/P: 1. Shortness of breath: Probably multifactorial by point-of-care echocardiogram revealing ejection fraction between 30 and 50%. Still waiting for transthoracic echocardiogram.    Start Coreg at this time continue IV Bumex and Imdur for now. 2.  Paroxysmal atrial flutterfibrillation: Start Coreg. Wean off Cardizem IV. In my opinion the patient is not a good candidate for oral anticoagulation. 3.  Abdominal aortic aneurysm: 5.6 at last evaluation. Consider vascular consult. 4.  Hypertension: Well-controlled. BP Readings from Last 3 Encounters:   05/20/22 127/84   03/18/22 112/75   01/06/22 139/79       Pulse Readings from Last 3 Encounters:   05/20/22 85   03/18/22 87   01/06/22 75       Neck: no JVD  Heart: irregularly irregular rhythm  Lungs: diminished breath sounds R anterior, L anterior  Abdomen: soft, non-tender. Bowel sounds normal. No masses,  no organomegaly  Extremities: edema 1+b/l      01/04/22    ECHO ADULT COMPLETE 01/05/2022 1/5/2022    Interpretation Summary    Left Ventricle: Left ventricle size is normal. Normal wall thickness. Normal wall motion. Low normal left ventricular systolic function with a visually estimated EF of 50 - 55%. Normal diastolic function.   Aortic Valve: Mild sclerosis of the aortic valve cusps. Mild stenosis.   Mitral Valve: Mild transvalvular regurgitation.   Left Atrium: Left atrium is moderately dilated. Signed by: Vikki Gomez MD on 1/5/2022  2:33 PM        No results found for: CPK, RCK1, RCK2, RCK3, RCK4, CKMB, CKNDX, CKND1, TROPT, TROIQ, BNPP, BNP    Lab Results   Component Value Date/Time    Creatinine 1.56 (H) 05/20/2022 12:24 AM       Lab Results   Component Value Date/Time    HGB 11.5 (L) 05/20/2022 12:24 AM                         Subjective:  Patient lost IV access last evening, required EJ placement. While Diltiazem gtt was off his HR went into 140s. Denies CP, or worsening SOB. He is upset this morning about having to sit upright. Assessment and Plan     1. Atrial flutter with RVR: initial presentation with -150s. On Diltiazem gtt converted converted to SR. Episodes overnight of atrial flutter with RVR likely d/t Diltiazem gtt infusion held d/t access issues. This am he is having SR with PACs, and some brief bursts of AFL, rate now 90s. Continue on Diltiazem gtt. Add Coreg 6.25mg bid. No OAC at this time.      2. Hypercapnic respiratory failure/elevated BNP: I/O +300 yesterday. Creatinine 1.56 up slightly from 1.51 yest. BNP 30,940. cont on Bumex 1mg IV q12hrs. Echo is ordered. Strict I/O, daily weights. With lower EF per POC echo in ED add Coreg, cont Imdur for now. Hold off on ACE d/t renal function.      3. HTN: stable, 120s/80s to 140s/70s     4.  AAA: consider vascular evaluation      No results found for: YANN   Past Medical History:   Diagnosis Date    Dementia [facility])       Social History     Tobacco Use    Smoking status: Not on file    Smokeless tobacco: Not on file   Substance Use Topics    Alcohol use: Not on file    Drug use: Not on file             Objective:     Physical Exam:                Visit Vitals  /84 (BP 1 Location: Right upper arm, BP Patient Position: At rest;Semi fowlers)   Pulse 85   Temp 97.8 °F (36.6 °C)   Resp 24   Ht 5' 2\" (1.575 m)   Wt 187 lb 12.8 oz (85.2 kg)   SpO2 100%   BMI 34.35 kg/m²        General Appearance:   Well developed, alert, and   individual in no acute distress. Ears/Nose/Mouth/Throat:    Hearing grossly normal.        Neck:  Supple. EJ in place   Chest:    Lungs clear to auscultation bilaterally. Cardiovascular:   irregularly irregular rate and rhythm, S1, S2 normal, no murmur. Abdomen:    Soft, non-tender, bowel sounds are active. Extremities:  left leg with 1+ edema in calf, trace RLE   Skin:  Warm and dry. Scarring on left calf         Data Review:    Labs:    Recent Results (from the past 24 hour(s))   NT-PRO BNP    Collection Time: 05/20/22 12:24 AM   Result Value Ref Range    NT pro-BNP 30,940 (H) <450 PG/ML   PROCALCITONIN    Collection Time: 05/20/22 12:24 AM   Result Value Ref Range    Procalcitonin <4.77 ng/mL   METABOLIC PANEL, COMPREHENSIVE    Collection Time: 05/20/22 12:24 AM   Result Value Ref Range    Sodium 140 136 - 145 mmol/L    Potassium 4.4 3.5 - 5.1 mmol/L    Chloride 104 97 - 108 mmol/L    CO2 30 21 - 32 mmol/L    Anion gap 6 5 - 15 mmol/L    Glucose 134 (H) 65 - 100 mg/dL    BUN 40 (H) 6 - 20 MG/DL    Creatinine 1.56 (H) 0.70 - 1.30 MG/DL    BUN/Creatinine ratio 26 (H) 12 - 20      GFR est AA 51 (L) >60 ml/min/1.73m2    GFR est non-AA 42 (L) >60 ml/min/1.73m2    Calcium 9.3 8.5 - 10.1 MG/DL    Bilirubin, total 0.5 0.2 - 1.0 MG/DL    ALT (SGPT) 30 12 - 78 U/L    AST (SGOT) 28 15 - 37 U/L    Alk.  phosphatase 66 45 - 117 U/L    Protein, total 7.5 6.4 - 8.2 g/dL    Albumin 3.0 (L) 3.5 - 5.0 g/dL    Globulin 4.5 (H) 2.0 - 4.0 g/dL    A-G Ratio 0.7 (L) 1.1 - 2.2     CBC W/O DIFF    Collection Time: 05/20/22 12:24 AM   Result Value Ref Range    WBC 5.3 4.1 - 11.1 K/uL    RBC 3.72 (L) 4.10 - 5.70 M/uL    HGB 11.5 (L) 12.1 - 17.0 g/dL    HCT 38.6 36.6 - 50.3 %    .8 (H) 80.0 - 99.0 FL    MCH 30.9 26.0 - 34.0 PG    MCHC 29.8 (L) 30.0 - 36.5 g/dL    RDW 15.9 (H) 11.5 - 14.5 %    PLATELET 463 479 - 928 K/uL    MPV 11.1 8.9 - 12.9 FL    NRBC 0.0 0  WBC    ABSOLUTE NRBC 0.00 0.00 - 0.01 K/uL   MAGNESIUM    Collection Time: 05/20/22 12:24 AM   Result Value Ref Range    Magnesium 2.2 1.6 - 2.4 mg/dL   PHOSPHORUS    Collection Time: 05/20/22 12:24 AM   Result Value Ref Range    Phosphorus 3.2 2.6 - 4.7 MG/DL   SAMPLES BEING HELD    Collection Time: 05/20/22 12:24 AM   Result Value Ref Range    SAMPLES BEING HELD 1blu     COMMENT        Add-on orders for these samples will be processed based on acceptable specimen integrity and analyte stability, which may vary by analyte.           Radiology:        Current Facility-Administered Medications   Medication Dose Route Frequency    carvediloL (COREG) tablet 6.25 mg  6.25 mg Oral BID WITH MEALS    artificial saliva (MOUTH KOTE) 1 Spray  1 Spray Oral PRN    sodium chloride (NS) flush 5-40 mL  5-40 mL IntraVENous Q8H    sodium chloride (NS) flush 5-40 mL  5-40 mL IntraVENous PRN    acetaminophen (TYLENOL) tablet 650 mg  650 mg Oral Q6H PRN    Or    acetaminophen (TYLENOL) suppository 650 mg  650 mg Rectal Q6H PRN    ondansetron (ZOFRAN ODT) tablet 4 mg  4 mg Oral Q8H PRN    Or    ondansetron (ZOFRAN) injection 4 mg  4 mg IntraVENous Q6H PRN    enoxaparin (LOVENOX) injection 30 mg  30 mg SubCUTAneous DAILY    bumetanide (BUMEX) injection 1 mg  1 mg IntraVENous Q12H    albuterol-ipratropium (DUO-NEB) 2.5 MG-0.5 MG/3 ML  3 mL Nebulization Q4H PRN    aspirin chewable tablet 81 mg  81 mg Oral DAILY    isosorbide mononitrate ER (IMDUR) tablet 30 mg  30 mg Oral 7am    mirtazapine (REMERON) tablet 7.5 mg  7.5 mg Oral QHS    hydroxypropyl methylcellulose (ISOPTO TEARS) 0.5 % ophthalmic solution 1 Drop  1 Drop Both Eyes PRN    dilTIAZem (CARDIZEM) 125 mg in dextrose 5% 125 mL infusion  0-15 mg/hr IntraVENous TITRATE       AUDIE Conde MD     [facility] [address] [place] [address] [address], [address]   Lauren Stevens   [phone]

## 2022-05-20 NOTE — PROGRESS NOTES
Problem: Mobility Impaired (Adult and Pediatric)  Goal: *Acute Goals and Plan of Care (Insert Text)  Description: FUNCTIONAL STATUS PRIOR TO ADMISSION: Patient ambulated with a rolling walker. HOME SUPPORT PRIOR TO ADMISSION: The patient lived in a LTC facility. Physical Therapy Goals  Initiated 5/18/2022  1. Patient will move from supine to sit and sit to supine , scoot up and down, and roll side to side in bed with supervision/set-up within 7 day(s). 2.  Patient will transfer from bed to chair and chair to bed with supervision/set-up using the least restrictive device within 7 day(s). 3.  Patient will perform sit to stand with supervision/set-up within 7 day(s). 4.  Patient will ambulate with supervision/set-up for 150 feet with the least restrictive device within 7 day(s). Outcome: Progressing Towards Goal     PHYSICAL THERAPY TREATMENT  Patient: Isaac Atkins (89 y.o. male)  Date: 5/20/2022  Diagnosis: SOB (shortness of breath) [R06.02]  Acute respiratory failure with hypoxia and hypercarbia (HCC) [J96.01, J96.02] <principal problem not specified>      Precautions: Fall,Skin  Chart, physical therapy assessment, plan of care and goals were reviewed. ASSESSMENT  Patient continues with skilled PT services and is progressing towards goals. Pt generally mobilized at a Mod Ax2 to Min A level during today's session. Pt received supine in bed, in soft restraints, family present, on [facility] (however NC partially dislodged). Conversation had with RN who was agreeable to d/c restraints to facilitate pt OOB to bedside chair. Pt verbalized understanding to no touch PICC line. Pt discovered with damp roland so after transferring supine>sit with assist for LE and trunk advancement, pt sat EOB ~ 5 minutes while gown changed. Pt able to sit comfortable without LOB. Pt then transferred bed>chair with RW.  Pt displayed difficulty with sequencing and required PT to manage walker over course of trip and assist with placement of UE on chair arm rests prior to going stand>sit. PT the positioned for comfort and left up with lunch tray. Of note, pt displayed increased difficulty going sit>stand from bed however suspect it was due to poor UE support from soft bedding and pt will complete transfer with less assist from bedside chair. Current Level of Function Impacting Discharge (mobility/balance): Mod Ax2 sit>stand Min A for OOB mobility    Other factors to consider for discharge: admitted from [address] [place] [address], unclear level of caryover         PLAN :  Patient continues to benefit from skilled intervention to address the above impairments. Continue treatment per established plan of care. to address goals. Recommendation for discharge: (in order for the patient to meet his/her long term goals)  To be determined: LTC vs SNF    This discharge recommendation:  Has been made in collaboration with the attending provider and/or case management    IF patient discharges home will need the following DME: to be determined (TBD)       SUBJECTIVE:   Patient stated i'd like to go to the chair.     OBJECTIVE DATA SUMMARY:   Critical Behavior:  Neurologic State: Alert  Orientation Level: Oriented to person,Disoriented to person,Disoriented to place,Disoriented to situation  Cognition: Follows commands  Safety/Judgement: Awareness of environment,Decreased insight into deficits,Fall prevention  Functional Mobility Training:  Bed Mobility:     Supine to Sit: Moderate assistance  Sit to Supine: Other (comment) (not observed, left up in chair)  Scooting: Minimum assistance        Transfers:  Sit to Stand: Moderate assistance;Assist x2; Additional time; Adaptive equipment  Stand to Sit: Minimum assistance        Bed to Chair: Minimum assistance;Assist x1;Adaptive equipment; Additional time                    Balance:  Sitting: Intact  Standing: Impaired; With support  Standing - Static: Fair;Constant support  Standing - Dynamic : Fair;Constant support    Pain Rating:  Pt did not verbalize pain during session    Activity Tolerance:   Good and tolerates ADLs without rest breaks    After treatment patient left in no apparent distress:   Sitting in chair, Heels elevated for pressure relief, Call bell within reach, Bed / chair alarm activated and Caregiver / family present    COMMUNICATION/COLLABORATION:   The patients plan of care was discussed with: Registered nurse and Rehabilitation technician.      Baldomero Sierra PT   Time Calculation: 27 mins

## 2022-05-20 NOTE — PROGRESS NOTES
[address] [facility] Adult  Hospitalist Group                                                                                          Hospitalist Progress Note  Allie Alcantara MD  Answering service: [phone] from in house phone        Date of Service:  2022  NAME:  Kory Giles  :  1931  MRN:  [id number]      Admission Summary:       Kory Giles is a 80 y.o. male with a pmhx hypertension, CKD, DM 2, dysphagia, falls, and dementia who presented via EMS for hypoxia, and tachycardia. Per chart record, heart rate was in the 150s, and he was hypoxic to 83%. Obtained from chart review due to patient with tachypnea, and inability to complete full sentences during my evaluation. He was previously hospitalized from 3/14/2022 to 3/18/2022 for LAMINE with hyperkalemia     In the ED, he was tachycardic to the 130s, with recorded SPO2 97 to 100% on BiPAP. Labs are significant for troponin 109, probnp 31,490,  PH 7.29, PCO2 58.5, and PO2 69. Echocardiogram was done, and showed depressed EF 30 to 50%. Chest x-ray significant for moderate pulmonary edema with bilateral pleural effusions. EKG shows atrial fibrillation with  with non-specific ST-T wave changes.     In the ED, he received duo nebs, 2 mg IV Bumex, 125 IV Solu-Medrol, and was placed on BiPAP. Between the time of my chart review, and evaluating the patient he had taken his BiPAP off due to not tolerating the mask. Nursing staff tried a nasal mask without success. Stat ABG ordered, and PCO2 was worsening so had a discussion with the patient, provided reassurance, treated anxiety with Seroquel, and reattempted Pap therapy. He did tolerate at this time, and ABG is improving, so will admit to [facility] at this time.     Seen by Intensivist and recommended non ICU bed    Interval history / Subjective:     Patient poor historian, he said he feels the same, no left side chest pain, palpitation or diaphoresis      Assessment & Plan:     Acute hypoxic, hypercapnic respiratory failure due to pulmonary edema, pleural effusions   -elevated probnp  -has been on BiPAP for 5 hrs last night, has weaned off, on 2 l/m, SpO2  % monitor pulse ox to wean off oxygen   -s/p steroids   -continue diuresis with bumex, strict I&O, and daily weight  -chest x ray on 5/17 moderate pulmonary edema with bilateral pleural effusions  -echo pending  -pulmonologist on board    Elevated troponin   -trend troponin, coreg and imdur   -on aspirin  -no left side chest pain  -cardiologist on board      Atrial fibrillation  -rate controlled on diltiazem gtt, and pt maintaining good MAP at this time  -discussion regarding risk benefit of anticoagulation due to hx frequent falls, and dementia  -echocardiogram pending  -CT head no acute process      CKD stage IIIa  -creatinine stable  -avoid nephrotoxin   -monitor renal function      Chornic dementia  -Patient with chronic dementia,   -conscious and alert, oriented to place, person and time, answer questions  -continue neuro check and supportive care    Anemia of chronic disease   -Hgb 11.5  -stable    DNR: high risk for decompensation,  palliative care team          Code status: DNR  Prophylaxis: Lovenox  Care Plan discussed with: Patient and Nurse   Anticipated Disposition: SNF      Hospital Problems  Never Reviewed          Codes Class Noted POA    Acute respiratory failure with hypoxia and hypercarbia ([facility] [address] ICD-10-CM: J96.01, J96.02  ICD-9-CM: 518.81  5/18/2022 Unknown        SOB (shortness of breath) ICD-10-CM: R06.02  ICD-9-CM: 786.05  5/17/2022 Unknown                 Vital Signs:    Last 24hrs VS reviewed since prior progress note.  Most recent are:  Visit Vitals  BP (!) 106/53 (BP 1 Location: Right arm, BP Patient Position: At rest;Lying;Semi fowlers)   Pulse 77   Temp 97.6 °F (36.4 °C)   Resp 22   Ht 5' 2\" (1.575 m)   Wt 85.2 kg (187 lb 12.8 oz)   SpO2 100%   BMI 34.35 kg/m²       No intake or output data in the 24 hours ending 05/20/22 1631     Physical Examination:     I had a face to face encounter with this patient and independently examined them on 5/20/2022 as outlined below:          Constitutional:  No acute distress, cooperative, pleasant    ENT:  Oral mucosa moist, oropharynx benign. Resp:  Decrease bronchial breath sound bilaterally. No wheezing/rhonchi/rales. No accessory muscle use. CV:  Regular rhythm, normal rate, no murmurs, gallops, rubs    GI:  Soft, non distended, non tender. normoactive bowel sounds, no hepatosplenomegaly     Musculoskeletal:  No edema,     Neurologic:  Moves all extremities, conscious and alert, answer questions, moves extremities             Data Review:    Review and/or order of clinical lab test  Review and/or order of tests in the radiology section of CPT  Review and/or order of tests in the medicine section of CPT      Labs:     Recent Labs     05/20/22 0024 05/19/22 0520   WBC 5.3 5.2   HGB 11.5* 10.9*   HCT 38.6 37.0    207     Recent Labs     05/20/22 0024 05/19/22 0520 05/18/22  0618    144 142   K 4.4 4.0 4.4    109* 107   CO2 30 29 31   BUN 40* 41* 37*   CREA 1.56* 1.51* 1.61*   * 136* 159*   CA 9.3 9.6 9.5   MG 2.2  --   --    PHOS 3.2  --   --      Recent Labs     05/20/22 0024   ALT 30   AP 66   TBILI 0.5   TP 7.5   ALB 3.0*   GLOB 4.5*     No results for input(s): INR, PTP, APTT, INREXT, INREXT in the last 72 hours. No results for input(s): FE, TIBC, PSAT, FERR in the last 72 hours. No results found for: FOL, RBCF   Recent Labs     05/18/22 2013   PH 7.36   PCO2 50*   PO2 86     No results for input(s): CPK, CKNDX, TROIQ in the last 72 hours.     No lab exists for component: CPKMB  Lab Results   Component Value Date/Time    Cholesterol, total 129 01/05/2022 04:48 AM    HDL Cholesterol 35 01/05/2022 04:48 AM    LDL, calculated 67.8 01/05/2022 04:48 AM    Triglyceride 131 01/05/2022 04:48 AM    CHOL/HDL Ratio 3.7 01/05/2022 04:48 AM     Lab Results   Component Value Date/Time    Glucose (POC) 94 01/06/2022 05:45 AM    Glucose (POC) 84 01/06/2022 01:31 AM    Glucose (POC) 111 01/05/2022 04:39 PM    Glucose (POC) 128 (H) 01/05/2022 11:47 AM    Glucose (POC) 90 01/05/2022 07:24 AM     No results found for: COLOR, APPRN, SPGRU, REFSG, JOSE G, PROTU, GLUCU, KETU, BILU, UROU, STEPHANIE, LEUKU, GLUKE, EPSU, BACTU, WBCU, RBCU, CASTS, UCRY      Medications Reviewed:     Current Facility-Administered Medications   Medication Dose Route Frequency    carvediloL (COREG) tablet 6.25 mg  6.25 mg Oral BID WITH MEALS    artificial saliva (MOUTH KOTE) 1 Spray  1 Spray Oral PRN    sodium chloride (NS) flush 5-40 mL  5-40 mL IntraVENous Q8H    sodium chloride (NS) flush 5-40 mL  5-40 mL IntraVENous PRN    acetaminophen (TYLENOL) tablet 650 mg  650 mg Oral Q6H PRN    Or    acetaminophen (TYLENOL) suppository 650 mg  650 mg Rectal Q6H PRN    ondansetron (ZOFRAN ODT) tablet 4 mg  4 mg Oral Q8H PRN    Or    ondansetron (ZOFRAN) injection 4 mg  4 mg IntraVENous Q6H PRN    enoxaparin (LOVENOX) injection 30 mg  30 mg SubCUTAneous DAILY    bumetanide (BUMEX) injection 1 mg  1 mg IntraVENous Q12H    albuterol-ipratropium (DUO-NEB) 2.5 MG-0.5 MG/3 ML  3 mL Nebulization Q4H PRN    aspirin chewable tablet 81 mg  81 mg Oral DAILY    isosorbide mononitrate ER (IMDUR) tablet 30 mg  30 mg Oral 7am    mirtazapine (REMERON) tablet 7.5 mg  7.5 mg Oral QHS    hydroxypropyl methylcellulose (ISOPTO TEARS) 0.5 % ophthalmic solution 1 Drop  1 Drop Both Eyes PRN    dilTIAZem (CARDIZEM) 125 mg in dextrose 5% 125 mL infusion  0-15 mg/hr IntraVENous TITRATE     ______________________________________________________________________  EXPECTED LENGTH OF STAY: 3d 19h  ACTUAL LENGTH OF STAY:          3                 Idania Wang MD

## 2022-05-21 ENCOUNTER — APPOINTMENT (OUTPATIENT)
Dept: NON INVASIVE DIAGNOSTICS | Age: 87
DRG: 291 | End: 2022-05-21
Attending: SPECIALIST
Payer: MEDICARE

## 2022-05-21 LAB
ECHO AV AREA PEAK VELOCITY: 1.7 CM2
ECHO AV AREA VTI: 1.6 CM2
ECHO AV AREA/BSA PEAK VELOCITY: 0.9 CM2/M2
ECHO AV AREA/BSA VTI: 0.9 CM2/M2
ECHO AV MEAN GRADIENT: 9 MMHG
ECHO AV MEAN VELOCITY: 1.5 M/S
ECHO AV PEAK GRADIENT: 16 MMHG
ECHO AV PEAK VELOCITY: 2 M/S
ECHO AV VELOCITY RATIO: 0.4
ECHO AV VTI: 41.6 CM
ECHO EST RA PRESSURE: 15 MMHG
ECHO LA DIAMETER INDEX: 1.77 CM/M2
ECHO LA DIAMETER: 3.3 CM
ECHO LA VOL 2C: 139 ML (ref 18–58)
ECHO LA VOL 4C: 127 ML (ref 18–58)
ECHO LA VOLUME AREA LENGTH: 148 ML
ECHO LA VOLUME INDEX A2C: 75 ML/M2 (ref 16–34)
ECHO LA VOLUME INDEX A4C: 68 ML/M2 (ref 16–34)
ECHO LA VOLUME INDEX AREA LENGTH: 80 ML/M2 (ref 16–34)
ECHO LV E' LATERAL VELOCITY: 6 CM/S
ECHO LV E' SEPTAL VELOCITY: 3 CM/S
ECHO LV FRACTIONAL SHORTENING: 18 % (ref 28–44)
ECHO LV INTERNAL DIMENSION DIASTOLE INDEX: 3.23 CM/M2
ECHO LV INTERNAL DIMENSION DIASTOLIC: 6 CM (ref 4.2–5.9)
ECHO LV INTERNAL DIMENSION SYSTOLIC INDEX: 2.63 CM/M2
ECHO LV INTERNAL DIMENSION SYSTOLIC: 4.9 CM
ECHO LV IVSD: 0.9 CM (ref 0.6–1)
ECHO LV MASS 2D: 215.7 G (ref 88–224)
ECHO LV MASS INDEX 2D: 116 G/M2 (ref 49–115)
ECHO LV POSTERIOR WALL DIASTOLIC: 0.9 CM (ref 0.6–1)
ECHO LV RELATIVE WALL THICKNESS RATIO: 0.3
ECHO LVOT AREA: 4.2 CM2
ECHO LVOT AV VTI INDEX: 0.38
ECHO LVOT DIAM: 2.3 CM
ECHO LVOT MEAN GRADIENT: 1 MMHG
ECHO LVOT PEAK GRADIENT: 3 MMHG
ECHO LVOT PEAK VELOCITY: 0.8 M/S
ECHO LVOT STROKE VOLUME INDEX: 35.5 ML/M2
ECHO LVOT SV: 66 ML
ECHO LVOT VTI: 15.9 CM
ECHO MV A VELOCITY: 0.45 M/S
ECHO MV AREA PHT: 4.2 CM2
ECHO MV E DECELERATION TIME (DT): 180.4 MS
ECHO MV E VELOCITY: 0.93 M/S
ECHO MV E/A RATIO: 2.07
ECHO MV E/E' LATERAL: 15.5
ECHO MV E/E' RATIO (AVERAGED): 23.25
ECHO MV E/E' SEPTAL: 31
ECHO MV PRESSURE HALF TIME (PHT): 52.3 MS
ECHO PV MAX VELOCITY: 0.9 M/S
ECHO PV PEAK GRADIENT: 3 MMHG
ECHO RIGHT VENTRICULAR SYSTOLIC PRESSURE (RVSP): 62 MMHG
ECHO RV TAPSE: 1.4 CM (ref 1.7–?)
ECHO TV REGURGITANT MAX VELOCITY: 3.42 M/S
ECHO TV REGURGITANT PEAK GRADIENT: 47 MMHG

## 2022-05-21 PROCEDURE — 93306 TTE W/DOPPLER COMPLETE: CPT

## 2022-05-21 PROCEDURE — 93306 TTE W/DOPPLER COMPLETE: CPT | Performed by: SPECIALIST

## 2022-05-21 PROCEDURE — 99233 SBSQ HOSP IP/OBS HIGH 50: CPT | Performed by: SPECIALIST

## 2022-05-21 PROCEDURE — 74011250637 HC RX REV CODE- 250/637: Performed by: STUDENT IN AN ORGANIZED HEALTH CARE EDUCATION/TRAINING PROGRAM

## 2022-05-21 PROCEDURE — 74011250637 HC RX REV CODE- 250/637: Performed by: FAMILY MEDICINE

## 2022-05-21 PROCEDURE — 74011250637 HC RX REV CODE- 250/637: Performed by: NURSE PRACTITIONER

## 2022-05-21 PROCEDURE — 65270000029 HC RM PRIVATE

## 2022-05-21 PROCEDURE — 74011000250 HC RX REV CODE- 250: Performed by: FAMILY MEDICINE

## 2022-05-21 PROCEDURE — 74011250636 HC RX REV CODE- 250/636: Performed by: FAMILY MEDICINE

## 2022-05-21 RX ADMIN — BUMETANIDE 1 MG: 0.25 INJECTION INTRAMUSCULAR; INTRAVENOUS at 10:25

## 2022-05-21 RX ADMIN — ISOSORBIDE MONONITRATE 30 MG: 30 TABLET, EXTENDED RELEASE ORAL at 06:44

## 2022-05-21 RX ADMIN — MIRTAZAPINE 7.5 MG: 15 TABLET, FILM COATED ORAL at 23:34

## 2022-05-21 RX ADMIN — ACETAMINOPHEN 650 MG: 325 TABLET ORAL at 16:09

## 2022-05-21 RX ADMIN — CARVEDILOL 6.25 MG: 6.25 TABLET, FILM COATED ORAL at 16:09

## 2022-05-21 RX ADMIN — ASPIRIN 81 MG CHEWABLE TABLET 81 MG: 81 TABLET CHEWABLE at 10:26

## 2022-05-21 RX ADMIN — BUMETANIDE 1 MG: 0.25 INJECTION INTRAMUSCULAR; INTRAVENOUS at 23:33

## 2022-05-21 RX ADMIN — CARVEDILOL 6.25 MG: 6.25 TABLET, FILM COATED ORAL at 10:27

## 2022-05-21 RX ADMIN — ENOXAPARIN SODIUM 30 MG: 100 INJECTION SUBCUTANEOUS at 10:25

## 2022-05-21 RX ADMIN — SODIUM CHLORIDE, PRESERVATIVE FREE 10 ML: 5 INJECTION INTRAVENOUS at 06:47

## 2022-05-21 RX ADMIN — SODIUM CHLORIDE, PRESERVATIVE FREE 10 ML: 5 INJECTION INTRAVENOUS at 23:35

## 2022-05-21 NOTE — PROGRESS NOTES
Bedside and Verbal shift change report given to April OBED (oncoming nurse) by Julian Dang (offgoing nurse). Report included the following information SBAR, Kardex, ED Summary, Procedure Summary, Intake/Output, MAR, Recent Results and Cardiac Rhythm NSR/SA/Afib.

## 2022-05-21 NOTE — PROGRESS NOTES
Cardiology Progress Note     Rene Middleton MD LOS 4   Admit Date: 5/17/2022  Admit Diagnosis: SOB (shortness of breath) [R06.02]  Acute respiratory failure with hypoxia and hypercarbia (HCC) [J96.01, J96.02]  Date: 5/21/2022        Subj- says his shortness of breath on exertion and breathing have substantially improved since admit  Denies chest pain,  syncope or shortness of breath at rest   Has no tachycardia , palpitations or sense of arrythmia    Edema is moderate L >R        Prior HPI  Mamie Putnam is a 80 y.o. male   Hx of HTN, CKD, DM, dementia, and dysphagia. He presented to hospital with hypoxia and tachycardia. O2 sats 83%, HR in 150s per EMS. In ED EKG reviewed and was AFlutter with HR 140s. ProBNP 69783, today at <35,000. CXR showed mod pulm edema and bilateral effusions. He was given IV Bumex 2mg and Solumedrol. Initially on BiPAP. Placed on Diltiazem gtt for afib/afl. He has converted to SR   point-of-care echocardiogram revealing ejection fraction between 30 and 50%. AAA 5.6 at last evaluation. Prior ECHO ADULT COMPLETE 01/05/2022 1/5/2022    Left Ventricle: Left ventricle size is normal. Normal wall thickness. Normal wall motion. Low normal left ventricular systolic function with a visually estimated EF of 50 - 55%. Normal diastolic function.   Aortic Valve: Mild sclerosis of the aortic valve cusps. Mild stenosis.   Mitral Valve: Mild transvalvular regurgitation.   Left Atrium: Left atrium is moderately dilated    Assessment/Plan/Discussion:   1. Atrial flutter with RVR:   initial presentation with -150s. On Diltiazem gtt converted converted to SR. Coreg 6.25mg bid. No OAC at this time Dr Aleyda Vick feels poor candidate with risk for bleeding at age 80, poor historian, bleed risk- I agree.      2.  Hypercapnic respiratory failure/elevated BNP:   Improved suspect systolic HF, needs echo done   BNP 30,940. cont on Bumex 1mg IV q12hrs. Echo is ordered  With lower EF per POC echo in ED add Coreg, cont Imdur for now. Hold off on ACE d/t renal function.      3. HTN: stable, 120s/80s to 140s/70s      4. AAA: get vascular evaluation  5.6 cm    5. CKD 3-4  Lab Results   Component Value Date/Time    Creatinine 1.56 (H) 05/20/2022 12:24 AM            Past Medical History:   Diagnosis Date    Dementia ([facility] [address]       Social History     Tobacco Use    Smoking status: Not on file    Smokeless tobacco: Not on file   Substance Use Topics    Alcohol use: Not on file    Drug use: Not on file             Objective:     Physical Exam:                Visit Vitals  /82   Pulse 70   Temp 98 °F (36.7 °C)   Resp 22   Ht 5' 2\" (1.575 m)   Wt 187 lb 12.8 oz (85.2 kg)   SpO2 98%   BMI 34.35 kg/m²        General Appearance:   Well developed, alert, and   individual in no acute distress. Ears/Nose/Mouth/Throat:    Hearing grossly normal.        Neck:  Supple. EJ in place   Chest:    Lungs clear to auscultation bilaterally. Cardiovascular:   irregularly irregular rate and rhythm, S1, S2 normal, no murmur. Abdomen:    Soft, non-tender, bowel sounds are active. Extremities:  left leg with 1+ edema in calf, trace RLE   Skin:  Warm and dry. Scarring on left calf         Data Review:    Labs:    No results found for this or any previous visit (from the past 24 hour(s)).        Radiology:          Current Facility-Administered Medications:     carvediloL (COREG) tablet 6.25 mg, 6.25 mg, Oral, BID WITH MEALS, Hawkins Stamp D, NP, 6.25 mg at 05/21/22 1027    artificial saliva (MOUTH KOTE) 1 Spray, 1 Spray, Oral, PRN, Genoveva Caro MD    sodium chloride (NS) flush 5-40 mL, 5-40 mL, IntraVENous, Q8H, Britney Morris MD, 10 mL at 05/21/22 0647    sodium chloride (NS) flush 5-40 mL, 5-40 mL, IntraVENous, PRN, Britney Morris MD    acetaminophen (TYLENOL) tablet 650 mg, 650 mg, Oral, Q6H PRN, 650 mg at 05/20/22 7512 **OR** acetaminophen (TYLENOL) suppository 650 mg, 650 mg, Rectal, Q6H PRN, Sudeep Cabral MD    ondansetron (ZOFRAN ODT) tablet 4 mg, 4 mg, Oral, Q8H PRN **OR** ondansetron (ZOFRAN) injection 4 mg, 4 mg, IntraVENous, Q6H PRN, Sudeep Cabral MD    enoxaparin (LOVENOX) injection 30 mg, 30 mg, SubCUTAneous, DAILY, Sudeep Cabral MD, 30 mg at 05/21/22 1025    bumetanide (BUMEX) injection 1 mg, 1 mg, IntraVENous, Q12H, Sudeep Cabral MD, 1 mg at 05/21/22 1025    albuterol-ipratropium (DUO-NEB) 2.5 MG-0.5 MG/3 ML, 3 mL, Nebulization, Q4H PRN, Zaid Becerra MD    aspirin chewable tablet 81 mg, 81 mg, Oral, DAILY, Zaid eBcerra MD, 81 mg at 05/21/22 1026    isosorbide mononitrate ER (IMDUR) tablet 30 mg, 30 mg, Oral, 7am, Zaid Becerra MD, 30 mg at 05/21/22 8202    mirtazapine (REMERON) tablet 7.5 mg, 7.5 mg, Oral, QHS, Zaid Becerra MD, 7.5 mg at 05/20/22 5245    hydroxypropyl methylcellulose (ISOPTO TEARS) 0.5 % ophthalmic solution 1 Drop, 1 Drop, Both Eyes, PRN, Zaid Becerra MD Santina Abbot, MD     [facility] [address] [place] [address], [address] [place] [address] [address]   Aiyana Stevensmongozi   [phone]

## 2022-05-21 NOTE — PROGRESS NOTES
3109: Attempted 2x sticks with assistance of another RN to get labwork for this morning from the patient. Sticks unsuccessful. 5076: Another RN attempted 2x sticks for morning labwork from the patient, unsuccessful.

## 2022-05-21 NOTE — PROGRESS NOTES
TRANSFER - IN REPORT:    Verbal report received from April(name) on Alejandra Bounds  being received from 4W(unit) for routine progression of care      Report consisted of patients Situation, Background, Assessment and   Recommendations(SBAR). Information from the following report(s) Kardex was reviewed with the receiving nurse. Opportunity for questions and clarification was provided. Assessment completed upon patients arrival to unit and care assumed.

## 2022-05-21 NOTE — PROGRESS NOTES
[address] [facility] Adult  Hospitalist Group                                                                                          Hospitalist Progress Note  Eliza Auguste MD  Answering service: [phone] from in house phone        Date of Service:  2022  NAME:  Radha Carson  :  1931  MRN:  [id number]      Admission Summary:       Radha Carson is a 80 y.o. male with a pmhx hypertension, CKD, DM 2, dysphagia, falls, and dementia who presented via EMS for hypoxia, and tachycardia. Per chart record, heart rate was in the 150s, and he was hypoxic to 83%. Obtained from chart review due to patient with tachypnea, and inability to complete full sentences during my evaluation. He was previously hospitalized from 3/14/2022 to 3/18/2022 for LAMINE with hyperkalemia     In the ED, he was tachycardic to the 130s, with recorded SPO2 97 to 100% on BiPAP. Labs are significant for troponin 109, probnp 31,490,  PH 7.29, PCO2 58.5, and PO2 69. Echocardiogram was done, and showed depressed EF 30 to 50%. Chest x-ray significant for moderate pulmonary edema with bilateral pleural effusions. EKG shows atrial fibrillation with  with non-specific ST-T wave changes.     In the ED, he received duo nebs, 2 mg IV Bumex, 125 IV Solu-Medrol, and was placed on BiPAP. Between the time of my chart review, and evaluating the patient he had taken his BiPAP off due to not tolerating the mask. Nursing staff tried a nasal mask without success. Stat ABG ordered, and PCO2 was worsening so had a discussion with the patient, provided reassurance, treated anxiety with Seroquel, and reattempted Pap therapy. He did tolerate at this time, and ABG is improving, so will admit to [facility] at this time.     Seen by Intensivist and recommended non ICU bed    Interval history / Subjective:     Patient poor historian, he said he feels the same, no left side chest pain, palpitation or diaphoresis      Assessment & Plan:     Acute hypoxic, hypercapnic respiratory failure due to pulmonary edema, pleural effusions   -elevated probnp  -BiPAP weaned off, on 2 l/m, SpO2  % monitor pulse ox to wean off oxygen   -s/p steroids   -continue diuresis with bumex, strict I&O, and daily weight  -chest x ray on 5/17 moderate pulmonary edema with bilateral pleural effusions  -echo pending  -pulmonologist on board    Elevated troponin   -trend troponin, coreg and imdur   -on aspirin  -no left side chest pain  -cardiologist on board      Atrial fibrillation  -rate controlled on diltiazem gtt, off cardizem gtt    -discussion regarding risk benefit of anticoagulation due to hx frequent falls, and dementia  -continue coreg and aspirin  -echocardiogram pending  -CT head no acute process      CKD stage IIIa  -creatinine stable  -avoid nephrotoxin   -monitor renal function      Chornic dementia  -Patient with chronic dementia,   -conscious and alert, oriented to place, person and time, answer questions  -continue neuro check and supportive care    Anemia of chronic disease   -Hgb 11.5  -stable    DNR: high risk for decompensation,  palliative care team consulted          Code status: DNR  Prophylaxis: Lovenox  Care Plan discussed with: Patient and Nurse   Anticipated Disposition: SNF      Hospital Problems  Never Reviewed          Codes Class Noted POA    Acute respiratory failure with hypoxia and hypercarbia ([facility] [address] ICD-10-CM: J96.01, J96.02  ICD-9-CM: 518.81  5/18/2022 Unknown        SOB (shortness of breath) ICD-10-CM: R06.02  ICD-9-CM: 786.05  5/17/2022 Unknown                 Vital Signs:    Last 24hrs VS reviewed since prior progress note.  Most recent are:  Visit Vitals  BP (!) 147/95   Pulse 73   Temp 97.5 °F (36.4 °C)   Resp 19   Ht 5' 2\" (1.575 m)   Wt 85.2 kg (187 lb 12.8 oz)   SpO2 100%   BMI 34.35 kg/m²       No intake or output data in the 24 hours ending 05/21/22 0936     Physical Examination:     I had a face to face encounter with this patient and independently examined them on 5/21/2022 as outlined below:          Constitutional:  No acute distress, cooperative, pleasant    ENT:  Oral mucosa moist, oropharynx benign. Resp:  Decrease bronchial breath sound bilaterally. No wheezing/rhonchi/rales. No accessory muscle use. CV:  Regular rhythm, normal rate, no murmurs, gallops, rubs    GI:  Soft, non distended, non tender. normoactive bowel sounds, no hepatosplenomegaly     Musculoskeletal:  No edema,     Neurologic:  Moves all extremities, conscious and alert, answer questions, moves extremities             Data Review:    Review and/or order of clinical lab test  Review and/or order of tests in the radiology section of CPT  Review and/or order of tests in the medicine section of CPT      Labs:     Recent Labs     05/20/22 0024 05/19/22 0520   WBC 5.3 5.2   HGB 11.5* 10.9*   HCT 38.6 37.0    207     Recent Labs     05/20/22 0024 05/19/22 0520    144   K 4.4 4.0    109*   CO2 30 29   BUN 40* 41*   CREA 1.56* 1.51*   * 136*   CA 9.3 9.6   MG 2.2  --    PHOS 3.2  --      Recent Labs     05/20/22 0024   ALT 30   AP 66   TBILI 0.5   TP 7.5   ALB 3.0*   GLOB 4.5*     No results for input(s): INR, PTP, APTT, INREXT, INREXT in the last 72 hours. No results for input(s): FE, TIBC, PSAT, FERR in the last 72 hours. No results found for: FOL, RBCF   Recent Labs     05/18/22 2013   PH 7.36   PCO2 50*   PO2 86     No results for input(s): CPK, CKNDX, TROIQ in the last 72 hours.     No lab exists for component: CPKMB  Lab Results   Component Value Date/Time    Cholesterol, total 129 01/05/2022 04:48 AM    HDL Cholesterol 35 01/05/2022 04:48 AM    LDL, calculated 67.8 01/05/2022 04:48 AM    Triglyceride 131 01/05/2022 04:48 AM    CHOL/HDL Ratio 3.7 01/05/2022 04:48 AM     Lab Results   Component Value Date/Time    Glucose (POC) 94 01/06/2022 05:45 AM    Glucose (POC) 84 01/06/2022 01:31 AM    Glucose (POC) 111 01/05/2022 04:39 PM    Glucose (POC) 128 (H) 01/05/2022 11:47 AM    Glucose (POC) 90 01/05/2022 07:24 AM     No results found for: COLOR, APPRN, SPGRU, REFSG, JOSE G, PROTU, GLUCU, KETU, BILU, UROU, STEPHANIE, LEUKU, GLUKE, EPSU, BACTU, WBCU, RBCU, CASTS, UCRY      Medications Reviewed:     Current Facility-Administered Medications   Medication Dose Route Frequency    carvediloL (COREG) tablet 6.25 mg  6.25 mg Oral BID WITH MEALS    artificial saliva (MOUTH KOTE) 1 Spray  1 Spray Oral PRN    sodium chloride (NS) flush 5-40 mL  5-40 mL IntraVENous Q8H    sodium chloride (NS) flush 5-40 mL  5-40 mL IntraVENous PRN    acetaminophen (TYLENOL) tablet 650 mg  650 mg Oral Q6H PRN    Or    acetaminophen (TYLENOL) suppository 650 mg  650 mg Rectal Q6H PRN    ondansetron (ZOFRAN ODT) tablet 4 mg  4 mg Oral Q8H PRN    Or    ondansetron (ZOFRAN) injection 4 mg  4 mg IntraVENous Q6H PRN    enoxaparin (LOVENOX) injection 30 mg  30 mg SubCUTAneous DAILY    bumetanide (BUMEX) injection 1 mg  1 mg IntraVENous Q12H    albuterol-ipratropium (DUO-NEB) 2.5 MG-0.5 MG/3 ML  3 mL Nebulization Q4H PRN    aspirin chewable tablet 81 mg  81 mg Oral DAILY    isosorbide mononitrate ER (IMDUR) tablet 30 mg  30 mg Oral 7am    mirtazapine (REMERON) tablet 7.5 mg  7.5 mg Oral QHS    hydroxypropyl methylcellulose (ISOPTO TEARS) 0.5 % ophthalmic solution 1 Drop  1 Drop Both Eyes PRN    dilTIAZem (CARDIZEM) 125 mg in dextrose 5% 125 mL infusion  0-15 mg/hr IntraVENous TITRATE     ______________________________________________________________________  EXPECTED LENGTH OF STAY: 3d 19h  ACTUAL LENGTH OF STAY:          4                 Maribeth MD Cassandra

## 2022-05-22 LAB
ALBUMIN SERPL-MCNC: 2.6 G/DL (ref 3.5–5)
ALBUMIN/GLOB SERPL: 0.6 {RATIO} (ref 1.1–2.2)
ALP SERPL-CCNC: 65 U/L (ref 45–117)
ALT SERPL-CCNC: 24 U/L (ref 12–78)
ANION GAP SERPL CALC-SCNC: 2 MMOL/L (ref 5–15)
AST SERPL-CCNC: 21 U/L (ref 15–37)
ATRIAL RATE: 63 BPM
BILIRUB SERPL-MCNC: 0.6 MG/DL (ref 0.2–1)
BNP SERPL-MCNC: ABNORMAL PG/ML
BUN SERPL-MCNC: 32 MG/DL (ref 6–20)
BUN/CREAT SERPL: 22 (ref 12–20)
CALCIUM SERPL-MCNC: 8.9 MG/DL (ref 8.5–10.1)
CALCULATED P AXIS, ECG09: 4 DEGREES
CALCULATED R AXIS, ECG10: -38 DEGREES
CALCULATED T AXIS, ECG11: -113 DEGREES
CHLORIDE SERPL-SCNC: 105 MMOL/L (ref 97–108)
CO2 SERPL-SCNC: 34 MMOL/L (ref 21–32)
CREAT SERPL-MCNC: 1.46 MG/DL (ref 0.7–1.3)
DIAGNOSIS, 93000: NORMAL
GLOBULIN SER CALC-MCNC: 4.4 G/DL (ref 2–4)
GLUCOSE SERPL-MCNC: 110 MG/DL (ref 65–100)
LACTATE SERPL-SCNC: 0.7 MMOL/L (ref 0.4–2)
P-R INTERVAL, ECG05: 144 MS
PHOSPHATE SERPL-MCNC: 2.8 MG/DL (ref 2.6–4.7)
POTASSIUM SERPL-SCNC: 4.1 MMOL/L (ref 3.5–5.1)
PROCALCITONIN SERPL-MCNC: <0.05 NG/ML
PROT SERPL-MCNC: 7 G/DL (ref 6.4–8.2)
Q-T INTERVAL, ECG07: 514 MS
QRS DURATION, ECG06: 120 MS
QTC CALCULATION (BEZET), ECG08: 525 MS
SODIUM SERPL-SCNC: 141 MMOL/L (ref 136–145)
VENTRICULAR RATE, ECG03: 63 BPM

## 2022-05-22 PROCEDURE — 80053 COMPREHEN METABOLIC PANEL: CPT

## 2022-05-22 PROCEDURE — 74011250637 HC RX REV CODE- 250/637: Performed by: NURSE PRACTITIONER

## 2022-05-22 PROCEDURE — 36415 COLL VENOUS BLD VENIPUNCTURE: CPT

## 2022-05-22 PROCEDURE — 83880 ASSAY OF NATRIURETIC PEPTIDE: CPT

## 2022-05-22 PROCEDURE — 93005 ELECTROCARDIOGRAM TRACING: CPT

## 2022-05-22 PROCEDURE — 84145 PROCALCITONIN (PCT): CPT

## 2022-05-22 PROCEDURE — 74011000250 HC RX REV CODE- 250: Performed by: FAMILY MEDICINE

## 2022-05-22 PROCEDURE — 74011250637 HC RX REV CODE- 250/637: Performed by: STUDENT IN AN ORGANIZED HEALTH CARE EDUCATION/TRAINING PROGRAM

## 2022-05-22 PROCEDURE — 65270000029 HC RM PRIVATE

## 2022-05-22 PROCEDURE — 74011250636 HC RX REV CODE- 250/636: Performed by: FAMILY MEDICINE

## 2022-05-22 PROCEDURE — 84100 ASSAY OF PHOSPHORUS: CPT

## 2022-05-22 PROCEDURE — 83605 ASSAY OF LACTIC ACID: CPT

## 2022-05-22 RX ADMIN — SODIUM CHLORIDE, PRESERVATIVE FREE 10 ML: 5 INJECTION INTRAVENOUS at 17:46

## 2022-05-22 RX ADMIN — CARVEDILOL 6.25 MG: 6.25 TABLET, FILM COATED ORAL at 08:58

## 2022-05-22 RX ADMIN — BUMETANIDE 1 MG: 0.25 INJECTION INTRAMUSCULAR; INTRAVENOUS at 08:58

## 2022-05-22 RX ADMIN — ENOXAPARIN SODIUM 30 MG: 100 INJECTION SUBCUTANEOUS at 08:58

## 2022-05-22 RX ADMIN — SODIUM CHLORIDE, PRESERVATIVE FREE 10 ML: 5 INJECTION INTRAVENOUS at 23:29

## 2022-05-22 RX ADMIN — ISOSORBIDE MONONITRATE 30 MG: 30 TABLET, EXTENDED RELEASE ORAL at 07:05

## 2022-05-22 RX ADMIN — MIRTAZAPINE 7.5 MG: 15 TABLET, FILM COATED ORAL at 23:29

## 2022-05-22 RX ADMIN — ASPIRIN 81 MG CHEWABLE TABLET 81 MG: 81 TABLET CHEWABLE at 08:58

## 2022-05-22 NOTE — PROGRESS NOTES
Problem: Pressure Injury - Risk of  Goal: *Prevention of pressure injury  Description: Document Jonathon Scale and appropriate interventions in the flowsheet. Outcome: Progressing Towards Goal  Note: Pressure Injury Interventions:  Sensory Interventions: Assess need for specialty bed,Assess changes in LOC    Moisture Interventions: Internal/External urinary devices    Activity Interventions: Pressure redistribution bed/mattress(bed type)    Mobility Interventions: Pressure redistribution bed/mattress (bed type)    Nutrition Interventions: Document food/fluid/supplement intake    Friction and Shear Interventions: Lift sheet,HOB 30 degrees or less                Problem: Falls - Risk of  Goal: *Absence of Falls  Description: Document Jigna Fall Risk and appropriate interventions in the flowsheet. Outcome: Progressing Towards Goal  Note: Fall Risk Interventions:  Mobility Interventions: Communicate number of staff needed for ambulation/transfer    Mentation Interventions: Door open when patient unattended,Room close to nurse's station    Medication Interventions: Evaluate medications/consider consulting pharmacy,Patient to call before getting OOB    Elimination Interventions:  Toileting schedule/hourly rounds

## 2022-05-22 NOTE — PROGRESS NOTES
Physical Therapy  5/22/2022    New order received. Patient on PT caseload for 4x/week and recommendations made for SNF vs LTC at d/c per evaluation on 5/20. Note palliative consulted and patient without plans for d/c today therefore will follow up tomorrow (5/23) for PT per POC. Thank you.     Cheyanne Yeboah, PT, DPT

## 2022-05-22 NOTE — PROGRESS NOTES
Problem: Pressure Injury - Risk of  Goal: *Prevention of pressure injury  Description: Document Jonathon Scale and appropriate interventions in the flowsheet.   Outcome: Progressing Towards Goal  Note: Pressure Injury Interventions:  Sensory Interventions: Assess need for specialty bed,Assess changes in LOC    Moisture Interventions: Absorbent underpads,Check for incontinence Q2 hours and as needed    Activity Interventions: Pressure redistribution bed/mattress(bed type)    Mobility Interventions: Pressure redistribution bed/mattress (bed type)    Nutrition Interventions: Document food/fluid/supplement intake    Friction and Shear Interventions: Lift sheet,HOB 30 degrees or less

## 2022-05-22 NOTE — PROGRESS NOTES
[address] [facility] Adult  Hospitalist Group                                                                                          Hospitalist Progress Note  Chelo Huerta MD  Answering service: [phone] from in house phone        Date of Service:  2022  NAME:  Leticia Guerrero  :  1931  MRN:  [id number]      Admission Summary:       Leticia Guerrero is a 80 y.o. male with a pmhx hypertension, CKD, DM 2, dysphagia, falls, and dementia who presented via EMS for hypoxia, and tachycardia. Per chart record, heart rate was in the 150s, and he was hypoxic to 83%. Obtained from chart review due to patient with tachypnea, and inability to complete full sentences during my evaluation. He was previously hospitalized from 3/14/2022 to 3/18/2022 for LAMINE with hyperkalemia     In the ED, he was tachycardic to the 130s, with recorded SPO2 97 to 100% on BiPAP. Labs are significant for troponin 109, probnp 31,490,  PH 7.29, PCO2 58.5, and PO2 69. Echocardiogram was done, and showed depressed EF 30 to 50%. Chest x-ray significant for moderate pulmonary edema with bilateral pleural effusions. EKG shows atrial fibrillation with  with non-specific ST-T wave changes.     In the ED, he received duo nebs, 2 mg IV Bumex, 125 IV Solu-Medrol, and was placed on BiPAP. Between the time of my chart review, and evaluating the patient he had taken his BiPAP off due to not tolerating the mask. Nursing staff tried a nasal mask without success. Stat ABG ordered, and PCO2 was worsening so had a discussion with the patient, provided reassurance, treated anxiety with Seroquel, and reattempted Pap therapy. He did tolerate at this time, and ABG is improving, so will admit to [facility] at this time.     Seen by Intensivist and recommended non ICU bed    Interval history / Subjective:     Patient poor historian, he said he feels the same, no left side chest pain, palpitation or diaphoresis      Assessment & Plan:     Acute hypoxic, hypercapnic respiratory failure due to pulmonary edema, pleural effusions due to systolic CHF EF 92-15%  -elevated probnp  -BiPAP weaned off, on 2 l/m, SpO2  % monitor pulse ox to wean off oxygen   -s/p steroids   -continue diuresis with bumex, strict I&O, and daily weight  -chest x ray on 5/17 moderate pulmonary edema with bilateral pleural effusions  -echo LV EF 15-20%, there are regional wall motion abnormalities, moderate AS  -pulmonologist on board    Elevated troponin   -troponin 141, initial was 109   -on aspirin, coreg and imdur  -no left side chest pain  -cardiologist on board      Atrial fibrillation, rate controlled  -rate controlled on diltiazem gtt, off cardizem gtt    -discussion regarding risk benefit of anticoagulation due to hx frequent falls, and dementia  -continue coreg and aspirin  -CT head no acute process     AAA   -US of abdomen on 3/14 Aneurysmal dilatation of the abdominal aorta is suggested measuring up to 5.6 cm in size.   -no abdominal pain  -patient with dementia, high risk for intervention    CKD stage IIIa  -creatinine stable  -avoid nephrotoxin   -monitor renal function      Chornic dementia  -Patient with chronic dementia,   -conscious and alert, oriented to place, person and time, answer questions  -continue neuro check and supportive care    Anemia of chronic disease   -Hgb 11.5  -stable    DNR: high risk for decompensation,  palliative care team on board          Code status: DNR  Prophylaxis: Lovenox  Care Plan discussed with: Patient and Nurse   Anticipated Disposition: SNF      Hospital Problems  Never Reviewed          Codes Class Noted POA    Acute respiratory failure with hypoxia and hypercarbia ([facility] [address] ICD-10-CM: J96.01, J96.02  ICD-9-CM: 518.81  5/18/2022 Unknown        SOB (shortness of breath) ICD-10-CM: R06.02  ICD-9-CM: 786.05  5/17/2022 Unknown                 Vital Signs:    Last 24hrs VS reviewed since prior progress note.  Most recent are:  Visit Vitals  BP (!) 153/74   Pulse 72   Temp 98.3 °F (36.8 °C)   Resp 16   Ht 5' 2\" (1.575 m)   Wt 85.2 kg (187 lb 13.3 oz)   SpO2 92%   BMI 34.35 kg/m²         Intake/Output Summary (Last 24 hours) at 5/22/2022 0945  Last data filed at 5/21/2022 1021  Gross per 24 hour   Intake 240 ml   Output    Net 240 ml        Physical Examination:     I had a face to face encounter with this patient and independently examined them on 5/22/2022 as outlined below:          Constitutional:  No acute distress, cooperative, pleasant    ENT:  Oral mucosa moist, oropharynx benign. Resp:  Decrease bronchial breath sound bilaterally. No wheezing/rhonchi/rales. No accessory muscle use. CV:  Regular rhythm, normal rate, no murmurs, gallops, rubs    GI:  Soft, non distended, non tender. normoactive bowel sounds, no hepatosplenomegaly     Musculoskeletal:  No edema,     Neurologic:  Moves all extremities, conscious and alert, answer questions, moves extremities             Data Review:    Review and/or order of clinical lab test  Review and/or order of tests in the radiology section of CPT  Review and/or order of tests in the medicine section of CPT      Labs:     Recent Labs     05/20/22  0024   WBC 5.3   HGB 11.5*   HCT 38.6        Recent Labs     05/22/22  0754 05/20/22  0024    140   K 4.1 4.4    104   CO2 34* 30   BUN 32* 40*   CREA 1.46* 1.56*   * 134*   CA 8.9 9.3   MG  --  2.2   PHOS 2.8 3.2     Recent Labs     05/22/22  0754 05/20/22  0024   ALT 24 30   AP 65 66   TBILI 0.6 0.5   TP 7.0 7.5   ALB 2.6* 3.0*   GLOB 4.4* 4.5*     No results for input(s): INR, PTP, APTT, INREXT, INREXT in the last 72 hours. No results for input(s): FE, TIBC, PSAT, FERR in the last 72 hours. No results found for: FOL, RBCF   No results for input(s): PH, PCO2, PO2 in the last 72 hours. No results for input(s): CPK, CKNDX, TROIQ in the last 72 hours.     No lab exists for component: CPKMB  Lab Results Component Value Date/Time    Cholesterol, total 129 01/05/2022 04:48 AM    HDL Cholesterol 35 01/05/2022 04:48 AM    LDL, calculated 67.8 01/05/2022 04:48 AM    Triglyceride 131 01/05/2022 04:48 AM    CHOL/HDL Ratio 3.7 01/05/2022 04:48 AM     Lab Results   Component Value Date/Time    Glucose (POC) 94 01/06/2022 05:45 AM    Glucose (POC) 84 01/06/2022 01:31 AM    Glucose (POC) 111 01/05/2022 04:39 PM    Glucose (POC) 128 (H) 01/05/2022 11:47 AM    Glucose (POC) 90 01/05/2022 07:24 AM     No results found for: COLOR, APPRN, SPGRU, REFSG, JOSE G, PROTU, GLUCU, KETU, BILU, UROU, STEPHANIE, LEUKU, GLUKE, EPSU, BACTU, WBCU, RBCU, CASTS, UCRY      Medications Reviewed:     Current Facility-Administered Medications   Medication Dose Route Frequency    carvediloL (COREG) tablet 6.25 mg  6.25 mg Oral BID WITH MEALS    artificial saliva (MOUTH KOTE) 1 Spray  1 Spray Oral PRN    sodium chloride (NS) flush 5-40 mL  5-40 mL IntraVENous Q8H    sodium chloride (NS) flush 5-40 mL  5-40 mL IntraVENous PRN    acetaminophen (TYLENOL) tablet 650 mg  650 mg Oral Q6H PRN    Or    acetaminophen (TYLENOL) suppository 650 mg  650 mg Rectal Q6H PRN    ondansetron (ZOFRAN ODT) tablet 4 mg  4 mg Oral Q8H PRN    Or    ondansetron (ZOFRAN) injection 4 mg  4 mg IntraVENous Q6H PRN    enoxaparin (LOVENOX) injection 30 mg  30 mg SubCUTAneous DAILY    bumetanide (BUMEX) injection 1 mg  1 mg IntraVENous Q12H    albuterol-ipratropium (DUO-NEB) 2.5 MG-0.5 MG/3 ML  3 mL Nebulization Q4H PRN    aspirin chewable tablet 81 mg  81 mg Oral DAILY    isosorbide mononitrate ER (IMDUR) tablet 30 mg  30 mg Oral 7am    mirtazapine (REMERON) tablet 7.5 mg  7.5 mg Oral QHS    hydroxypropyl methylcellulose (ISOPTO TEARS) 0.5 % ophthalmic solution 1 Drop  1 Drop Both Eyes PRN     ______________________________________________________________________  EXPECTED LENGTH OF STAY: 3d 19h  ACTUAL LENGTH OF STAY:          5                 Ravi Pires MD

## 2022-05-23 PROBLEM — I50.21 ACUTE SYSTOLIC HEART FAILURE (HCC): Status: ACTIVE | Noted: 2022-05-23

## 2022-05-23 PROCEDURE — 74011250637 HC RX REV CODE- 250/637: Performed by: NURSE PRACTITIONER

## 2022-05-23 PROCEDURE — 74011250637 HC RX REV CODE- 250/637: Performed by: HOSPITALIST

## 2022-05-23 PROCEDURE — 74011250636 HC RX REV CODE- 250/636: Performed by: FAMILY MEDICINE

## 2022-05-23 PROCEDURE — 65270000029 HC RM PRIVATE

## 2022-05-23 PROCEDURE — 99233 SBSQ HOSP IP/OBS HIGH 50: CPT | Performed by: SPECIALIST

## 2022-05-23 PROCEDURE — 94760 N-INVAS EAR/PLS OXIMETRY 1: CPT

## 2022-05-23 PROCEDURE — 74011250637 HC RX REV CODE- 250/637: Performed by: STUDENT IN AN ORGANIZED HEALTH CARE EDUCATION/TRAINING PROGRAM

## 2022-05-23 PROCEDURE — 97116 GAIT TRAINING THERAPY: CPT

## 2022-05-23 PROCEDURE — 74011000250 HC RX REV CODE- 250: Performed by: FAMILY MEDICINE

## 2022-05-23 RX ORDER — MIDODRINE HYDROCHLORIDE 5 MG/1
5 TABLET ORAL
Status: DISCONTINUED | OUTPATIENT
Start: 2022-05-23 | End: 2022-05-24 | Stop reason: HOSPADM

## 2022-05-23 RX ORDER — BUMETANIDE 1 MG/1
1 TABLET ORAL 2 TIMES DAILY
Status: DISCONTINUED | OUTPATIENT
Start: 2022-05-23 | End: 2022-05-24 | Stop reason: HOSPADM

## 2022-05-23 RX ADMIN — SODIUM CHLORIDE, PRESERVATIVE FREE 10 ML: 5 INJECTION INTRAVENOUS at 21:50

## 2022-05-23 RX ADMIN — CARVEDILOL 6.25 MG: 6.25 TABLET, FILM COATED ORAL at 17:50

## 2022-05-23 RX ADMIN — CARVEDILOL 6.25 MG: 6.25 TABLET, FILM COATED ORAL at 09:47

## 2022-05-23 RX ADMIN — ASPIRIN 81 MG CHEWABLE TABLET 81 MG: 81 TABLET CHEWABLE at 09:47

## 2022-05-23 RX ADMIN — BUMETANIDE 1 MG: 1 TABLET ORAL at 17:50

## 2022-05-23 RX ADMIN — MIDODRINE HYDROCHLORIDE 5 MG: 5 TABLET ORAL at 17:50

## 2022-05-23 RX ADMIN — ENOXAPARIN SODIUM 30 MG: 100 INJECTION SUBCUTANEOUS at 09:47

## 2022-05-23 RX ADMIN — MIRTAZAPINE 7.5 MG: 15 TABLET, FILM COATED ORAL at 21:50

## 2022-05-23 NOTE — PROGRESS NOTES
Transition of Care Plan   RUR- 19%    DISPOSITION: The disposition plan is LTC @[facility] [address]  [address] Call Report: [phone]   F/U with PCP/Specialist     Transport: [facility] 5pm     Transition of Care Plan to SNF/Rehab    SNF/Rehab Transition:  Patient has been accepted to [facility] and meets criteria for admission. Patient will transported by [facility] and expected to leave at 5pm.    Communication to Patient/Family:  Met with patient and (identified care giver) and they are agreeable to the transition plan. Communication to SNF/Rehab:  Bedside RN,, has been notified to update the transition plan to the facility and call report (phone number [phone]). Discharge information has been updated on the AVS.       Nursing Please include all hard scripts for controlled substances, med rec and dc summary, and AVS in packet. Reviewed and confirmed with facility,, can manage the patient care needs for the following:     Olga Lidia Felix with (X) only those applicable:    Medication:  [x]  Medications will be available at the facility  []  IV Antibiotics   []  Controlled Substance - hard copy to be sent with patient   []  Weekly Labs   Documents:  [x] Hard RX  [x] MAR  [x] Kardex  [x] AVS  [x]Transfer Summary  [x]Discharge   Equipment:  []  CPAP/BiPAP  []  Wound Vacuum  []  Pham or Urinary Device  []  PICC/Central Line  []  Nebulizer  []  Ventilator   Treatment:  []Isolation (for MRSA, VRE, etc.)  []Surgical Drain Management  []Tracheostomy Care  []Dressing Changes  []Dialysis with transportation and chair time. []PEG Care  [x]Oxygen 3L  []Daily Weights for Heart Failure   Dietary:  []Any diet limitations  []Tube Feedings   []Total Parenteral Management (TPN)   Eligible for Medicaid Long Term Services and Supports  Yes:  [] Eligible for medical assistance or will become eligible within 180 days and UAI completed.    [] Provider/Patient and/or support system has requested screening. [] UAI copy provided to patient or responsible party, .  [] UAI unavailable at discharge will send once processed to SNF provider. [] UAI unavailable at discharged mailed to patient  No:   [x] Private pay and is not financially eligible for Medicaid within the next 180 days. [] Reside out-of-state. [] A residents of a state owned/operated facility that is licensed  by [address] [employer] [facility] or [facility]  [] Enrollment in \[facility]\"" services  [] [address] [place] [address]  [] Patient /Family declines to have screening completed or provide financial information for screening     Financial Resources:  Medicaid    [] Initiated and application pending   [] Full coverage     Advanced Care Plan:  []Surrogate Decision Maker of Care  []POA  [x]Communicated Code Status (DDNR)    Other       Medicare pt has received, reviewed, and signed 2nd IM letter informing them of their right to appeal the discharge. Signed copy has been placed on pt bedside chart.       CM: [address] MSW,   [phone]

## 2022-05-23 NOTE — PROGRESS NOTES
Problem: Pressure Injury - Risk of  Goal: *Prevention of pressure injury  Description: Document Jonathon Scale and appropriate interventions in the flowsheet. Outcome: Progressing Towards Goal  Note: Pressure Injury Interventions:  Sensory Interventions: Assess need for specialty bed,Assess changes in LOC    Moisture Interventions: Absorbent underpads,Check for incontinence Q2 hours and as needed    Activity Interventions: Pressure redistribution bed/mattress(bed type)    Mobility Interventions: Pressure redistribution bed/mattress (bed type)    Nutrition Interventions: Offer support with meals,snacks and hydration    Friction and Shear Interventions: Lift sheet,HOB 30 degrees or less                Problem: Falls - Risk of  Goal: *Absence of Falls  Description: Document Jigna Fall Risk and appropriate interventions in the flowsheet.   Outcome: Progressing Towards Goal  Note: Fall Risk Interventions:  Mobility Interventions: Communicate number of staff needed for ambulation/transfer,Utilize walker, cane, or other assistive device    Mentation Interventions: Adequate sleep, hydration, pain control    Medication Interventions: Evaluate medications/consider consulting pharmacy,Patient to call before getting OOB    Elimination Interventions: Call light in reach,Patient to call for help with toileting needs,Toileting schedule/hourly rounds              Problem: Discharge Planning  Goal: *Discharge to safe environment  Outcome: Progressing Towards Goal

## 2022-05-23 NOTE — PROGRESS NOTES
[facility]  Cardiology Care Note                  []Initial visit     [x]Established visit     Reason for consult: AFL, CHF    SUBJECTIVE:    Patient reports he feels \"good\" today. He thinks he is going home today. Denies CP, SOB/[place], orthopnea, PND, or palpitations. Assessment and Plan     1. Atrial flutter with RVR:   initial presentation with -150s. On Diltiazem gtt converted converted to SR with short occasional bursts of AFL that is rate controlled. Cont on Coreg 6.25mg bid. No OAC at this time, poor candidate with risk for bleeding at age 80, poor historian, bleed risk.     2. Hypercapnic respiratory failure/elevated BNP:   Improved symptomatically. Appears compensated on O2. Echo shows EF 15-20%, mod AS, left pleural effusion. cont on Coreg, currently Bumex and Imdur are being held d/t hypotension, improved this am. Restart at least Bumex as tolerated with low EF and pleural effusion. Hold off on ACE d/t renal function. Recommend transitioning Bumex to po.     3. HTN: see above     4. 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 Clarita Guillermoing  80 y.o. PAFlutter , no OAC, no complaints, elderly, frail at age, poor EF, change to po bumex, add GDOMT as tolerates  Jozef Junior MD       ____________________________________________________________      Review of Systems    [x]11 systems reviewed and all negative except as written in HPI             Past Medical History:   Diagnosis Date    Dementia ([address]      No past surgical history on file. Social Hx:    Family Hx: family history is not on file.   No Known Allergies       OBJECTIVE:  Wt Readings from Last 3 Encounters:   05/21/22 85.2 kg (187 lb 13.3 oz)   03/18/22 82.3 kg (181 lb 7 oz)   01/05/22 92 kg (202 lb 13.2 oz)       Intake/Output Summary (Last 24 hours) at 5/23/2022 1026  Last data filed at 5/22/2022 1539  Gross per 24 hour   Intake    Output 1000 ml   Net -1000 ml         Physical Exam    Vitals:   Vitals:    05/22/22 1400 05/22/22 1420 05/22/22 2004 05/23/22 0746   BP:  (!) 98/58 115/66 117/71   Pulse: (!) 58 (!) 58 85 85   Resp:  18 18 18   Temp:  97.9 °F (36.6 °C) 97.7 °F (36.5 °C) 97 °F (36.1 °C)   SpO2:  93% 93% 100%   Weight:       Height:             Physical exam    General:    Alert, cooperative, no distress, appears stated age. Neck:   Supple, no carotid bruit and no JVD. Back:     Symmetric,    Lungs:     Diminished breath sounds to auscultation bilaterally. O2 via NC, 3L   Heart[de-identified]    Regular rate and rhythm, S1, S2 normal,+KVNG, no click, rub or gallop. Abdomen:     Soft, non-tender. Bowel sounds normal.    Extremities:   Extremities no cyanosis, left leg 1+ edema likely chronic, right leg no edema. Vascular:   Pulses - equal   Skin:   Skin color normal. No rashes or lesions on visible areas   Neurologic:   Alert, Moves all extremities. Data Review:     Radiology:   XR Results (most recent):  Results from Hospital Encounter encounter on 05/17/22    XR CHEST PORT    Narrative  EXAM:  XR CHEST PORT    INDICATION: follow up for pulmonary edema    COMPARISON: 5/17/2022. TECHNIQUE: Single frontal view of the chest.    FINDINGS: No significant change in moderate bilateral effusions and moderate  pulmonary edema type pattern. Unchanged partially obscured but likely enlarged  cardiomediastinal silhouette. No visualized pneumothorax. Atherosclerotic  calcifications of the aorta. Impression  1. No significant change in moderate bilateral pleural effusions and moderate  pulmonary edema.     CT Results (most recent):  Results from Hospital Encounter encounter on 05/17/22    CT HEAD WO CONT    Narrative  INDICATION: dementia, ? b/l cognitive status, needs full dose anticoag possibly    EXAM:  HEAD CT WITHOUT CONTRAST    COMPARISON: March 14, 2022    TECHNIQUE:  Routine noncontrast axial head CT was performed. Sagittal and  coronal reconstructions were generated. CT dose reduction was achieved through use of a standardized protocol tailored  for this examination and automatic exposure control for dose modulation. FINDINGS:    Ventricles: Midline, no hydrocephalus. Intracranial Hemorrhage: None. Brain Parenchyma/Brainstem: Normal for age. Basal Cisterns: Normal.  Paranasal Sinuses: Visualized sinuses are clear. Additional Comments: N/A. Impression  No acute process. 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 The preliminary findings were discussed with the patient's nurse on 1/5/2022 at  [address] hours by Dr. Singh Wood. [phone]    Final report to follow. END PRELIMINARY REPORT    EXAM:  MRI BRAIN WO CONT    INDICATION:    eval for stroke    COMPARISON:  CTA head neck 1/4/2022. CONTRAST: None. TECHNIQUE:  Multiplanar multisequence acquisition without contrast of the brain. FINDINGS:  Punctate acute to subacute infarct in the right occipital lobe. Generalized parenchymal volume loss with commensurate dilation of the sulci and  ventricular system. Scattered periventricular and deep white matter T2/FLAIR  hyperintensities, and mild patchy T2/FLAIR hyperintensity in the juliet,  consistent with mild chronic microvascular ischemic disease. 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 Few chronic microhemorrhages in  the right cerebellum and right temporo-occipital lobe. There is no acute  hemorrhage, extra-axial fluid collection, or mass effect. There is no cerebellar  tonsillar herniation. Expected arterial flow-voids are present.     The paranasal sinuses, mastoid air cells, and middle ears are clear. The orbital  contents are within normal limits with left lens implant. No significant osseous  or scalp lesions are identified. Impression  1. Punctate acute to subacute infarct in the right occipital lobe. 2. Generalized parenchymal volume loss and mild chronic microvascular ischemic  disease. 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 Few  chronic microhemorrhages as above. No results for input(s): CPK, TROIQ in the last 72 hours. No lab exists for component: CKQMB, CPKMB, BMPP  Recent Labs     05/22/22  0754      K 4.1      CO2 34*   BUN 32*   CREA 1.46*   *   PHOS 2.8   CA 8.9     No results for input(s): WBC, HGB, HCT, PLT, HGBEXT, HCTEXT, PLTEXT in the last 72 hours. Recent Labs     05/22/22  0754   AP 65     No results for input(s): CHOL, LDLC in the last 72 hours. No lab exists for component: TGL, HDLC,  HBA1C  No results for input(s): CRP, TSH, TSHEXT in the last 72 hours.     No lab exists for component: ESR        Current meds:    Current Facility-Administered Medications:     carvediloL (COREG) tablet 6.25 mg, 6.25 mg, Oral, BID WITH MEALS, Dmitri SYED NP, 6.25 mg at 05/23/22 0947    artificial saliva (MOUTH KOTE) 1 Spray, 1 Spray, Oral, PRN, Madelin Amaya MD    sodium chloride (NS) flush 5-40 mL, 5-40 mL, IntraVENous, Q8H, Dakota Cabral MD, 10 mL at 05/22/22 2327    sodium chloride (NS) flush 5-40 mL, 5-40 mL, IntraVENous, PRN, Dakota Cabral MD    acetaminophen (TYLENOL) tablet 650 mg, 650 mg, Oral, Q6H PRN, 650 mg at 05/21/22 1609 **OR** acetaminophen (TYLENOL) suppository 650 mg, 650 mg, Rectal, Q6H PRN, Dakota Cabral MD    ondansetron (ZOFRAN ODT) tablet 4 mg, 4 mg, Oral, Q8H PRN **OR** ondansetron (ZOFRAN) injection 4 mg, 4 mg, IntraVENous, Q6H PRN, Dakota Cabral MD    enoxaparin (LOVENOX) injection 30 mg, 30 mg, SubCUTAneous, DAILY, Dakota Cabral, MD, 30 mg at 05/23/22 0947    [Held by provider] bumetanide (BUMEX) injection 1 mg, 1 mg, IntraVENous, Q12H, Billie Garrett MD, 1 mg at 05/22/22 0858    albuterol-ipratropium (DUO-NEB) 2.5 MG-0.5 MG/3 ML, 3 mL, Nebulization, Q4H PRN, Stevie Paz MD    aspirin chewable tablet 81 mg, 81 mg, Oral, DAILY, Stevie Paz MD, 81 mg at 05/23/22 8607    [Held by provider] isosorbide mononitrate ER (IMDUR) tablet 30 mg, 30 mg, Oral, 7am, Stevie Paz MD, 30 mg at 05/22/22 0297    mirtazapine (REMERON) tablet 7.5 mg, 7.5 mg, Oral, QHS, Stevie Paz MD, 7.5 mg at 05/22/22 2323    hydroxypropyl methylcellulose (ISOPTO TEARS) 0.5 % ophthalmic solution 1 Drop, 1 Drop, Both Eyes, PRN, Stevie Paz MD Beatrice Mackintosh, NP  [facility] [address] [place] [address], [address] [place] [address],[address] [place] [address]  [phone]      Archie Pena MD

## 2022-05-23 NOTE — PROGRESS NOTES
[facility] Adult  Hospitalist Group                                                                                          Hospitalist Progress Note  Jay Al MD  Answering service: [phone] from in house phone        Date of Service:  2022  NAME:  Lucy Cruz  :  1931  MRN:  [id number]      Admission Summary:       Lucy Cruz is a 80 y.o. male with a pmhx hypertension, CKD, DM 2, dysphagia, falls, and dementia who presented via EMS for hypoxia, and tachycardia. Per chart record, heart rate was in the 150s, and he was hypoxic to 83%. Obtained from chart review due to patient with tachypnea, and inability to complete full sentences during my evaluation. He was previously hospitalized from 3/14/2022 to 3/18/2022 for LAMINE with hyperkalemia     In the ED, he was tachycardic to the 130s, with recorded SPO2 97 to 100% on BiPAP. Labs are significant for troponin 109, probnp 31,490,  PH 7.29, PCO2 58.5, and PO2 69. Echocardiogram was done, and showed depressed EF 30 to 50%. Chest x-ray significant for moderate pulmonary edema with bilateral pleural effusions. EKG shows atrial fibrillation with  with non-specific ST-T wave changes.     In the ED, he received duo nebs, 2 mg IV Bumex, 125 IV Solu-Medrol, and was placed on BiPAP. Between the time of my chart review, and evaluating the patient he had taken his BiPAP off due to not tolerating the mask. Nursing staff tried a nasal mask without success. Stat ABG ordered, and PCO2 was worsening so had a discussion with the patient, provided reassurance, treated anxiety with Seroquel, and reattempted Pap therapy. He did tolerate at this time, and ABG is improving, so will admit to [facility] at this time.     Seen by Intensivist and recommended non ICU bed    Interval history / Subjective:     Patient poor historian, he said he feels the same, no left side chest pain, palpitation or diaphoresis      Assessment & Plan:     Acute hypoxic, hypercapnic respiratory failure due to pulmonary edema, pleural effusions due to acute systolic CHF EF 13-94%  -BiPAP weaned off, on 2 l/m, SpO2  % monitor pulse ox to wean off oxygen   -s/p steroids   -BP was low, improving, add midodrine 5 mg tid, continue bumex, and coreg, imdur on hold  -strict I&O, and daily weight  -chest x ray on 5/17 moderate pulmonary edema with bilateral pleural effusions  -echo LV EF 15-20%, there are regional wall motion abnormalities, moderate AS  -follow up with cardiologist recommendation  -seen by pulmonologist  -palliative care team consulted    Elevated troponin   -troponin 141, initial was 109   -continue on aspirin, coreg and imdur  -no left side chest pain  -cardiologist on board      Atrial fibrillation, rate controlled  -rate controlled, off cardizem gtt    -discussion regarding risk benefit of anticoagulation due to hx frequent falls, and dementia  -continue coreg and aspirin  -CT head no acute process     AAA   -US of abdomen on 3/14 Aneurysmal dilatation of the abdominal aorta is suggested measuring up to 5.6 cm in size.   -no abdominal pain  -patient with dementia, high risk for intervention    CKD stage IIIa  -creatinine stable  -avoid nephrotoxin   -monitor renal function      Chornic dementia  -Patient with chronic dementia,   -conscious and alert, oriented to place, person and time, answer questions  -continue neuro check and supportive care    Anemia of chronic disease   -Hgb 11.5  -stable    DNR: high risk for decompensation,  palliative care team on board          Code status: DNR  Prophylaxis: Lovenox  Care Plan discussed with: Patient and Nurse   Anticipated Disposition: SNF      Hospital Problems  Never Reviewed          Codes Class Noted POA    Acute systolic heart failure ([facility] [address] ICD-10-CM: I50.21  ICD-9-CM: 428.21  5/23/2022 Unknown        Acute respiratory failure with hypoxia and hypercarbia ([facility] [address] ICD-10-CM: J96.01, J96.02  ICD-9-CM: 518.81  5/18/2022 Unknown        SOB (shortness of breath) ICD-10-CM: R06.02  ICD-9-CM: 786.05  5/17/2022 Unknown                 Vital Signs:    Last 24hrs VS reviewed since prior progress note. Most recent are:  Visit Vitals  /71 (BP 1 Location: Right upper arm, BP Patient Position: At rest)   Pulse 85   Temp 97 °F (36.1 °C)   Resp 18   Ht 5' 2\" (1.575 m)   Wt 85.2 kg (187 lb 13.3 oz)   SpO2 100%   BMI 34.35 kg/m²         Intake/Output Summary (Last 24 hours) at 5/23/2022 4387  Last data filed at 5/22/2022 1539  Gross per 24 hour   Intake    Output 1000 ml   Net -1000 ml        Physical Examination:     I had a face to face encounter with this patient and independently examined them on 5/23/2022 as outlined below:          Constitutional:  No acute distress, cooperative, pleasant    ENT:  Oral mucosa moist, oropharynx benign. Resp:  Decrease bronchial breath sound bilaterally. No wheezing/rhonchi/rales. No accessory muscle use. CV:  Regular rhythm, normal rate, no murmurs, gallops, rubs    GI:  Soft, non distended, non tender. normoactive bowel sounds, no hepatosplenomegaly     Musculoskeletal:  No edema,     Neurologic:  Moves all extremities, conscious and alert, answer questions, moves extremities             Data Review:    Review and/or order of clinical lab test  Review and/or order of tests in the radiology section of CPT  Review and/or order of tests in the medicine section of CPT      Labs:     No results for input(s): WBC, HGB, HCT, PLT, HGBEXT, HCTEXT, PLTEXT, HGBEXT, HCTEXT, PLTEXT in the last 72 hours. Recent Labs     05/22/22  0754      K 4.1      CO2 34*   BUN 32*   CREA 1.46*   *   CA 8.9   PHOS 2.8     Recent Labs     05/22/22  0754   ALT 24   AP 65   TBILI 0.6   TP 7.0   ALB 2.6*   GLOB 4.4*     No results for input(s): INR, PTP, APTT, INREXT, INREXT in the last 72 hours. No results for input(s): FE, TIBC, PSAT, FERR in the last 72 hours.    No results found for: FOL, RBCF   No results for input(s): PH, PCO2, PO2 in the last 72 hours. No results for input(s): CPK, CKNDX, TROIQ in the last 72 hours.     No lab exists for component: CPKMB  Lab Results   Component Value Date/Time    Cholesterol, total 129 01/05/2022 04:48 AM    HDL Cholesterol 35 01/05/2022 04:48 AM    LDL, calculated 67.8 01/05/2022 04:48 AM    Triglyceride 131 01/05/2022 04:48 AM    CHOL/HDL Ratio 3.7 01/05/2022 04:48 AM     Lab Results   Component Value Date/Time    Glucose (POC) 94 01/06/2022 05:45 AM    Glucose (POC) 84 01/06/2022 01:31 AM    Glucose (POC) 111 01/05/2022 04:39 PM    Glucose (POC) 128 (H) 01/05/2022 11:47 AM    Glucose (POC) 90 01/05/2022 07:24 AM     No results found for: COLOR, APPRN, SPGRU, REFSG, JOSE G, PROTU, GLUCU, KETU, BILU, UROU, STEPHANIE, LEUKU, GLUKE, EPSU, BACTU, WBCU, RBCU, CASTS, UCRY      Medications Reviewed:     Current Facility-Administered Medications   Medication Dose Route Frequency    carvediloL (COREG) tablet 6.25 mg  6.25 mg Oral BID WITH MEALS    artificial saliva (MOUTH KOTE) 1 Spray  1 Spray Oral PRN    sodium chloride (NS) flush 5-40 mL  5-40 mL IntraVENous Q8H    sodium chloride (NS) flush 5-40 mL  5-40 mL IntraVENous PRN    acetaminophen (TYLENOL) tablet 650 mg  650 mg Oral Q6H PRN    Or    acetaminophen (TYLENOL) suppository 650 mg  650 mg Rectal Q6H PRN    ondansetron (ZOFRAN ODT) tablet 4 mg  4 mg Oral Q8H PRN    Or    ondansetron (ZOFRAN) injection 4 mg  4 mg IntraVENous Q6H PRN    enoxaparin (LOVENOX) injection 30 mg  30 mg SubCUTAneous DAILY    [Held by provider] bumetanide (BUMEX) injection 1 mg  1 mg IntraVENous Q12H    albuterol-ipratropium (DUO-NEB) 2.5 MG-0.5 MG/3 ML  3 mL Nebulization Q4H PRN    aspirin chewable tablet 81 mg  81 mg Oral DAILY    [Held by provider] isosorbide mononitrate ER (IMDUR) tablet 30 mg  30 mg Oral 7am    mirtazapine (REMERON) tablet 7.5 mg  7.5 mg Oral QHS    hydroxypropyl methylcellulose (ISOPTO TEARS) 0.5 % ophthalmic solution 1 Drop  1 Drop Both Eyes PRN     ______________________________________________________________________  EXPECTED LENGTH OF STAY: 3d 19h  ACTUAL LENGTH OF STAY:          6                 Chelo Huerta MD

## 2022-05-23 NOTE — PROGRESS NOTES
Problem: Mobility Impaired (Adult and Pediatric)  Goal: *Acute Goals and Plan of Care (Insert Text)  Description: FUNCTIONAL STATUS PRIOR TO ADMISSION: Patient ambulated with a rolling walker. HOME SUPPORT PRIOR TO ADMISSION: The patient lived in a LTC facility. Physical Therapy Goals  Initiated 5/18/2022  1. Patient will move from supine to sit and sit to supine , scoot up and down, and roll side to side in bed with supervision/set-up within 7 day(s). 2.  Patient will transfer from bed to chair and chair to bed with supervision/set-up using the least restrictive device within 7 day(s). 3.  Patient will perform sit to stand with supervision/set-up within 7 day(s). 4.  Patient will ambulate with supervision/set-up for 150 feet with the least restrictive device within 7 day(s). Outcome: Progressing Towards Goal   PHYSICAL THERAPY TREATMENT  Patient: Rachel Oh (63 y.o. male)  Date: 5/23/2022  Diagnosis: SOB (shortness of breath) [R06.02]  Acute respiratory failure with hypoxia and hypercarbia (HCC) [J96.01, J96.02] <principal problem not specified>       Precautions: Fall,Skin  Chart, physical therapy assessment, plan of care and goals were reviewed. ASSESSMENT  Patient continues with skilled PT services and is progressing towards goals. Pt presents with generalized weakness, decreased balance, impaired gait, decreased endurance, mild SOB, and desaturated to 83% with O2. Pt making good gains with progression to ambulation for a short distance with rolling walker with minimal assistance.      Current Level of Function Impacting Discharge (mobility/balance): bed mobility with minimal assistance, transfers with moderate x 2, ambulation with rolling walker x 20 feet with minimal assist x 1    Other factors to consider for discharge: fall risk, below baseline, deconditioned, limited by mild SOB         PLAN :  Patient continues to benefit from skilled intervention to address the above impairments. Continue treatment per established plan of care. to address goals. Recommendation for discharge: (in order for the patient to meet his/her long term goals)  Therapy up to 5 days/week in SNF setting    This discharge recommendation:  Has not yet been discussed the attending provider and/or case management    IF patient discharges home will need the following DME: patient owns DME required for discharge, pt in LTC       SUBJECTIVE:   Patient stated I have been walking to the bathroom with the nurses.     OBJECTIVE DATA SUMMARY:   Critical Behavior:  Neurologic State: Alert  Cognition: Follows commands  Safety/Judgement: Awareness of environment,Decreased insight into deficits,Fall prevention  Functional Mobility Training:  Bed Mobility:     Supine to Sit: Minimum assistance;Assist x1;Additional time; Adaptive equipment     Scooting: Moderate assistance;Assist x2; Additional time        Transfers:  Sit to Stand: Moderate assistance;Assist x2; Additional time  Stand to Sit: Minimum assistance;Assist x2                             Balance:  Sitting: Intact  Standing: Impaired  Standing - Static: Constant support; Fair  Standing - Dynamic : Constant support; Fair  Ambulation/Gait Training:  Distance (ft): 20 Feet (ft)  Assistive Device: Walker, rolling;Gait belt  Ambulation - Level of Assistance: Minimal assistance;Assist x1        Gait Abnormalities: Decreased step clearance;Trunk sway increased (flexed posture)              Speed/Danisha: Slow  Step Length: Right shortened;Left shortened  Swing Pattern: Right asymmetrical              Pain Rating:  Denies pain     Activity Tolerance:   Fair, desaturates with exertion and requires oxygen, and observed SOB with activity, SPO2 83% following ambulation with 3 L O2    After treatment patient left in no apparent distress:   Sitting in chair, Call bell within reach, and Bed / chair alarm activated    COMMUNICATION/COLLABORATION:   The patients plan of care was discussed with: Registered nurse.      Rosalva Garcia   Time Calculation: 16 mins

## 2022-05-23 NOTE — PROGRESS NOTES
Physician Progress Note      Cyrus Gonzalez  CSN #:                  [id number]  :                       1931  ADMIT DATE:       2022 1:38 PM  [address] [place] DATE:  RESPONDING  PROVIDER #:        Nabeel Lisa MD          QUERY TEXT:    Dear attending,    Pt admitted with acute respiratory failure and has systolic CHF documented. If possible, please document in progress notes and discharge summary further specificity regarding the acuity of CHF:      The medical record reflects the following:  Risk Factors: Hx. HTN  Clinical Indicators: NT-pro-BNP 31,490, - Nt-pro-BNP >74920  22-Echo- Left Ventricle: Left ventricle size is normal. Normal wall thickness. Normal wall motion. Low normal left ventricular systolic function with a visually estimated EF of 50 - 55%. Normal diastolic function. -Echo- Left Ventricle: Severely reduced left ventricular systolic function with a visually estimated EF of 15 - 20%. Left ventricle size is normal. Normal wall thickness. There are regional wall motion abnormalities. Aortic Valve: Low flow/low gradient moderate  aortic stenosis with low EF. AV mean gradient is 9 mmHg. AV peak gradient is 16 mmHg. AV area by continuity VTI is 1.6 cm2. Mitral Valve: Moderate annular calcification of the mitral valve. Pericardium: Left pleural effusion. 20-Per cardiology- Hypercapnic respiratory failure/elevated BNP: I/O +300 yesterday. Creatinine 1.56 up slightly from 1.51 yest. BNP 30,940. cont on Bumex 1mg IV q12hrs. Echo is ordered. Strict I/O, daily weights. With lower EF per POC echo in ED add Coreg, cont Imdur for now. Hold off on ACE d/t renal function.   -Per PN- Acute hypoxic, hypercapnic respiratory failure due to pulmonary edema, pleural effusions due to systolic CHF EF 19-63%QPFRRXKE probnp  BiPAP weaned off, on 2 l/m, SpO2  % monitor pulse ox to wean off oxygen  s/p steroids  chest x ray on  moderate pulmonary edema with bilateral pleural effusions  echo LV EF 15-20%, there are regional wall motion abnormalities, moderate AS    Treatment: IV Bumex 1 mg q 12 hours, Coreg 6.25 mg po bid started on 5/20 strict I&O, and daily weight, Monitor vital signs, labwork, imaging, pulse oximetry, Cardiology consult,      Thank you,  Roxann Headley RN, BSN  Clinical documentation Improvement  [phone]  Options provided:  -- Acute Systolic CHF/HFrEF  -- Systolic CHF/HFrEF, please specify acuity. -- Other - I will add my own diagnosis  -- Disagree - Not applicable / Not valid  -- Disagree - Clinically unable to determine / Unknown  -- Refer to Clinical Documentation Reviewer    PROVIDER RESPONSE TEXT:    This patient is in acute systolic CHF/HFrEF.     Query created by: Anthony Olivera on 5/23/2022 6:32 AM      Electronically signed by:  Melony Adler MD 5/23/2022 8:39 AM

## 2022-05-24 VITALS
TEMPERATURE: 98.1 F | RESPIRATION RATE: 18 BRPM | SYSTOLIC BLOOD PRESSURE: 103 MMHG | HEART RATE: 61 BPM | HEIGHT: 62 IN | BODY MASS INDEX: 34.57 KG/M2 | DIASTOLIC BLOOD PRESSURE: 69 MMHG | WEIGHT: 187.83 LBS | OXYGEN SATURATION: 94 %

## 2022-05-24 LAB
ALBUMIN SERPL-MCNC: 2.5 G/DL (ref 3.5–5)
ALBUMIN/GLOB SERPL: 0.7 {RATIO} (ref 1.1–2.2)
ALP SERPL-CCNC: 56 U/L (ref 45–117)
ALT SERPL-CCNC: 16 U/L (ref 12–78)
ANION GAP SERPL CALC-SCNC: 3 MMOL/L (ref 5–15)
AST SERPL-CCNC: 11 U/L (ref 15–37)
BILIRUB SERPL-MCNC: 0.5 MG/DL (ref 0.2–1)
BNP SERPL-MCNC: ABNORMAL PG/ML
BUN SERPL-MCNC: 28 MG/DL (ref 6–20)
BUN/CREAT SERPL: 22 (ref 12–20)
CALCIUM SERPL-MCNC: 8.7 MG/DL (ref 8.5–10.1)
CHLORIDE SERPL-SCNC: 102 MMOL/L (ref 97–108)
CO2 SERPL-SCNC: 37 MMOL/L (ref 21–32)
COVID-19 RAPID TEST, COVR: NOT DETECTED
CREAT SERPL-MCNC: 1.27 MG/DL (ref 0.7–1.3)
ERYTHROCYTE [DISTWIDTH] IN BLOOD BY AUTOMATED COUNT: 15.1 % (ref 11.5–14.5)
GLOBULIN SER CALC-MCNC: 3.4 G/DL (ref 2–4)
GLUCOSE SERPL-MCNC: 141 MG/DL (ref 65–100)
HCT VFR BLD AUTO: 37.8 % (ref 36.6–50.3)
HGB BLD-MCNC: 11.1 G/DL (ref 12.1–17)
MCH RBC QN AUTO: 31.2 PG (ref 26–34)
MCHC RBC AUTO-ENTMCNC: 29.4 G/DL (ref 30–36.5)
MCV RBC AUTO: 106.2 FL (ref 80–99)
NRBC # BLD: 0 K/UL (ref 0–0.01)
NRBC BLD-RTO: 0 PER 100 WBC
PHOSPHATE SERPL-MCNC: 2.4 MG/DL (ref 2.6–4.7)
PLATELET # BLD AUTO: 220 K/UL (ref 150–400)
PMV BLD AUTO: 11.4 FL (ref 8.9–12.9)
POTASSIUM SERPL-SCNC: 4 MMOL/L (ref 3.5–5.1)
PROCALCITONIN SERPL-MCNC: <0.05 NG/ML
PROT SERPL-MCNC: 5.9 G/DL (ref 6.4–8.2)
RBC # BLD AUTO: 3.56 M/UL (ref 4.1–5.7)
SODIUM SERPL-SCNC: 142 MMOL/L (ref 136–145)
SOURCE, COVRS: NORMAL
WBC # BLD AUTO: 5.7 K/UL (ref 4.1–11.1)

## 2022-05-24 PROCEDURE — 74011250637 HC RX REV CODE- 250/637: Performed by: NURSE PRACTITIONER

## 2022-05-24 PROCEDURE — 36415 COLL VENOUS BLD VENIPUNCTURE: CPT

## 2022-05-24 PROCEDURE — 85027 COMPLETE CBC AUTOMATED: CPT

## 2022-05-24 PROCEDURE — 87635 SARS-COV-2 COVID-19 AMP PRB: CPT

## 2022-05-24 PROCEDURE — 94760 N-INVAS EAR/PLS OXIMETRY 1: CPT

## 2022-05-24 PROCEDURE — 74011250637 HC RX REV CODE- 250/637: Performed by: HOSPITALIST

## 2022-05-24 PROCEDURE — 84100 ASSAY OF PHOSPHORUS: CPT

## 2022-05-24 PROCEDURE — 80053 COMPREHEN METABOLIC PANEL: CPT

## 2022-05-24 PROCEDURE — 83880 ASSAY OF NATRIURETIC PEPTIDE: CPT

## 2022-05-24 PROCEDURE — 99232 SBSQ HOSP IP/OBS MODERATE 35: CPT | Performed by: SPECIALIST

## 2022-05-24 PROCEDURE — 74011250636 HC RX REV CODE- 250/636: Performed by: FAMILY MEDICINE

## 2022-05-24 PROCEDURE — 84145 PROCALCITONIN (PCT): CPT

## 2022-05-24 PROCEDURE — 74011250637 HC RX REV CODE- 250/637: Performed by: STUDENT IN AN ORGANIZED HEALTH CARE EDUCATION/TRAINING PROGRAM

## 2022-05-24 PROCEDURE — 97535 SELF CARE MNGMENT TRAINING: CPT

## 2022-05-24 RX ORDER — MIDODRINE HYDROCHLORIDE 5 MG/1
5 TABLET ORAL
Qty: 90 TABLET | Refills: 0 | Status: SHIPPED
Start: 2022-05-24 | End: 2022-06-23

## 2022-05-24 RX ORDER — CARVEDILOL 6.25 MG/1
6.25 TABLET ORAL 2 TIMES DAILY WITH MEALS
Qty: 60 TABLET | Refills: 0 | Status: SHIPPED
Start: 2022-05-24

## 2022-05-24 RX ORDER — BUMETANIDE 1 MG/1
1 TABLET ORAL 2 TIMES DAILY
Qty: 60 TABLET | Refills: 0 | Status: SHIPPED
Start: 2022-05-24

## 2022-05-24 RX ADMIN — BUMETANIDE 1 MG: 1 TABLET ORAL at 09:11

## 2022-05-24 RX ADMIN — MIDODRINE HYDROCHLORIDE 5 MG: 5 TABLET ORAL at 12:40

## 2022-05-24 RX ADMIN — MIDODRINE HYDROCHLORIDE 5 MG: 5 TABLET ORAL at 09:11

## 2022-05-24 RX ADMIN — ASPIRIN 81 MG CHEWABLE TABLET 81 MG: 81 TABLET CHEWABLE at 09:11

## 2022-05-24 RX ADMIN — CARVEDILOL 6.25 MG: 6.25 TABLET, FILM COATED ORAL at 09:11

## 2022-05-24 RX ADMIN — CARVEDILOL 6.25 MG: 6.25 TABLET, FILM COATED ORAL at 17:45

## 2022-05-24 RX ADMIN — MIDODRINE HYDROCHLORIDE 5 MG: 5 TABLET ORAL at 17:45

## 2022-05-24 RX ADMIN — ENOXAPARIN SODIUM 30 MG: 100 INJECTION SUBCUTANEOUS at 09:11

## 2022-05-24 RX ADMIN — BUMETANIDE 1 MG: 1 TABLET ORAL at 17:45

## 2022-05-24 NOTE — PROGRESS NOTES
[facility]  Cardiology Care Note                  []Initial visit     [x]Established visit     Reason for consult: AFL, CHF    SUBJECTIVE:    Lying flat with no distress   Denies CP, SOB/[place], orthopnea, PND, or palpitations. Assessment and Plan     1. Atrial flutter with RVR:   initial presentation with -150s. On Diltiazem gtt converted converted to SR with short occasional bursts of AFL that is rate controlled. Cont on Coreg 6.25mg bid. No OAC at this time, poor candidate with risk for bleeding at age 80, poor historian, bleed risk.     2. Hypercapnic respiratory failure/elevated BNP:   Improved symptomatically. Appears compensated on O2. Echo shows EF 15-20%, mod AS, left pleural effusion. cont on Coreg, currently Bumex and Imdur are being held d/t hypotension, improved this am. Restart at least Bumex as tolerated with low EF and pleural effusion. Hold off on ACE d/t renal function. Recommend transitioning Bumex to po. Note plans for dc  3. HTN:   Patient Vitals for the past 12 hrs:   Temp Pulse Resp BP SpO2   05/24/22 1744 98.1 °F (36.7 °C) 61 18 103/69 94 %   05/24/22 1620   17     05/24/22 1448 98.6 °F (37 °C) (!) 51 18 (!) 94/55 99 %      Discharge Medication List as of 5/24/2022  3:33 PM      START taking these medications    Details   artificial saliva (MOUTH KOTE) spra Take 1 Spray by mouth as needed for PRN Reason (Other). , No Print, Disp-30 mL, R-0      midodrine (PROAMATINE) 5 mg tablet Take 1 Tablet by mouth three (3) times daily (with meals) for 30 days. , No Print, Disp-90 Tablet, R-0      carvediloL (COREG) 6.25 mg tablet Take 1 Tablet by mouth two (2) times daily (with meals). , No Print, Disp-60 Tablet, R-0      bumetanide (BUMEX) 1 mg tablet Take 1 Tablet by mouth two (2) times a day., No Print, Disp-60 Tablet, R-0         CONTINUE these medications which have NOT CHANGED Details   dulaglutide (Trulicity) 3 PF/6.1 mL pnij 0.5 mL by SubCUTAneous route every Monday., Historical Med      albuterol (Ventolin HFA) 90 mcg/actuation inhaler Take 2 Puffs by inhalation every four (4) hours as needed for Wheezing., Historical Med      acetaminophen (TYLENOL) 325 mg tablet Take 650 mg by mouth every four (4) hours as needed for Pain or Fever., Historical Med      polyvinyl alcohol (LIQUIFILM TEARS) 1.4 % ophthalmic solution Administer 1 Drop to both eyes as needed., Historical Med      aspirin 81 mg chewable tablet Take 81 mg by mouth daily. , Historical Med      calcium carbonate (OS-LUL) 500 mg calcium (1,250 mg) tablet Take  by mouth daily. , Historical Med      cholecalciferol (VITAMIN D3) (1000 Units /25 mcg) tablet Take 4,000 Units by mouth daily. , Historical Med      ezetimibe (ZETIA) 10 mg tablet Take 0.5 mg by mouth daily. , Historical Med      ferrous sulfate 325 mg (65 mg iron) tablet Take  by mouth two (2) times a day., Historical Med      fluticasone propionate (FLONASE) 50 mcg/actuation nasal spray 2 Sprays by Both Nostrils route daily. , Historical Med      loratadine 10 mg cap Take 10 mg by mouth daily. , Historical Med      mirtazapine (REMERON) 7.5 mg tablet Take 7.5 mg by mouth nightly., Historical Med      omeprazole (PRILOSEC) 20 mg capsule Take 20 mg by mouth daily. , Historical Med         STOP taking these medications       furosemide (LASIX) 40 mg tablet Comments:   Reason for Stopping:         isosorbide mononitrate ER (IMDUR) 30 mg tablet Comments:   Reason for Stoppin. 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   Social Hx: Family Hx: family history is not on file. No Known Allergies       OBJECTIVE:  Wt Readings from Last 3 Encounters:   05/21/22 187 lb 13.3 oz (85.2 kg)   03/18/22 181 lb 7 oz (82.3 kg)   01/05/22 202 lb 13.2 oz (92 kg)     No intake or output data in the 24 hours ending 05/24/22 1429      Physical Exam    Vitals:   Vitals:    05/23/22 1419 05/23/22 1959 05/24/22 0418 05/24/22 0902   BP: 129/69 129/73 111/75 105/65   Pulse: (!) 58 (!) 55 (!) 57 65   Resp: 18 18 18 18   Temp: 97.8 °F (36.6 °C) 98.4 °F (36.9 °C) 97.8 °F (36.6 °C) 97.7 °F (36.5 °C)   SpO2: 97% 100% 98% 100%   Weight:       Height:             Physical exam    General:    Alert, cooperative, no distress, appears stated age. Neck:   Supple, no carotid bruit and no JVD. Back:     Symmetric,    Lungs:     Diminished breath sounds to auscultation bilaterally. O2 via NC, 3L   Heart[de-identified]    Regular rate and rhythm, S1, S2 normal,+KVNG, no click, rub or gallop. Abdomen:     Soft, non-tender. Bowel sounds normal.    Extremities:   Extremities no cyanosis, left leg 1+ edema likely chronic, right leg no edema. Vascular:   Pulses - equal   Skin:   Skin color normal. No rashes or lesions on visible areas   Neurologic:   Alert, Moves all extremities. Data Review:     Radiology:   XR Results (most recent):  Results from Hospital Encounter encounter on 05/17/22    XR CHEST PORT    Narrative  EXAM:  XR CHEST PORT    INDICATION: follow up for pulmonary edema    COMPARISON: 5/17/2022. TECHNIQUE: Single frontal view of the chest.    FINDINGS: No significant change in moderate bilateral effusions and moderate  pulmonary edema type pattern. Unchanged partially obscured but likely enlarged  cardiomediastinal silhouette. No visualized pneumothorax. Atherosclerotic  calcifications of the aorta. Impression  1. No significant change in moderate bilateral pleural effusions and moderate  pulmonary edema.     CT Results (most recent):  Results from [facility] encounter on 05/17/22    CT HEAD WO CONT    Narrative  INDICATION: dementia, ? b/l cognitive status, needs full dose anticoag possibly    EXAM:  HEAD CT WITHOUT CONTRAST    COMPARISON: March 14, 2022    TECHNIQUE:  Routine noncontrast axial head CT was performed. Sagittal and  coronal reconstructions were generated. CT dose reduction was achieved through use of a standardized protocol tailored  for this examination and automatic exposure control for dose modulation. FINDINGS:    Ventricles: Midline, no hydrocephalus. Intracranial Hemorrhage: None. Brain Parenchyma/Brainstem: Normal for age. Basal Cisterns: Normal.  Paranasal Sinuses: Visualized sinuses are clear. Additional Comments: N/A. Impression  No acute process. 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 The preliminary findings were discussed with the patient's nurse on 1/5/2022 at  [address] hours by Dr. Singh Wodo. [phone]    Final report to follow. END PRELIMINARY REPORT    EXAM:  MRI BRAIN WO CONT    INDICATION:    eval for stroke    COMPARISON:  CTA head neck 1/4/2022. CONTRAST: None. TECHNIQUE:  Multiplanar multisequence acquisition without contrast of the brain. FINDINGS:  Punctate acute to subacute infarct in the right occipital lobe. Generalized parenchymal volume loss with commensurate dilation of the sulci and  ventricular system. Scattered periventricular and deep white matter T2/FLAIR  hyperintensities, and mild patchy T2/FLAIR hyperintensity in the juliet,  consistent with mild chronic microvascular ischemic disease. 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 Few chronic microhemorrhages in  the right cerebellum and right temporo-occipital lobe. There is no acute  hemorrhage, extra-axial fluid collection, or mass effect.  There is no cerebellar  tonsillar herniation. Expected arterial flow-voids are present. The paranasal sinuses, mastoid air cells, and middle ears are clear. The orbital  contents are within normal limits with left lens implant. No significant osseous  or scalp lesions are identified. Impression  1. Punctate acute to subacute infarct in the right occipital lobe. 2. Generalized parenchymal volume loss and mild chronic microvascular ischemic  disease. 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 Few  chronic microhemorrhages as above. No results for input(s): CPK, TROIQ in the last 72 hours. No lab exists for component: CKQMB, CPKMB, BMPP  Recent Labs     05/24/22  0327 05/22/22  0754    141   K 4.0 4.1    105   CO2 37* 34*   BUN 28* 32*   CREA 1.27 1.46*   * 110*   PHOS 2.4* 2.8   CA 8.7 8.9     Recent Labs     05/24/22  0636   WBC 5.7   HGB 11.1*   HCT 37.8        Recent Labs     05/24/22  0327 05/22/22  0754   AP 56 65     No results for input(s): CHOL, LDLC in the last 72 hours. No lab exists for component: TGL, HDLC,  HBA1C  No results for input(s): CRP, TSH, TSHEXT, TSHEXT in the last 72 hours.     No lab exists for component: ESR        Current meds:    Current Facility-Administered Medications:     midodrine (PROAMATINE) tablet 5 mg, 5 mg, Oral, TID WITH MEALS, Ramiro Bazan, Gómez VELASQUEZ MD, 5 mg at 05/24/22 1240    bumetanide (BUMEX) tablet 1 mg, 1 mg, Oral, BID, Beverly SYED NP, 1 mg at 05/24/22 0911    carvediloL (COREG) tablet 6.25 mg, 6.25 mg, Oral, BID WITH MEALS, Beverly SYED NP, 6.25 mg at 05/24/22 0911    artificial saliva (MOUTH KOTE) 1 Spray, 1 Spray, Oral, PRN, Roney Wright MD    sodium chloride (NS) flush 5-40 mL, 5-40 mL, IntraVENous, Q8H, Indio Butler MD, 10 mL at 05/23/22 2150    sodium chloride (NS) flush 5-40 mL, 5-40 mL, IntraVENous, PRN, Indio Butler MD    acetaminophen (TYLENOL) tablet 650 mg, 650 mg, Oral, Q6H PRN, 650 mg at 05/21/22 1609 **OR** acetaminophen (TYLENOL) suppository 650 mg, 650 mg, Rectal, Q6H PRN, Elly Mijares MD    ondansetron (ZOFRAN ODT) tablet 4 mg, 4 mg, Oral, Q8H PRN **OR** ondansetron (ZOFRAN) injection 4 mg, 4 mg, IntraVENous, Q6H PRN, Elly Mijares MD    enoxaparin (LOVENOX) injection 30 mg, 30 mg, SubCUTAneous, DAILY, Elly Mijares MD, 30 mg at 05/24/22 0911    albuterol-ipratropium (DUO-NEB) 2.5 MG-0.5 MG/3 ML, 3 mL, Nebulization, Q4H PRN, Ryan Anthony MD    aspirin chewable tablet 81 mg, 81 mg, Oral, DAILY, Ryan Anthony MD, 81 mg at 05/24/22 0911    [Held by provider] isosorbide mononitrate ER (IMDUR) tablet 30 mg, 30 mg, Oral, 7am, Ryan Anthony MD, 30 mg at 05/22/22 7751    mirtazapine (REMERON) tablet 7.5 mg, 7.5 mg, Oral, QHS, Ryan Anthony MD, 7.5 mg at 05/23/22 2150    hydroxypropyl methylcellulose (ISOPTO TEARS) 0.5 % ophthalmic solution 1 Drop, 1 Drop, Both Eyes, PRN, Ryan Anthony MD Caryn Dolores, MD  [facility] [address] [address]  [address] [facility]  [phone]      Do Salgado MD

## 2022-05-24 NOTE — PROGRESS NOTES
Problem: Pressure Injury - Risk of  Goal: *Prevention of pressure injury  Description: Document Jonathon Scale and appropriate interventions in the flowsheet.   Outcome: Progressing Towards Goal  Note: Pressure Injury Interventions:  Sensory Interventions: Assess need for specialty bed,Assess changes in LOC    Moisture Interventions: Absorbent underpads,Check for incontinence Q2 hours and as needed    Activity Interventions: Pressure redistribution bed/mattress(bed type)    Mobility Interventions: Pressure redistribution bed/mattress (bed type)    Nutrition Interventions: Offer support with meals,snacks and hydration    Friction and Shear Interventions: Lift sheet,HOB 30 degrees or less

## 2022-05-24 NOTE — PROGRESS NOTES
Problem: Self Care Deficits Care Plan (Adult)  Goal: *Acute Goals and Plan of Care (Insert Text)  Description: FUNCTIONAL STATUS PRIOR TO ADMISSION: Patient unable to provide background information - reports being able to dress and toilet himself and needing only minimal assistance for bathing, however, chart review reporting patient needing significant assist with all ADLs. Patient in LT memory care unit at baseline. Wife is involved in his care. Occupational Therapy Goals  Initiated 5/18/2022  1. Patient will perform 2 grooming tasks with moderate assistance within 7 day(s). 2.  Patient will perform anterior neck to thigh bathing with moderate assistance  within 7 day(s). 3.  Patient will perform toilet transfers to/from [facility] with moderate assistance within 7 day(s). 4.  Patient will perform all aspects of toileting with moderate assistance within 7 day(s). Outcome: Progressing Towards Goal   OCCUPATIONAL THERAPY TREATMENT  Patient: Isaac Atkins (6 y.o. male)  Date: 5/24/2022  Diagnosis: SOB (shortness of breath) [R06.02]  Acute respiratory failure with hypoxia and hypercarbia (HCC) [J96.01, J96.02] <principal problem not specified>       Precautions: Fall,Skin  Chart, occupational therapy assessment, plan of care, and goals were reviewed. ASSESSMENT  Patient continues with skilled OT services and is progressing towards goals. Pt received supine in bed asking to sit in chair. Pt on 3L O2 at 95%. Performed new hygiene supine in bed rolling side to side, total assist with hygiene and undergarment mgnt, however, pt able to assist with rolling side to side using bed rails. Supine to sit to EOB with mod assist x 1. Transferred from bed to chair with min assist x 2 using RW. Limited by impaired balance, limited activity tolerance, and generalized weakness. Pt is below his baseline. Recommend SNF and transition back to memory care unit. Possible d/c today.     Current Level of Function Impacting Discharge (ADLs): min assist x 2 with transfers, total assist with new hygience    Other factors to consider for discharge: memory loss         PLAN :  Patient continues to benefit from skilled intervention to address the above impairments. Continue treatment per established plan of care to address goals. Recommend with staff:     Recommend next OT session: see goals    Recommendation for discharge: (in order for the patient to meet his/her long term goals)  Therapy up to 5 days/week in SNF setting    This discharge recommendation:  A follow-up discussion with the attending provider and/or case management is planned    IF patient discharges home will need the following DME: none       SUBJECTIVE:   Patient stated I want to get up.     OBJECTIVE DATA SUMMARY:   Cognitive/Behavioral Status:  Neurologic State: Alert  Orientation Level: Oriented to person;Oriented to place;Oriented to situation;Disoriented to time  Cognition: Follows commands        Safety/Judgement: Awareness of environment    Functional Mobility and Transfers for ADLs:  Bed Mobility:  Supine to Sit: Minimum assistance; Moderate assistance;Assist x1    Transfers:  Sit to Stand: Assist x2;Minimum assistance     Bed to Chair: Minimum assistance;Assist x2    Balance:  Sitting: Intact  Standing: Impaired; With support    ADL Intervention:         Lower Body Bathing  Perineal  : Total assistance (dependent)  Position Performed: Other (comment) (sidelying)         Lower Body Dressing Assistance  Protective Undergarmet: Total assistance (dependent)    Toileting  Bladder Hygiene:  Total assistance (dependent)  Clothing Management: Total assistance (dependent)    Cognitive Retraining  Safety/Judgement: Awareness of environment        Pain:  No c/o pain    Activity Tolerance:   Fair    After treatment patient left in no apparent distress:   Sitting in chair and Call bell within reach    COMMUNICATION/COLLABORATION:   The patients plan of care was discussed with: Physical therapist.     Paris Méndez, OTR/L  Time Calculation: 14 mins

## 2022-05-24 NOTE — PROGRESS NOTES
Problem: Pressure Injury - Risk of  Goal: *Prevention of pressure injury  Description: Document Jonathon Scale and appropriate interventions in the flowsheet. Outcome: Resolved/Met  Note: Pressure Injury Interventions:  Sensory Interventions: Assess need for specialty bed,Assess changes in LOC    Moisture Interventions: Absorbent underpads,Check for incontinence Q2 hours and as needed    Activity Interventions: PT/OT evaluation    Mobility Interventions: PT/OT evaluation    Nutrition Interventions: Offer support with meals,snacks and hydration    Friction and Shear Interventions: Lift sheet,HOB 30 degrees or less                Problem: Patient Education: Go to Patient Education Activity  Goal: Patient/Family Education  Outcome: Resolved/Met     Problem: Falls - Risk of  Goal: *Absence of Falls  Description: Document Jigna Fall Risk and appropriate interventions in the flowsheet.   Outcome: Resolved/Met  Note: Fall Risk Interventions:  Mobility Interventions: Patient to call before getting OOB    Mentation Interventions: Reorient patient    Medication Interventions: Teach patient to arise slowly,Patient to call before getting OOB    Elimination Interventions: Call light in reach,Bed/chair exit alarm              Problem: Patient Education: Go to Patient Education Activity  Goal: Patient/Family Education  Outcome: Resolved/Met     Problem: Patient Education: Go to Patient Education Activity  Goal: Patient/Family Education  Outcome: Resolved/Met     Problem: Patient Education: Go to Patient Education Activity  Goal: Patient/Family Education  Outcome: Resolved/Met     Problem: Patient Education: Go to Patient Education Activity  Goal: Patient/Family Education  Outcome: Resolved/Met     Problem: Patient Education: Go to Patient Education Activity  Goal: Patient/Family Education  Outcome: Resolved/Met     Problem: Discharge Planning  Goal: *Discharge to safe environment  Outcome: Resolved/Met  Goal: *Knowledge of medication management  Outcome: Resolved/Met  Goal: *Knowledge of discharge instructions  Outcome: Resolved/Met     Problem: Patient Education: Go to Patient Education Activity  Goal: Patient/Family Education  Outcome: Resolved/Met

## 2022-05-24 NOTE — PROGRESS NOTES
[facility] [facility]                                                                                          Hospitalist Progress Note  Simón Champion MD  Answering service: [phone] from in house phone        Date of Service:  2022  NAME:  Los Quiles  :  1931  MRN:  [id number]      Admission Summary:       Los Quiles is a 80 y.o. male with a pmhx hypertension, CKD, DM 2, dysphagia, falls, and dementia who presented via EMS for hypoxia, and tachycardia. Per chart record, heart rate was in the 150s, and he was hypoxic to 83%. Obtained from chart review due to patient with tachypnea, and inability to complete full sentences during my evaluation. He was previously hospitalized from 3/14/2022 to 3/18/2022 for LAMINE with hyperkalemia     In the ED, he was tachycardic to the 130s, with recorded SPO2 97 to 100% on BiPAP. Labs are significant for troponin 109, probnp 31,490,  PH 7.29, PCO2 58.5, and PO2 69. Echocardiogram was done, and showed depressed EF 30 to 50%. Chest x-ray significant for moderate pulmonary edema with bilateral pleural effusions. EKG shows atrial fibrillation with  with non-specific ST-T wave changes.     In the ED, he received duo nebs, 2 mg IV Bumex, 125 IV Solu-Medrol, and was placed on BiPAP. Between the time of my chart review, and evaluating the patient he had taken his BiPAP off due to not tolerating the mask. Nursing staff tried a nasal mask without success. Stat ABG ordered, and PCO2 was worsening so had a discussion with the patient, provided reassurance, treated anxiety with Seroquel, and reattempted Pap therapy. He did tolerate at this time, and ABG is improving, so will admit to [facility] at this time.     Seen by Intensivist and recommended non ICU bed    Interval history / Subjective:     He said he feels the same, no left side chest pain, palpitation or diaphoresis      Assessment & Plan:     Acute hypoxic, hypercapnic respiratory failure due to pulmonary edema, pleural effusions due to acute systolic CHF EF 28-23%  -BiPAP weaned off, on 2 l/m, SpO2  % monitor pulse ox to wean off oxygen   -s/p steroids   -BP was low, improving, add midodrine 5 mg tid, continue bumex, and coreg, imdur on hold  -strict I&O, and daily weight  -chest x ray on 5/17 moderate pulmonary edema with bilateral pleural effusions  -echo LV EF 15-20%, there are regional wall motion abnormalities, moderate AS  -follow up with cardiologist recommendation  -seen by pulmonologist  -palliative care team consulted    Elevated troponin   -troponin 141, initial was 109   -continue on aspirin, coreg and imdur  -no left side chest pain  -cardiologist on board      Atrial fibrillation, rate controlled  -rate controlled, off cardizem gtt    -discussion regarding risk benefit of anticoagulation due to hx frequent falls, and dementia  -continue coreg and aspirin  -CT head no acute process     AAA   -US of abdomen on 3/14 Aneurysmal dilatation of the abdominal aorta is suggested measuring up to 5.6 cm in size.   -no abdominal pain  -patient with dementia, high risk for intervention    CKD stage IIIa  -creatinine stable  -avoid nephrotoxin   -monitor renal function      Chornic dementia  -Patient with chronic dementia,   -conscious and alert, oriented to place, person and time, answer questions  -continue neuro check and supportive care    Anemia of chronic disease   -Hgb 11.5  -stable    DNR: high risk for decompensation,  palliative care team on board          Code status: DNR  Prophylaxis: Lovenox  Care Plan discussed with: Patient and Nurse   Anticipated Disposition: SNF      Hospital Problems  Never Reviewed          Codes Class Noted POA    Acute systolic heart failure ([facility] [address] ICD-10-CM: I50.21  ICD-9-CM: 428.21  5/23/2022 Unknown        Acute respiratory failure with hypoxia and hypercarbia ([facility] [address] ICD-10-CM: J96.01, J96.02  ICD-9-CM: 518.81  5/18/2022 Unknown        SOB (shortness of breath) ICD-10-CM: R06.02  ICD-9-CM: 786.05  5/17/2022 Unknown                 Vital Signs:    Last 24hrs VS reviewed since prior progress note. Most recent are:  Visit Vitals  /65   Pulse 65   Temp 97.7 °F (36.5 °C)   Resp 18   Ht 5' 2\" (1.575 m)   Wt 85.2 kg (187 lb 13.3 oz)   SpO2 100%   BMI 34.35 kg/m²       No intake or output data in the 24 hours ending 05/24/22 1031     Physical Examination:     I had a face to face encounter with this patient and independently examined them on 5/24/2022 as outlined below:          Constitutional:  No acute distress, cooperative, pleasant    ENT:  Oral mucosa moist, oropharynx benign. Resp:  Decrease bronchial breath sound bilaterally. No wheezing/rhonchi/rales. No accessory muscle use. CV:  Regular rhythm, normal rate, no murmurs, gallops, rubs    GI:  Soft, non distended, non tender. normoactive bowel sounds, no hepatosplenomegaly     Musculoskeletal:  No edema,     Neurologic:  Moves all extremities, conscious and alert, answer questions, moves extremities             Data Review:    Review and/or order of clinical lab test  Review and/or order of tests in the radiology section of CPT  Review and/or order of tests in the medicine section of CPT      Labs:     Recent Labs     05/24/22  0636   WBC 5.7   HGB 11.1*   HCT 37.8        Recent Labs     05/24/22  0327 05/22/22  0754    141   K 4.0 4.1    105   CO2 37* 34*   BUN 28* 32*   CREA 1.27 1.46*   * 110*   CA 8.7 8.9   PHOS 2.4* 2.8     Recent Labs     05/24/22  0327 05/22/22  0754   ALT 16 24   AP 56 65   TBILI 0.5 0.6   TP 5.9* 7.0   ALB 2.5* 2.6*   GLOB 3.4 4.4*     No results for input(s): INR, PTP, APTT, INREXT, INREXT in the last 72 hours. No results for input(s): FE, TIBC, PSAT, FERR in the last 72 hours. No results found for: FOL, RBCF   No results for input(s): PH, PCO2, PO2 in the last 72 hours.   No results for input(s): CPK, CKNDX, TROIQ in the last 72 hours.     No lab exists for component: CPKMB  Lab Results   Component Value Date/Time    Cholesterol, total 129 01/05/2022 04:48 AM    HDL Cholesterol 35 01/05/2022 04:48 AM    LDL, calculated 67.8 01/05/2022 04:48 AM    Triglyceride 131 01/05/2022 04:48 AM    CHOL/HDL Ratio 3.7 01/05/2022 04:48 AM     Lab Results   Component Value Date/Time    Glucose (POC) 94 01/06/2022 05:45 AM    Glucose (POC) 84 01/06/2022 01:31 AM    Glucose (POC) 111 01/05/2022 04:39 PM    Glucose (POC) 128 (H) 01/05/2022 11:47 AM    Glucose (POC) 90 01/05/2022 07:24 AM     No results found for: COLOR, APPRN, SPGRU, REFSG, JOSE G, PROTU, GLUCU, KETU, BILU, UROU, STEPHANIE, LEUKU, GLUKE, EPSU, BACTU, WBCU, RBCU, CASTS, UCRY      Medications Reviewed:     Current Facility-Administered Medications   Medication Dose Route Frequency    midodrine (PROAMATINE) tablet 5 mg  5 mg Oral TID WITH MEALS    bumetanide (BUMEX) tablet 1 mg  1 mg Oral BID    carvediloL (COREG) tablet 6.25 mg  6.25 mg Oral BID WITH MEALS    artificial saliva (MOUTH KOTE) 1 Spray  1 Spray Oral PRN    sodium chloride (NS) flush 5-40 mL  5-40 mL IntraVENous Q8H    sodium chloride (NS) flush 5-40 mL  5-40 mL IntraVENous PRN    acetaminophen (TYLENOL) tablet 650 mg  650 mg Oral Q6H PRN    Or    acetaminophen (TYLENOL) suppository 650 mg  650 mg Rectal Q6H PRN    ondansetron (ZOFRAN ODT) tablet 4 mg  4 mg Oral Q8H PRN    Or    ondansetron (ZOFRAN) injection 4 mg  4 mg IntraVENous Q6H PRN    enoxaparin (LOVENOX) injection 30 mg  30 mg SubCUTAneous DAILY    albuterol-ipratropium (DUO-NEB) 2.5 MG-0.5 MG/3 ML  3 mL Nebulization Q4H PRN    aspirin chewable tablet 81 mg  81 mg Oral DAILY    [Held by provider] isosorbide mononitrate ER (IMDUR) tablet 30 mg  30 mg Oral 7am    mirtazapine (REMERON) tablet 7.5 mg  7.5 mg Oral QHS    hydroxypropyl methylcellulose (ISOPTO TEARS) 0.5 % ophthalmic solution 1 Drop  1 Drop Both Eyes PRN ______________________________________________________________________  EXPECTED LENGTH OF STAY: 3d 19h  ACTUAL LENGTH OF STAY:          7                 Devaughn Gr MD

## 2022-05-24 NOTE — DISCHARGE INSTRUCTIONS
Discharge SNF/Rehab Instructions/LTAC       PATIENT ID: Raciel Coronado  MRN: [id number]   YOB: 1931    DATE OF ADMISSION: 5/17/2022  1:38 PM    DATE OF DISCHARGE: 5/24/2022    PRIMARY CARE PROVIDER: Colton Arnold MD       ATTENDING PHYSICIAN: Inderjit Gorman MD  DISCHARGING PROVIDER: Les Bellamy MD     To contact this individual call [phone] and ask the  to page. If unavailable ask to be transferred the Adult Hospitalist Department. CONSULTATIONS: IP CONSULT TO CARDIOLOGY  IP CONSULT TO PALLIATIVE CARE - PROVIDER  IP CONSULT TO PULMONOLOGY    PROCEDURES/SURGERIES: * No surgery found *    ADMITTING [address] [place] [address] COURSE:        Orpha Risk a 80 y. o. male with a pmhx hypertension, CKD, DM 2, dysphagia, falls, and dementia who presented via EMS for hypoxia, and tachycardia.  Per chart record, heart rate was in the 150s, and he was hypoxic to 83%.  Obtained from chart review due to patient with tachypnea, and inability to complete full sentences during my evaluation. Almita Hernandez was previously hospitalized from 3/14/2022 to 3/18/2022 for LAMINE with hyperkalemia     In the ED, he was tachycardic to the 130s, with recorded SPO2 97 to 100% on BiPAP.  Labs are significant for troponin 109, probnp 31,490,  PH 7.29, PCO2 58.5, and PO2 69.  Echocardiogram was done, and showed depressed EF 30 to 50%.   Chest x-ray significant for moderate pulmonary edema with bilateral pleural effusions.  EKG shows atrial fibrillation with  with non-specific ST-T wave changes.     In the ED, he received duo nebs, 2 mg IV Bumex, 125 IV Solu-Medrol, and was placed on BiPAP.  Between the time of my chart review, and evaluating the patient he had taken his BiPAP off due to not tolerating the mask.  Nursing staff tried a nasal mask without success.  Stat ABG ordered, and PCO2 was worsening so had a discussion with the patient, provided reassurance, treated anxiety with Seroquel, and reattempted Pap therapy. Chato Chan did tolerate at this time, and ABG is improving, so will admit to [facility] at this time.     Seen by Intensivist and recommended non ICU bed    Acute hypoxic, hypercapnic respiratory failure due to pulmonary edema, pleural effusions due to acute systolic CHF EF 65-86% NYHA Class IV on admission  -improved, off BiPAP   -on 2 l/m, SpO2  % monitor pulse ox to wean off oxygen   -s/p steroids   -BP was low, improving, added midodrine 5 mg tid, continue bumex, and coreg, imdur on hold  -strict I&O, and daily weight  -chest x ray on 5/17 moderate pulmonary edema with bilateral pleural effusions  -echo LV EF 15-20%, there are regional wall motion abnormalities, moderate AS  -follow up with cardiologist recommendation  -seen by pulmonologist  -palliative care team consulted  Elevated troponin   -troponin 141, initial was 109   -continue on aspirin, coreg and imdur  -no left side chest pain  -cardiologist on board   Atrial fibrillation, rate controlled  -rate controlled, off cardizem gtt    -discussion regarding risk benefit of anticoagulation due to hx frequent falls, and dementia  -continue coreg and aspirin  -CT head no acute process  AAA   -US of abdomen on 3/14 Aneurysmal dilatation of the abdominal aorta is suggested measuring up to 5.6 cm in size.   -no abdominal pain  -patient with dementia, high risk for intervention  CKD stage IIIa  -creatinine stable  -avoid nephrotoxin   -monitor renal function   Chornic dementia  -Patient with chronic dementia,   -conscious and alert, oriented to place, person and time, answer questions  -continue neuro check and supportive care  Anemia of chronic disease   -Hgb 11.1  -stable        Code status: DNR  Prophylaxis: Lovenox      DISCHARGE DIAGNOSES / PLAN:      Acute hypoxic, hypercapnic respiratory failure due to pulmonary edema, pleural effusions due to acute systolic CHF EF 57-88%  -continue oxygen via nasal canula at 2 l/m, monitor pulse ox to wean off oxygen   -s/p steroids   Acute systolic CHF EF 97-34, NYHA Class III, on discharge  -BP was low, improving,  -continue bumex 1 mg bid, coreg, and midodrine 5 mg tid,   -imdur on hold  -fluid restriction 1200 ml/24 hrs  -outpatient follow up with Cardiologist  Elevated troponin possible due to CHF  -continue on aspirin, coreg and imdur  Atrial fibrillation, rate controlled  -continue coreg and aspirin  AAA   -US of abdomen on 3/14 Aneurysmal dilatation of the abdominal aorta is suggested measuring up to 5.6 cm in size.   -no abdominal pain  -patient with dementia, high risk for intervention  CKD stage IIIa  -creatinine stable  -avoid nephrotoxin   -monitor renal function   Chornic dementia  -Patient with chronic dementia,   -conscious and alert, oriented to place, person and time, answer questions  -continue neuro check and supportive care  Anemia of chronic disease   -stable       PENDING TEST RESULTS:   At the time of discharge the following test results are still pending: None     FOLLOW UP APPOINTMENTS:    Follow-up Information     Follow up With Specialties Details Why Contact Info   1. Sangeeta Tong MD Internal Medicine Physician In one week   [address] [place] [address]  [phone]        2. Sai Perez MD, Cardiologist in 1-2 weeks     ADDITIONAL CARE RECOMMENDATIONS:      DIET: Easy to chew, fluid restriction 1200 ml/hr      TUBE FEEDING INSTRUCTIONS: None    OXYGEN / BiPAP SETTINGS: None    ACTIVITY: Activity as tolerated    WOUND CARE: None    EQUIPMENT needed: None      DISCHARGE MEDICATIONS:   See Medication Reconciliation Form      NOTIFY YOUR PHYSICIAN FOR ANY OF THE FOLLOWING:   Fever over 101 degrees for 24 hours. Chest pain, shortness of breath, fever, chills, nausea, vomiting, diarrhea, change in mentation, falling, weakness, bleeding. Severe pain or pain not relieved by medications.   Or, any other signs or symptoms that you may have questions about.    DISPOSITION:    Home With:   OT  PT  HH  RN      x SNF/Inpatient Rehab/LTAC    Independent/assisted living    Hospice    Other:       PATIENT CONDITION AT DISCHARGE:     Functional status    Poor    x Deconditioned     Independent      Cognition     x Lucid     Forgetful     Dementia      Catheters/lines (plus indication)    Pham     PICC     PEG    x None      Code status     Full code    x DNR      PHYSICAL EXAMINATION AT DISCHARGE:   Refer to Progress Note       CHRONIC MEDICAL DIAGNOSES:  Problem List as of 5/24/2022 Never Reviewed          Codes Class Noted - Resolved    Acute systolic heart failure ([facility] [address] ICD-10-CM: I50.21  ICD-9-CM: 428.21  5/23/2022 - Present        Acute respiratory failure with hypoxia and hypercarbia (HCC) ICD-10-CM: J96.01, J96.02  ICD-9-CM: 518.81  5/18/2022 - Present        SOB (shortness of breath) ICD-10-CM: R06.02  ICD-9-CM: 786.05  5/17/2022 - Present        Hyperkalemia ICD-10-CM: E87.5  ICD-9-CM: 276.7  3/14/2022 - Present        Blurred vision ICD-10-CM: H53.8  ICD-9-CM: 368.8  1/4/2022 - Present        Confusion ICD-10-CM: R41.0  ICD-9-CM: 298.9  1/4/2022 - Present                CDMP Checked:   Yes x     PROBLEM LIST Updated:  Yes x         Signed:   Idania Wang MD  5/24/2022  9:27 PM

## 2022-05-24 NOTE — DISCHARGE SUMMARY
Discharge Summary       PATIENT ID: Temi Becker  MRN: [id number]   YOB: 1931    DATE OF ADMISSION: 5/17/2022  1:38 PM    DATE OF DISCHARGE: 5/24/2022   PRIMARY CARE PROVIDER: Aziza Funk MD     ATTENDING PHYSICIAN: Bridget Drake MD   DISCHARGING PROVIDER: Ravi Pires MD    To contact this individual call [phone] and ask the  to page. If unavailable ask to be transferred the Adult Hospitalist Department. CONSULTATIONS: IP CONSULT TO CARDIOLOGY  IP CONSULT TO PALLIATIVE CARE - PROVIDER  IP CONSULT TO PULMONOLOGY    PROCEDURES/SURGERIES: * No surgery found *      ADMISSION SUMMARY AND HOSPITAL COURSE:     Manpreet Bae a 80 y. o. male with a pmhx hypertension, CKD, DM 2, dysphagia, falls, and dementia who presented via EMS for hypoxia, and tachycardia.  Per chart record, heart rate was in the 150s, and he was hypoxic to 83%.  Obtained from chart review due to patient with tachypnea, and inability to complete full sentences during my evaluation. [facility] was previously hospitalized from 3/14/2022 to 3/18/2022 for LAMINE with hyperkalemia     In the ED, he was tachycardic to the 130s, with recorded SPO2 97 to 100% on BiPAP.  Labs are significant for troponin 109, probnp 31,490,  PH 7.29, PCO2 58.5, and PO2 69.  Echocardiogram was done, and showed depressed EF 30 to 50%.   Chest x-ray significant for moderate pulmonary edema with bilateral pleural effusions.  EKG shows atrial fibrillation with  with non-specific ST-T wave changes.     In the ED, he received duo nebs, 2 mg IV Bumex, 125 IV Solu-Medrol, and was placed on BiPAP.  Between the time of my chart review, and evaluating the patient he had taken his BiPAP off due to not tolerating the mask.  Nursing staff tried a nasal mask without success.  Stat ABG ordered, and PCO2 was worsening so had a discussion with the patient, provided reassurance, treated anxiety with Seroquel, and reattempted Pap therapy. [facility] did tolerate at this time, and ABG is improving, so will admit to [facility] at this time.     Seen by Intensivist and recommended non ICU bed    Acute hypoxic, hypercapnic respiratory failure due to pulmonary edema, pleural effusions due to acute systolic CHF EF 51-58% NYHA Class IV on admission  -improved, off BiPAP   -on 2 l/m, SpO2  % monitor pulse ox to wean off oxygen   -s/p steroids   -BP was low, improving, added midodrine 5 mg tid, continue bumex, and coreg, imdur on hold  -strict I&O, and daily weight  -chest x ray on 5/17 moderate pulmonary edema with bilateral pleural effusions  -echo LV EF 15-20%, there are regional wall motion abnormalities, moderate AS  -follow up with cardiologist recommendation  -seen by pulmonologist  -palliative care team consulted  Elevated troponin   -troponin 141, initial was 109   -continue on aspirin, coreg and imdur  -no left side chest pain  -cardiologist on board   Atrial fibrillation, rate controlled  -rate controlled, off cardizem gtt    -discussion regarding risk benefit of anticoagulation due to hx frequent falls, and dementia  -continue coreg and aspirin  -CT head no acute process  AAA   -US of abdomen on 3/14 Aneurysmal dilatation of the abdominal aorta is suggested measuring up to 5.6 cm in size.   -no abdominal pain  -patient with dementia, high risk for intervention  CKD stage IIIa  -creatinine stable  -avoid nephrotoxin   -monitor renal function   Chornic dementia  -Patient with chronic dementia,   -conscious and alert, oriented to place, person and time, answer questions  -continue neuro check and supportive care  Anemia of chronic disease   -Hgb 11.1  -stable        Code status: DNR  Prophylaxis: Lovenox      DISCHARGE DIAGNOSES / PLAN:      Acute hypoxic, hypercapnic respiratory failure due to pulmonary edema, pleural effusions due to acute systolic CHF EF 85-94%  -continue oxygen via nasal canula at 2 l/m, monitor pulse ox to wean off oxygen   -s/p steroids Acute systolic CHF EF 79-84, NYHA Class III, on discharge  -BP was low, improving,  -continue bumex 1 mg bid, coreg, and midodrine 5 mg tid,   -imdur on hold  -fluid restriction 1200 ml/24 hrs  -outpatient follow up with Cardiologist  Elevated troponin possible due to CHF  -continue on aspirin, coreg and imdur  Atrial fibrillation, rate controlled  -continue coreg and aspirin  AAA   -US of abdomen on 3/14 Aneurysmal dilatation of the abdominal aorta is suggested measuring up to 5.6 cm in size.   -no abdominal pain  -patient with dementia, high risk for intervention  CKD stage IIIa  -creatinine stable  -avoid nephrotoxin   -monitor renal function   Chornic dementia  -Patient with chronic dementia,   -conscious and alert, oriented to place, person and time, answer questions  -continue neuro check and supportive care  Anemia of chronic disease   -stable       PENDING TEST RESULTS:   At the time of discharge the following test results are still pending: None     FOLLOW UP APPOINTMENTS:    Follow-up Information     Follow up With Specialties Details Why Contact Info   1. Mary Magana MD Internal Medicine Physician In one week   [address] [place] [address]  [phone]        2. Kellie Barfield MD, Cardiologist in 1-2 weeks     DIET: Regular Diet    ACTIVITY: Activity as tolerated    EQUIPMENT needed: None    DISCHARGE MEDICATIONS:  Current Discharge Medication List      START taking these medications    Details   artificial saliva (MOUTH KOTE) spra Take 1 Spray by mouth as needed for PRN Reason (Other). Qty: 30 mL, Refills: 0  Start date: 5/24/2022      midodrine (PROAMATINE) 5 mg tablet Take 1 Tablet by mouth three (3) times daily (with meals) for 30 days. Qty: 90 Tablet, Refills: 0  Start date: 5/24/2022, End date: 6/23/2022      carvediloL (COREG) 6.25 mg tablet Take 1 Tablet by mouth two (2) times daily (with meals).   Qty: 60 Tablet, Refills: 0  Start date: 5/24/2022 bumetanide (BUMEX) 1 mg tablet Take 1 Tablet by mouth two (2) times a day. Qty: 60 Tablet, Refills: 0  Start date: 5/24/2022         CONTINUE these medications which have NOT CHANGED    Details   dulaglutide (Trulicity) 3 TJ/4.5 mL pnij 0.5 mL by SubCUTAneous route every Monday. albuterol (Ventolin HFA) 90 mcg/actuation inhaler Take 2 Puffs by inhalation every four (4) hours as needed for Wheezing. acetaminophen (TYLENOL) 325 mg tablet Take 650 mg by mouth every four (4) hours as needed for Pain or Fever. polyvinyl alcohol (LIQUIFILM TEARS) 1.4 % ophthalmic solution Administer 1 Drop to both eyes as needed. aspirin 81 mg chewable tablet Take 81 mg by mouth daily. calcium carbonate (OS-LUL) 500 mg calcium (1,250 mg) tablet Take  by mouth daily. cholecalciferol (VITAMIN D3) (1000 Units /25 mcg) tablet Take 4,000 Units by mouth daily. ezetimibe (ZETIA) 10 mg tablet Take 0.5 mg by mouth daily. ferrous sulfate 325 mg (65 mg iron) tablet Take  by mouth two (2) times a day. fluticasone propionate (FLONASE) 50 mcg/actuation nasal spray 2 Sprays by Both Nostrils route daily. loratadine 10 mg cap Take 10 mg by mouth daily. mirtazapine (REMERON) 7.5 mg tablet Take 7.5 mg by mouth nightly. omeprazole (PRILOSEC) 20 mg capsule Take 20 mg by mouth daily.          STOP taking these medications       furosemide (LASIX) 40 mg tablet Comments:   Reason for Stopping:         isosorbide mononitrate ER (IMDUR) 30 mg tablet Comments:   Reason for Stopping:               DISPOSITION:    Home With:   OT  PT  HH  RN      x Long term SNF/Inpatient Rehab    Independent/assisted living    Hospice    Other:       PATIENT CONDITION AT DISCHARGE:     Functional status    Poor    x Deconditioned     Independent      Cognition     Lucid    x Forgetful     Dementia      Catheters/lines (plus indication)    Pham     PICC     PEG    x None      Code status   x  Full code     DNR PHYSICAL EXAMINATION AT DISCHARGE:  Patient Vitals for the past 24 hrs:   Temp Pulse Resp BP SpO2   05/24/22 1744 98.1 °F (36.7 °C) 61 18 103/69 94 %   05/24/22 1620   17     05/24/22 1448 98.6 °F (37 °C) (!) 51 18 (!) 94/55 99 %   05/24/22 0902 97.7 °F (36.5 °C) 65 18 105/65 100 %   05/24/22 0418 97.8 °F (36.6 °C) (!) 57 18 111/75 98 %     General:          Alert, cooperative, no distress, appears stated age. HEENT:           Atraumatic, anicteric sclerae, pink conjunctivae                          No oral ulcers, mucosa moist, throat clear, dentition fair  Neck:               Supple, symmetrical  Lungs:             Clear to auscultation bilaterally. No Wheezing or Rhonchi. No rales. Chest wall:      No tenderness  No Accessory muscle use. Heart:              Regular  rhythm,  No  murmur   No edema  Abdomen:        Soft, non-tender. Not distended. Bowel sounds normal  Extremities:     No cyanosis. No clubbing,                            Skin turgor normal, Capillary refill normal  Skin:                Not pale. Not Jaundiced  No rashes   Psych:             Not anxious or agitated.   Neurologic:      Alert, moves all extremities, answers questions appropriately and responds to commands       Recent Results (from the past 24 hour(s))   NT-PRO BNP    Collection Time: 05/24/22  3:27 AM   Result Value Ref Range    NT pro-BNP 22,294 (H) <450 PG/ML   PROCALCITONIN    Collection Time: 05/24/22  3:27 AM   Result Value Ref Range    Procalcitonin <4.85 ng/mL   METABOLIC PANEL, COMPREHENSIVE    Collection Time: 05/24/22  3:27 AM   Result Value Ref Range    Sodium 142 136 - 145 mmol/L    Potassium 4.0 3.5 - 5.1 mmol/L    Chloride 102 97 - 108 mmol/L    CO2 37 (H) 21 - 32 mmol/L    Anion gap 3 (L) 5 - 15 mmol/L    Glucose 141 (H) 65 - 100 mg/dL    BUN 28 (H) 6 - 20 MG/DL    Creatinine 1.27 0.70 - 1.30 MG/DL    BUN/Creatinine ratio 22 (H) 12 - 20      GFR est AA >60 >60 ml/min/1.73m2    GFR est non-AA 53 (L) >60 ml/min/1.73m2    Calcium 8.7 8.5 - 10.1 MG/DL    Bilirubin, total 0.5 0.2 - 1.0 MG/DL    ALT (SGPT) 16 12 - 78 U/L    AST (SGOT) 11 (L) 15 - 37 U/L    Alk.  phosphatase 56 45 - 117 U/L    Protein, total 5.9 (L) 6.4 - 8.2 g/dL    Albumin 2.5 (L) 3.5 - 5.0 g/dL    Globulin 3.4 2.0 - 4.0 g/dL    A-G Ratio 0.7 (L) 1.1 - 2.2     PHOSPHORUS    Collection Time: 05/24/22  3:27 AM   Result Value Ref Range    Phosphorus 2.4 (L) 2.6 - 4.7 MG/DL   CBC W/O DIFF    Collection Time: 05/24/22  6:36 AM   Result Value Ref Range    WBC 5.7 4.1 - 11.1 K/uL    RBC 3.56 (L) 4.10 - 5.70 M/uL    HGB 11.1 (L) 12.1 - 17.0 g/dL    HCT 37.8 36.6 - 50.3 %    .2 (H) 80.0 - 99.0 FL    MCH 31.2 26.0 - 34.0 PG    MCHC 29.4 (L) 30.0 - 36.5 g/dL    RDW 15.1 (H) 11.5 - 14.5 %    PLATELET 636 726 - 160 K/uL    MPV 11.4 8.9 - 12.9 FL    NRBC 0.0 0  WBC    ABSOLUTE NRBC 0.00 0.00 - 0.01 K/uL   COVID-19 RAPID TEST    Collection Time: 05/24/22  3:42 PM   Result Value Ref Range    Specimen source Nasopharyngeal      COVID-19 rapid test Not detected NOTD       CHRONIC MEDICAL DIAGNOSES:  Problem List as of 5/24/2022 Never Reviewed          Codes Class Noted - Resolved    Acute systolic heart failure (HCC) ICD-10-CM: I50.21  ICD-9-CM: 428.21  5/23/2022 - Present        Acute respiratory failure with hypoxia and hypercarbia (HCC) ICD-10-CM: J96.01, J96.02  ICD-9-CM: 518.81  5/18/2022 - Present        SOB (shortness of breath) ICD-10-CM: R06.02  ICD-9-CM: 786.05  5/17/2022 - Present        Hyperkalemia ICD-10-CM: E87.5  ICD-9-CM: 276.7  3/14/2022 - Present        Blurred vision ICD-10-CM: H53.8  ICD-9-CM: 368.8  1/4/2022 - Present        Confusion ICD-10-CM: R41.0  ICD-9-CM: 298.9  1/4/2022 - Present              Greater than 45 minutes were spent with the patient on counseling and coordination of care    Signed:   Zadi Kerns MD  5/24/2022  1:45 PM

## 2022-05-25 ENCOUNTER — TELEPHONE (OUTPATIENT)
Dept: CARDIOLOGY CLINIC | Age: 87
End: 2022-05-25

## 2022-05-25 NOTE — TELEPHONE ENCOUNTER
Hospital dc to [facility]  Has CHF systolic  Home on bumex, coreg and midodrine  Low EF but compensated  See Milvia or Donna Pac in next 2-3 weeks

## 2024-01-26 NOTE — DISCHARGE SUMMARY
Discharge Summary     Patient:  Maria Alejandra Engle       MRN: [id number]       YOB: 1931       Age: 80 y.o. Date of admission:  3/14/2022    Date of discharge:  3/18/2022    Primary care provider: Dr. Janet Fulton MD    Admitting provider:  Lavelle De La Torre MD    Discharging provider:  Eneida Ahmadi MD - [phone]  If unavailable, call [phone] and ask the  to page the triage hospitalist.    Consultations  · IP CONSULT TO PALLIATIVE CARE - PROVIDER    Procedures  · * No surgery found *    Discharge destination: SNF. The patient is stable for discharge. Admission diagnosis  · Hyperkalemia [E87.5]    Current Discharge Medication List      CONTINUE these medications which have NOT CHANGED    Details   polyvinyl alcohol (LIQUIFILM TEARS) 1.4 % ophthalmic solution Administer 1 Drop to both eyes as needed. ascorbic acid, vitamin C, (VITAMIN C) 500 mg tablet Take 500 mg by mouth daily. aspirin 81 mg chewable tablet Take 81 mg by mouth daily. calcium carbonate (OS-LUL) 500 mg calcium (1,250 mg) tablet Take  by mouth daily. cholecalciferol (VITAMIN D3) (1000 Units /25 mcg) tablet Take 4,000 Units by mouth daily. ezetimibe (ZETIA) 10 mg tablet Take 0.5 mg by mouth. ferrous sulfate 325 mg (65 mg iron) tablet Take  by mouth two (2) times a day. fluticasone propionate (FLONASE) 50 mcg/actuation nasal spray 2 Sprays by Both Nostrils route daily. loratadine 10 mg cap Take 10 mg by mouth daily. mirtazapine (REMERON) 7.5 mg tablet Take 7.5 mg by mouth nightly. omeprazole (PRILOSEC) 20 mg capsule Take 20 mg by mouth daily. ondansetron (ZOFRAN ODT) 4 mg disintegrating tablet Take 4 mg by mouth every six (6) hours as needed for Nausea or Vomiting.          STOP taking these medications       amLODIPine (Norvasc) 10 mg tablet Comments:   Reason for Stopping:         furosemide (Lasix) 40 mg tablet Comments:   Reason for Stopping:         gabapentin (NEURONTIN) 300 mg capsule Comments:   Reason for Stopping:         isosorbide mononitrate ER (IMDUR) 60 mg CR tablet Comments:   Reason for Stopping:         lisinopriL (PRINIVIL, ZESTRIL) 10 mg tablet Comments:   Reason for Stopping:         oxyCODONE-acetaminophen (PERCOCET 7.5) 7.5-325 mg per tablet Comments:   Reason for Stopping:         tiZANidine (ZANAFLEX) 4 mg capsule Comments:   Reason for Stopping: Follow-up Information     Follow up With Specialties Details Why Contact Info    Marlena Ortiz MD Internal Medicine In 1 week Discharge follow up  [address] [place] [address] 83. [phone]            Final discharge diagnoses and brief hospital course  Please also refer to the admission H&P for details on the presenting problem. 80 y. o. male with a pmhx dementia, HTN, DM II, dyslipidemia, dysphagia, frequent falls, and FTT who is being admitted for LAMINE with hyperkalemia.  When asking patient about ROS, he only states that he is cold and would like more blankets, and that he doesn't know why he is in the hospital.        LAMINE - stage 2 Cr 3.0 on admit, improved   -patient states that he has decreased PO intake, minimal intake still here. Says he does not like the food   -improved with IVF    -renal ultrasound with normal kidneys, no hydro  -trend renal function  -Avoid renal toxins, and hypotension.  Renally dose meds. -d/c Lasix/Lisinopril      Possible aortic aneurysm - incidental, asymptomatic   - Seen on renal US  - outpatient work up, attempted CTA but unable get IV access. Since this is non-emergent test as patient's condition is stable, would defer to have outpatient work up at this time.    - Monitor for symptoms, abdominal pain   - BP Management     HTN -BP has been stable off all BP meds, patient is risk for significant hypotension based on home med list. Will hold for now. HLD- c/w statin      Dementia  -consult case management for dispo plans [facility]     Pseudohyperkalemia - hemolyzed sample     FOLLOW-UP CARE RECOMMENDATIONS:    FOLLOW-UP TESTS RECOMMENDED:   - repeat Renal function in 2 weeks     ONGOING TREATMENT PLAN: as above     PENDING TEST RESULTS:  At the time of discharge the following test results are still pending: none. Please review these results as they become available. Specific symptoms to watch for: chest pain, shortness of breath, fever, chills, nausea, vomiting, diarrhea, change in mentation, falling, weakness, bleeding.     DIET:  Regular Diet    ACTIVITY:  Activity as tolerated    WOUND CARE: NONE    EQUIPMENT needed:  NONE    INCIDENTAL FINDINGS:  NONE    GOALS OF CARE:    Eventual return to home/independent/assisted living   X  Long term SNF      Hospice     No rehospitalization     Patient condition at discharge:   Functional status    Poor    X  Deconditioned      Independent   Cognition    Lucid     Forgetful (some sensescence)   X  Dementia   Catheters/lines (plus indication)    Pham     PICC      PEG         Code status  X  Full code      DNR      Physical examination at discharge  Visit Vitals  /72 (BP Patient Position: Sitting)   Pulse 78   Temp 98.8 °F (37.1 °C)   Resp 13   Ht 5' 9\" (1.753 m)   Wt 79.5 kg (175 lb 4.3 oz)   SpO2 97%   BMI 25.88 kg/m²     Alert and awake   Lung Clear  CVS: RRR   Abd: soft NT ND   Ext: no edema    Pertinent imaging studies:      Recent Labs     03/17/22  0513 03/17/22  0441 03/16/22  0250   WBC 7.7 7.1 4.5   HGB 10.3* 10.2* 10.0*   HCT 33.5* 33.5* 32.9*    211 224     Recent Labs     03/17/22  0513 03/17/22  0441 03/16/22  0250    138 138   K 5.2* 4.0 4.3    108 107   CO2 25 26 28   BUN 27* 28* 35*   CREA 1.53* 1.45* 1.99*   GLU 74 79 76   CA 9.4 9.3 9.3   MG  --  2.0 2.1   PHOS 2.6 2.2* 2.7     Recent Labs     03/17/22  0513 03/15/22  0008 03/14/22  1706   AP  --   --  56   TP  --   --  7.2   ALB 2.4* 2.6* 2.8*   GLOB  --   --  4.4*     No results for input(s): INR, PTP, APTT, INREXT in the last 72 hours. No results for input(s): FE, TIBC, PSAT, FERR in the last 72 hours. No results for input(s): PH, PCO2, PO2 in the last 72 hours. No results for input(s): CPK, CKMB in the last 72 hours. No lab exists for component: TROPONINI  No components found for: [place]    Chronic Diagnoses:    Problem List as of 3/17/2022 Never Reviewed          Codes Class Noted - Resolved    Hyperkalemia ICD-10-CM: E87.5  ICD-9-CM: 276.7  3/14/2022 - Present        Blurred vision ICD-10-CM: H53.8  ICD-9-CM: 368.8  1/4/2022 - Present        Confusion ICD-10-CM: R41.0  ICD-9-CM: 298.9  1/4/2022 - Present              Time spent on discharge related activities today greater than 30 minutes.       Signed:  Donato Lion MD                 Hospitalist, Internal Medicine      Cc: Myrtle Dias MD [Initial Evaluation] : an initial evaluation for [FreeTextEntry2] : dysphonia

## 2025-02-24 ENCOUNTER — TRANSCRIBE ORDERS (OUTPATIENT)
Facility: HOSPITAL | Age: 89
End: 2025-02-24

## 2025-02-24 DIAGNOSIS — I77.819 AORTIC ECTASIA: Primary | ICD-10-CM

## 2025-02-25 ENCOUNTER — HOSPITAL ENCOUNTER (OUTPATIENT)
Facility: HOSPITAL | Age: 89
Discharge: HOME | End: 2025-02-28

## 2025-02-25 DIAGNOSIS — I77.819 AORTIC ECTASIA: ICD-10-CM

## 2025-02-27 ENCOUNTER — HOSPITAL ENCOUNTER (OUTPATIENT)
Facility: HOSPITAL | Age: 89
Discharge: HOME | End: 2025-02-27
Payer: MEDICARE

## 2025-02-27 PROCEDURE — 74150 CT ABDOMEN W/O CONTRAST: CPT
